# Patient Record
Sex: MALE | Race: BLACK OR AFRICAN AMERICAN | Employment: UNEMPLOYED | URBAN - METROPOLITAN AREA
[De-identification: names, ages, dates, MRNs, and addresses within clinical notes are randomized per-mention and may not be internally consistent; named-entity substitution may affect disease eponyms.]

---

## 2017-01-12 ENCOUNTER — HOSPITAL ENCOUNTER (EMERGENCY)
Age: 57
Discharge: HOME | End: 2017-01-12
Attending: EMERGENCY MEDICINE
Payer: SUBSIDIZED

## 2017-01-12 VITALS
HEIGHT: 73 IN | TEMPERATURE: 97.9 F | SYSTOLIC BLOOD PRESSURE: 130 MMHG | OXYGEN SATURATION: 98 % | DIASTOLIC BLOOD PRESSURE: 68 MMHG | BODY MASS INDEX: 25.02 KG/M2 | WEIGHT: 188.8 LBS | HEART RATE: 76 BPM | RESPIRATION RATE: 18 BRPM

## 2017-01-12 DIAGNOSIS — J02.9 VIRAL PHARYNGITIS: Primary | ICD-10-CM

## 2017-01-12 LAB — DEPRECATED S PYO AG THROAT QL EIA: NEGATIVE

## 2017-01-12 PROCEDURE — 87070 CULTURE OTHR SPECIMN AEROBIC: CPT | Performed by: EMERGENCY MEDICINE

## 2017-01-12 PROCEDURE — 87880 STREP A ASSAY W/OPTIC: CPT | Performed by: PHYSICIAN ASSISTANT

## 2017-01-12 PROCEDURE — 99283 EMERGENCY DEPT VISIT LOW MDM: CPT

## 2017-01-12 PROCEDURE — 74011000250 HC RX REV CODE- 250: Performed by: PHYSICIAN ASSISTANT

## 2017-01-12 RX ORDER — LIDOCAINE HYDROCHLORIDE 20 MG/ML
15 SOLUTION OROPHARYNGEAL
Status: COMPLETED | OUTPATIENT
Start: 2017-01-12 | End: 2017-01-12

## 2017-01-12 RX ORDER — ACETAMINOPHEN AND CODEINE PHOSPHATE 120; 12 MG/5ML; MG/5ML
1 SOLUTION ORAL
Qty: 120 ML | Refills: 0 | Status: SHIPPED | OUTPATIENT
Start: 2017-01-12 | End: 2017-03-21

## 2017-01-12 RX ADMIN — LIDOCAINE HYDROCHLORIDE 15 ML: 20 SOLUTION ORAL; TOPICAL at 12:20

## 2017-01-12 NOTE — ED NOTES
Patient seen for complaints of sore throat that started yesterday. Denies fever, no cough, no other cold symptoms. Denies sick members of household. Redness noted to throat. Complains of difficulty swallowing pills this morning. Emergency Department Nursing Plan of Care       The Nursing Plan of Care is developed from the Nursing assessment and Emergency Department Attending provider initial evaluation. The plan of care may be reviewed in the ED Provider note.     The Plan of Care was developed with the following considerations:   Patient / Family readiness to learn indicated by:verbalized understanding  Persons(s) to be included in education: patient  Barriers to Learning/Limitations:No    Signed     Manus Jevon    1/12/2017  [id number]

## 2017-01-12 NOTE — DISCHARGE INSTRUCTIONS
Sore Throat: Care Instructions  Your Care Instructions    Infection by bacteria or a virus causes most sore throats. Cigarette smoke, dry air, air pollution, allergies, and yelling can also cause a sore throat. Sore throats can be painful and annoying. Fortunately, most sore throats go away on their own. If you have a bacterial infection, your doctor may prescribe antibiotics. Follow-up care is a key part of your treatment and safety. Be sure to make and go to all appointments, and call your doctor if you are having problems. It's also a good idea to know your test results and keep a list of the medicines you take. How can you care for yourself at home? · If your doctor prescribed antibiotics, take them as directed. Do not stop taking them just because you feel better. You need to take the full course of antibiotics. · Gargle with warm salt water once an hour to help reduce swelling and relieve discomfort. Use 1 teaspoon of salt mixed in 1 cup of warm water. · Take an over-the-counter pain medicine, such as acetaminophen (Tylenol), ibuprofen (Advil, Motrin), or naproxen (Aleve). Read and follow all instructions on the label. · Be careful when taking over-the-counter cold or flu medicines and Tylenol at the same time. Many of these medicines have acetaminophen, which is Tylenol. Read the labels to make sure that you are not taking more than the recommended dose. Too much acetaminophen (Tylenol) can be harmful. · Drink plenty of fluids. Fluids may help soothe an irritated throat. Hot fluids, such as tea or soup, may help decrease throat pain. · Use over-the-counter throat lozenges to soothe pain. Regular cough drops or hard candy may also help. These should not be given to young children because of the risk of choking. · Do not smoke or allow others to smoke around you. If you need help quitting, talk to your doctor about stop-smoking programs and medicines.  These can increase your chances of quitting for good. · Use a vaporizer or humidifier to add moisture to your bedroom. Follow the directions for cleaning the machine. When should you call for help? Call your doctor now or seek immediate medical care if:  · You have new or worse trouble swallowing. · Your sore throat gets much worse on one side. Watch closely for changes in your health, and be sure to contact your doctor if you do not get better as expected. Where can you learn more? Go to http://ajit-david.info/. Enter [phone] [id number] in the search box to learn more about \"Sore Throat: Care Instructions. \"  Current as of: July 29, 2016  Content Version: 11.1  © 5305-2004 [employer]. Care instructions adapted under license by [employer] (which disclaims liability or warranty for this information). If you have questions about a medical condition or this instruction, always ask your healthcare professional. [address] any warranty or liability for your use of this information.

## 2017-01-12 NOTE — ED PROVIDER NOTES
Patient is a 60 y.o. male presenting with sore throat. The history is provided by the patient. Sore Throat    This is a new problem. The current episode started yesterday. The problem has not changed since onset. There has been no fever. Pertinent negatives include no congestion, no drooling, no ear discharge, no ear pain, no headaches, no shortness of breath, no stridor, no swollen glands and no cough. He has had no exposure to strep. He has tried nothing for the symptoms. Past Medical History:   Diagnosis Date    Diabetes ([facility] [address]     Diabetes mellitus ([facility] [address] 7/11/2012    Eczema 7/11/2012       Past Surgical History:   Procedure Laterality Date    Hx orthopaedic       L knee scope         Family History:   Problem Relation Age of Onset    Hypertension Mother    [facility] Arthritis-osteo Mother        Social History     Social History    Marital status: SINGLE     Spouse name: N/A    Number of children: N/A    Years of education: N/A     Occupational History    Not on file. Social History Main Topics    Smoking status: Current Every Day Smoker     Packs/day: 1.00    Smokeless tobacco: Current User    Alcohol use 5.0 oz/week     5 Cans of beer, 5 Standard drinks or equivalent per week      Comment: daily    Drug use: Yes     Special: Cocaine, Heroin      Comment: reports using last about one week ago, prior to that had been clean for several years    Sexual activity: Yes     Other Topics Concern    Not on file     Social History Narrative         ALLERGIES: Doxycycline; Tramadol; and Pcn [penicillins]    Review of Systems   Constitutional: Negative for fever. HENT: Positive for sore throat. Negative for congestion, drooling, ear discharge and ear pain. Respiratory: Negative for cough, shortness of breath and stridor. Neurological: Negative for headaches. All other systems reviewed and are negative.       Vitals:    01/12/17 1116   BP: 130/68   Pulse: 76   Resp: 18   Temp: 97.9 °F (36.6 °C) SpO2: 98%   Weight: 85.6 kg (188 lb 12.8 oz)   Height: 6' 1\" (1.854 m)            Physical Exam   Constitutional: He is oriented to person, place, and time. He appears well-developed and well-nourished. No distress. HENT:   Head: Normocephalic and atraumatic. Right Ear: Tympanic membrane normal.   Left Ear: Tympanic membrane normal.   Mouth/Throat: Uvula is midline, oropharynx is clear and moist and mucous membranes are normal. He has dentures. No oropharyngeal exudate, posterior oropharyngeal edema, posterior oropharyngeal erythema or tonsillar abscesses. Eyes: Conjunctivae are normal.   Cardiovascular: Normal rate, regular rhythm and normal heart sounds. Pulmonary/Chest: Effort normal and breath sounds normal. No respiratory distress. He has no wheezes. He has no rales. Musculoskeletal: Normal range of motion. Lymphadenopathy:        Head (right side): Submandibular (and TTP) adenopathy present. Neurological: He is alert and oriented to person, place, and time. Skin: Skin is warm and dry. Psychiatric: He has a normal mood and affect. His behavior is normal. Judgment and thought content normal.   Nursing note and vitals reviewed.        MDM  Number of Diagnoses or Management Options  Diagnosis management comments: DDX: strep v viral pharyngitis, lymphadenitis, lymphadenopathy       Amount and/or Complexity of Data Reviewed  Clinical lab tests: ordered and reviewed      ED Course       Procedures

## 2017-01-14 LAB
BACTERIA SPEC CULT: NORMAL
SERVICE CMNT-IMP: NORMAL

## 2017-03-20 ENCOUNTER — HOSPITAL ENCOUNTER (EMERGENCY)
Age: 57
Discharge: HOME | End: 2017-03-20
Attending: EMERGENCY MEDICINE
Payer: SELF-PAY

## 2017-03-20 VITALS
OXYGEN SATURATION: 98 % | BODY MASS INDEX: 24.12 KG/M2 | HEART RATE: 68 BPM | HEIGHT: 73 IN | WEIGHT: 182 LBS | RESPIRATION RATE: 18 BRPM | DIASTOLIC BLOOD PRESSURE: 74 MMHG | TEMPERATURE: 97.6 F | SYSTOLIC BLOOD PRESSURE: 137 MMHG

## 2017-03-20 DIAGNOSIS — R73.9 HYPERGLYCEMIA: Primary | ICD-10-CM

## 2017-03-20 LAB
GLUCOSE BLD STRIP.AUTO-MCNC: 139 MG/DL (ref 65–100)
SERVICE CMNT-IMP: ABNORMAL

## 2017-03-20 PROCEDURE — 99283 EMERGENCY DEPT VISIT LOW MDM: CPT

## 2017-03-20 PROCEDURE — 82962 GLUCOSE BLOOD TEST: CPT

## 2017-03-20 RX ORDER — SODIUM CHLORIDE 0.9 % (FLUSH) 0.9 %
5-10 SYRINGE (ML) INJECTION EVERY 8 HOURS
Status: DISCONTINUED | OUTPATIENT
Start: 2017-03-20 | End: 2017-03-20

## 2017-03-20 RX ORDER — SODIUM CHLORIDE 0.9 % (FLUSH) 0.9 %
5-10 SYRINGE (ML) INJECTION AS NEEDED
Status: DISCONTINUED | OUTPATIENT
Start: 2017-03-20 | End: 2017-03-20

## 2017-03-20 NOTE — DISCHARGE INSTRUCTIONS
Learning About Diabetes Food Guidelines  Your Care Instructions  Meal planning is important to manage diabetes. It helps keep your blood sugar at a target level (which you set with your doctor). You don't have to eat special foods. You can eat what your family eats, including sweets once in a while. But you do have to pay attention to how often you eat and how much you eat of certain foods. You may want to work with a dietitian or a certified diabetes educator (CDE) to help you plan meals and snacks. A dietitian or CDE can also help you lose weight if that is one of your goals. What should you know about eating carbs? Managing the amount of carbohydrate (carbs) you eat is an important part of healthy meals when you have diabetes. Carbohydrate is found in many foods. · Learn which foods have carbs. And learn the amounts of carbs in different foods. ¨ Bread, cereal, pasta, and rice have about 15 grams of carbs in a serving. A serving is 1 slice of bread (1 ounce), ½ cup of cooked cereal, or 1/3 cup of cooked pasta or rice. ¨ Fruits have 15 grams of carbs in a serving. A serving is 1 small fresh fruit, such as an apple or orange; ½ of a banana; ½ cup of cooked or canned fruit; ½ cup of fruit juice; 1 cup of melon or raspberries; or 2 tablespoons of dried fruit. ¨ Milk and no-sugar-added yogurt have 15 grams of carbs in a serving. A serving is 1 cup of milk or 2/3 cup of no-sugar-added yogurt. ¨ Starchy vegetables have 15 grams of carbs in a serving. A serving is ½ cup of mashed potatoes or sweet potato; 1 cup winter squash; ½ of a small baked potato; ½ cup of cooked beans; or ½ cup cooked corn or green peas. · Learn how much carbs to eat each day and at each meal. A dietitian or CDE can teach you how to keep track of the amount of carbs you eat. This is called carbohydrate counting. · If you are not sure how to count carbohydrate grams, use the Plate Method to plan meals.  It is a good, quick way to make sure that you have a balanced meal. It also helps you spread carbs throughout the day. ¨ Divide your plate by types of foods. Put non-starchy vegetables on half the plate, meat or other protein food on one-quarter of the plate, and a grain or starchy vegetable in the final quarter of the plate. To this you can add a small piece of fruit and 1 cup of milk or yogurt, depending on how many carbs you are supposed to eat at a meal.  · Try to eat about the same amount of carbs at each meal. Do not \"save up\" your daily allowance of carbs to eat at one meal.  · Proteins have very little or no carbs per serving. Examples of proteins are beef, chicken, turkey, fish, eggs, tofu, cheese, cottage cheese, and peanut butter. A serving size of meat is 3 ounces, which is about the size of a deck of cards. Examples of meat substitute serving sizes (equal to 1 ounce of meat) are 1/4 cup of cottage cheese, 1 egg, 1 tablespoon of peanut butter, and ½ cup of tofu. How can you eat out and still eat healthy? · Learn to estimate the serving sizes of foods that have carbohydrate. If you measure food at home, it will be easier to estimate the amount in a serving of restaurant food. · If the meal you order has too much carbohydrate (such as potatoes, corn, or baked beans), ask to have a low-carbohydrate food instead. Ask for a salad or green vegetables. · If you use insulin, check your blood sugar before and after eating out to help you plan how much to eat in the future. · If you eat more carbohydrate at a meal than you had planned, take a walk or do other exercise. This will help lower your blood sugar. What else should you know? · Limit saturated fat, such as the fat from meat and dairy products. This is a healthy choice because people who have diabetes are at higher risk of heart disease. So choose lean cuts of meat and nonfat or low-fat dairy products. Use olive or canola oil instead of butter or shortening when cooking.   · Don't skip meals. Your blood sugar may drop too low if you skip meals and take insulin or certain medicines for diabetes. · Check with your doctor before you drink alcohol. Alcohol can cause your blood sugar to drop too low. Alcohol can also cause a bad reaction if you take certain diabetes medicines. Follow-up care is a key part of your treatment and safety. Be sure to make and go to all appointments, and call your doctor if you are having problems. It's also a good idea to know your test results and keep a list of the medicines you take. Where can you learn more? Go to http://ajit-david.info/. Enter J945 in the search box to learn more about \"Learning About Diabetes Food Guidelines. \"  Current as of: May 23, 2016  Content Version: 11.1  © 5982-8870 [employer]. Care instructions adapted under license by [employer] (which disclaims liability or warranty for this information). If you have questions about a medical condition or this instruction, always ask your healthcare professional. [address] any warranty or liability for your use of this information. Learning About High Blood Sugar  What is high blood sugar? Your body turns the food you eat into glucose (sugar), which it uses for energy. But if your body isn't able to use the sugar right away, it can build up in your blood and lead to high blood sugar. When the amount of sugar in your blood stays too high for too much of the time, you may have diabetes. Diabetes is a disease that can cause serious health problems. The good news is that lifestyle changes may help you get your blood sugar back to normal and avoid or delay diabetes. What causes high blood sugar? Sugar (glucose) can build up in your blood if you:  · Are overweight. · Have a family history of diabetes. · Take certain medicines, such as steroids. What are the symptoms?   Having high blood sugar may not cause any symptoms at all. Or it may make you feel very thirsty or very hungry. You may also urinate more often than usual, have blurry vision, or lose weight without trying. How is high blood sugar treated? You can take steps to lower your blood sugar level if you understand what makes it get higher. Your doctor may want you to learn how to test your blood sugar level at home. Then you can see how illness, stress, or different kinds of food or medicine raise or lower your blood sugar level. Other tests may be needed to see if you have diabetes. How can you prevent high blood sugar? · Watch your weight. If you're overweight, losing just a small amount of weight may help. Reducing fat around your waist is most important. · Limit the amount of calories, sweets, and unhealthy fat you eat. Ask your doctor if a dietitian can help you. A registered dietitian can help you create meal plans that fit your lifestyle. · Get at least 30 minutes of exercise on most days of the week. Exercise helps control your blood sugar. It also helps you maintain a healthy weight. Walking is a good choice. You also may want to do other activities, such as running, swimming, cycling, or playing tennis or team sports. · If your doctor prescribed medicines, take them exactly as prescribed. Call your doctor if you think you are having a problem with your medicine. You will get more details on the specific medicines your doctor prescribes. Follow-up care is a key part of your treatment and safety. Be sure to make and go to all appointments, and call your doctor if you are having problems. It's also a good idea to know your test results and keep a list of the medicines you take. Where can you learn more? Go to http://ajit-david.info/. Enter O108 in the search box to learn more about \"Learning About High Blood Sugar. \"  Current as of: May 23, 2016  Content Version: 11.1  © 2681-0172 [employer].  Care instructions adapted under license by [employer] (which disclaims liability or warranty for this information). If you have questions about a medical condition or this instruction, always ask your healthcare professional. [address] any warranty or liability for your use of this information. Noninsulin Medicines for Type 2 Diabetes: Care Instructions  Your Care Instructions  There are different types of noninsulin medicines for diabetes. Each works in a different way to help you control your blood sugar. Some types help your body make insulin to lower your blood sugar. Others lower how much insulin your body needs. Some can slow how quickly your body digests sugars. And some can remove extra glucose through your urine. Some of these medicines are:  · Alpha-glucosidase inhibitors. These keep starches from breaking down. This means that they lower the amount of glucose absorbed when you eat. They do not help your body make more insulin. So they will not cause low blood sugar unless they are used with other medicines for diabetes. They include acarbose and miglitol. · DPP-4 inhibitors. These help your body increase the level of insulin after eating. They also help your body make less of a hormone that raises blood sugar. They include linagliptin, saxagliptin, and sitagliptin. · Incretin hormones (GLP-1 receptor agonists). Your body makes a protein that can raise your insulin level, lower your blood sugar, and make you less hungry. You can inject hormone medicines that work the same way as this protein. They include exenatide and liraglutide. · Meglitinides. These help your body release insulin. They also help slow how your body digests sugars. In this way, they prevent your blood sugar from rising too fast after you eat. They include nateglinide and repaglinide. · Metformin. This lowers how much glucose your liver makes. And it helps you respond better to insulin.  It also lowers the amount of stored sugar that your liver releases when you are not eating. · Sodium glucose co-transporter 2 inhibitors (SGLT2 inhibitors). These help to remove extra glucose through your urine. They may also help some people lose weight. They include canagliflozin, dapagliflozin, and empagliflozin. · Sulfonylureas. These help your body release more insulin. Some work for many hours and can cause low blood sugar if you don't eat as you expected. They include glipizide and glyburide. · Thiazolidinediones. These reduce the amount of blood glucose. They also help you respond better to insulin. They include pioglitazone and rosiglitazone. You may need to take more than one medicine for diabetes. Two or more medicines may work better to lower your blood sugar level than just one medicine. Follow-up care is a key part of your treatment and safety. Be sure to make and go to all appointments, and call your doctor if you are having problems. It's also a good idea to know your test results and keep a list of the medicines you take. How can you care for yourself at home? · Eat a healthy diet. Get some exercise each day. This may help you to reduce how much medicine you need. · Do not take other prescription or over-the-counter medicines, vitamins, herbal products, or supplements without talking to your doctor first. Some medicines for type 2 diabetes can cause problems with other medicines or supplements. · Tell your doctor if you plan to get pregnant. Some of these drugs are not safe for pregnant women. · Be safe with medicines. Take your medicines exactly as prescribed. Meglitinides or sulfonylureas can cause your blood sugar to drop very low. Call your doctor if you think you are having a problem with your medicine. · Check your blood sugar levels often. You can use a glucose monitor. Keeping track can help you know how certain foods, activities, and medicines affect your blood sugar.  And it can help you keep your blood sugar from getting so low that it's not safe. When should you call for help? Call 911 anytime you think you may need emergency care. For example, call if:  · You passed out (lost consciousness), or you suddenly become very sleepy or confused. (You may have very low blood sugar.)  · You have symptoms of high blood sugar, such as:  ¨ Blurred vision. ¨ Trouble staying awake or being woken up. ¨ Fast, deep breathing. ¨ Breath that smells fruity. ¨ Belly pain, not feeling hungry, and vomiting. ¨ Feeling confused. Call your doctor now or seek immediate medical care if:  · You are sick and cannot control your blood sugar. · You have been vomiting or have had diarrhea for more than 6 hours. · Your blood sugar stays higher than the level your doctor has set for you. · You have symptoms of low blood sugar, such as:  ¨ Sweating. ¨ Feeling nervous, shaky, and weak. ¨ Extreme hunger and slight nausea. ¨ Dizziness and headache. ¨ Blurred vision. ¨ Confusion. Watch closely for changes in your health, and be sure to contact your doctor if:  · You have a hard time knowing when your blood sugar is low. · You have trouble keeping your blood sugar in the target range. · You often have problems controlling your blood sugar. · You have symptoms of long-term diabetes problems, such as:  ¨ New vision changes. ¨ New pain, numbness, or tingling in your hands or feet. ¨ Skin problems. Where can you learn more? Go to http://ajit-david.info/. Enter H153 in the search box to learn more about \"Noninsulin Medicines for Type 2 Diabetes: Care Instructions. \"  Current as of: August 3, 2016  Content Version: 11.1  © 9346-6093 [employer]. Care instructions adapted under license by [employer] (which disclaims liability or warranty for this information).  If you have questions about a medical condition or this instruction, always ask your healthcare professional. [employer] disclaims any warranty or liability for your use of this information.

## 2017-03-20 NOTE — ED PROVIDER NOTES
HPI Comments: Lytle Soulier is a 60 y.o. male with PMhx significant for Eczema and DM who presents ambulatory to the ED with cc of elevated blood sugar 350 that occurred this morning. He states that he took his dose of metformin after checking her blood sugar this morning. Pt reports having fried foods, bread, EtOH, and orange juice yesterday. He reports having a bowl of frosted flakes this morning. Per nurse note, pt blood sugar in the ED was 137. He denies any nausea, vomiting, or weakness. Social history significant for: + Tobacco (1ppd), + EtOH, + Illicit drug use    PCP: Everett Martinez NP    There are no other complaints, changes or physical findings at this time. Written by EDDI Ward, as dictated by Mikaela Matias. Booker Goldsmith MD      The history is provided by the patient. No  was used. Past Medical History:   Diagnosis Date    Diabetes ([facility]     Diabetes mellitus ([facility] 7/11/2012    Eczema 7/11/2012       Past Surgical History:   Procedure Laterality Date    HX ORTHOPAEDIC      L knee scope         Family History:   Problem Relation Age of Onset    Hypertension Mother     Arthritis-osteo Mother        Social History     Social History    Marital status: SINGLE     Spouse name: N/A    Number of children: N/A    Years of education: N/A     Occupational History    Not on file.      Social History Main Topics    Smoking status: Current Every Day Smoker     Packs/day: 1.00    Smokeless tobacco: Current User    Alcohol use 5.0 oz/week     5 Cans of beer, 5 Standard drinks or equivalent per week      Comment: daily    Drug use: Yes     Special: Cocaine, Heroin      Comment: reports using last about one week ago, prior to that had been clean for several years    Sexual activity: Yes     Other Topics Concern    Not on file     Social History Narrative         ALLERGIES: Doxycycline; Tramadol; and Pcn [penicillins]    Review of Systems   Constitutional: Negative. Negative for activity change, appetite change, chills, fatigue, fever and unexpected weight change. HENT: Negative. Negative for congestion, hearing loss, rhinorrhea, sneezing and voice change. Eyes: Negative. Negative for pain and visual disturbance. Respiratory: Negative. Negative for apnea, cough, choking, chest tightness and shortness of breath. Cardiovascular: Negative. Negative for chest pain and palpitations. Gastrointestinal: Negative. Negative for abdominal distention, abdominal pain, blood in stool, diarrhea, nausea and vomiting. Endocrine:        + elevated blood sugar   Genitourinary: Negative. Negative for difficulty urinating, flank pain, frequency and urgency. No discharge   Musculoskeletal: Negative. Negative for arthralgias, back pain, myalgias and neck stiffness. Skin: Negative. Negative for color change and rash. Neurological: Negative. Negative for dizziness, seizures, syncope, speech difficulty, weakness, numbness and headaches. Hematological: Negative for adenopathy. Psychiatric/Behavioral: Negative. Negative for agitation, behavioral problems, dysphoric mood and suicidal ideas. The patient is not nervous/anxious. Patient Vitals for the past 12 hrs:   Temp Pulse Resp BP SpO2   03/20/17 1007 97.6 °F (36.4 °C) 68 18 137/74 98 %     Physical Exam   Nursing note and vitals reviewed.    Physical Examination: General appearance - WDWN, in no apparent distress  Head - NC/AT  Eyes - pupils equal, round  and reactive, extraocular eye movements intact, conj/sclera clear, anicteric  Mouth - mucous membranes moist, pharynx normal without lesions  Nose/Ears - nares clear, Tms & canals clear  Neck - supple, no significant adenopathy, trachea midline, no crepitus, c spine diffusely non-tender, no step offs  Chest - Normal respiratory effort, clear to auscultation bilaterally, no wheezes/rales/rhonchi  Heart - normal rate and regular rhythm, S1 and S2 normal, no murmurs, gallops, or rubs  Abdomen - soft, nontender, nondistended, nabs, no masses, guarding, rebound or rigidity  Neurological - alert, oriented, normal speech, cranial nerves intact, no focal motor findings, motor & sensory diffusely intact, normal gait  Extremities/MS - peripheral pulses normal, no pedal edema, all joints atraumatic, FROM, non-tender, no gross deformities, spine diffusely non-tender  Skin - normal coloration and turgor, no rashes, no lesions or lacerations    MDM  Number of Diagnoses or Management Options  Hyperglycemia:   Diagnosis management comments:   DDx: Hyperglycemia, diabetes        Amount and/or Complexity of Data Reviewed  Review and summarize past medical records: yes    Patient Progress  Patient progress: stable    Procedures    LABORATORY TESTS:  Recent Results (from the past 12 hour(s))   GLUCOSE, POC    Collection Time: 03/20/17 10:05 AM   Result Value Ref Range    Glucose (POC) 139 (H) 65 - 100 mg/dL    Performed by Rey Snyder      IMPRESSION:  1. Hyperglycemia        PLAN:  1. Current Discharge Medication List        2. Follow-up Information     Follow up With Details Comments 10 Julian Bryson, NP Schedule an appointment as soon as possible for a visit As needed [place] [id number]  [phone]      [facility] EMERGENCY DEPT  As needed, If symptoms worsen [address]  [phone]        Return to ED if worse     DISCHARGE NOTE  10:17 AM  The patient has been re-evaluated and is ready for discharge. Reviewed available results with patient. Counseled patient on diagnosis and care plan. Patient has expressed understanding, and all questions have been answered. Patient agrees with plan and agrees to follow up as recommended, or return to the ED if their symptoms worsen. Discharge instructions have been provided and explained to the patient, along with reasons to return to the ED.     This note is prepared by Anitra Cabot, acting as Scribe for [employer]. Nura Atkins, [address] [place] [address]. Nura Atkins MD: The scribe's documentation has been prepared under my direction and personally reviewed by me in its entirety. I confirm that the note above accurately reflects all work, treatment, procedures, and medical decision making performed by me.

## 2017-03-20 NOTE — ED NOTES
Patient (s)  given copy of dc instructions and  paper script(s) and  electronic scripts. Patient (s)  verbalized understanding of instructions and script (s). Patient given a current medication reconciliation form and verbalized understanding of their medications. Patient (s) verbalized understanding of the importance of discussing medications with his or her physician or clinic they will be following up with. Patient alert and oriented and in no acute distress. Patient offered wheelchair from treatment area to hospital entrance, patient decline wheelchair.

## 2017-03-20 NOTE — ED NOTES
Per pt reports before going into work today, BG was 350, took two Metformin prior to arrival. Pt is concerned due to his BG never being this high before, asymptomatic at this time. Pt reports eating a bowl of Frosted Flakes this morning, ate wings and drink beer last night.  Pt denies chest pain, sob, abdominal pain, urinary symptoms and thirst.

## 2017-03-21 ENCOUNTER — OFFICE VISIT (OUTPATIENT)
Dept: ENDOCRINOLOGY | Age: 57
End: 2017-03-21

## 2017-03-21 VITALS
SYSTOLIC BLOOD PRESSURE: 108 MMHG | RESPIRATION RATE: 16 BRPM | DIASTOLIC BLOOD PRESSURE: 57 MMHG | HEART RATE: 77 BPM | BODY MASS INDEX: 24.92 KG/M2 | HEIGHT: 73 IN | WEIGHT: 188 LBS

## 2017-03-21 DIAGNOSIS — E11.9 TYPE 2 DIABETES MELLITUS WITHOUT COMPLICATION, WITHOUT LONG-TERM CURRENT USE OF INSULIN (HCC): Primary | ICD-10-CM

## 2017-03-21 LAB — GLUCOSE POC: 143 MG/DL

## 2017-03-21 RX ORDER — GLIMEPIRIDE 2 MG/1
2 TABLET ORAL
Qty: 90 TAB | Refills: 1 | Status: SHIPPED | OUTPATIENT
Start: 2017-03-21 | End: 2017-07-06 | Stop reason: SDUPTHER

## 2017-03-21 RX ORDER — GLIPIZIDE 5 MG/1
5 TABLET ORAL
Qty: 30 TAB | Refills: 11 | Status: CANCELLED | OUTPATIENT
Start: 2017-03-21

## 2017-03-21 RX ORDER — METFORMIN HYDROCHLORIDE 500 MG/1
1000 TABLET ORAL 2 TIMES DAILY WITH MEALS
Qty: 120 TAB | Refills: 11 | Status: SHIPPED | OUTPATIENT
Start: 2017-03-21 | End: 2017-07-06 | Stop reason: ALTCHOICE

## 2017-03-21 RX ORDER — ACETAMINOPHEN AND CODEINE PHOSPHATE 120; 12 MG/5ML; MG/5ML
SOLUTION ORAL
Refills: 0 | COMMUNITY
Start: 2017-01-12 | End: 2017-03-21 | Stop reason: ALTCHOICE

## 2017-03-21 NOTE — PROGRESS NOTES
Endocrinology Visit    Chief Complaint: Type 2 diabetes    History of Present Illness: Brendan Copeland is a 60 y.o. male who returns for uncontrolled type 2 diabetes mellitus with last A1c 12.6% in November at his initial consultation with me. At that time he was not taking any medications for diabetes and reported symptoms including polyuria, polydipsia, headaches, fatigue, weight loss (15 lbs since 2015), and erectile dysfunction. I started him back on metformin and added glipizide (just 5mg at dinner) as well since his blood sugars were in the 200-300 range consistently. In the interim, he reports blood sugars have been mostly in the 100s but it was very high yesterday morning. He went to the ER at [facility] yesterday for a blood sugar of 350 at home. POC glucose in the ER was 139 so he was discharged home without further work-up. He says that the night before the high glucose he took a Viagra and had a few regular Corona beers with wings, bread, and mashed potatoes. The morning before the high reading he had a large bowl of Frosted Flakes with milk. He did not take his glipizide pill that night. He feels the glipizide causes him to have headaches. Current regimen is metformin 1000mg BID and glipizide 5 mg Qpm before dinner. He is checking his blood sugars with a ReliOn meter 2 times/day. He does not have a record of blood sugars with him today. From memory, values have ranged from  but most values are in the upper 100s. Average is around 150 per his report. He denies hypoglycemia. He reports that he has improved his dietary habits some: eating baked chicken and vegetables at home when possible; cut down on bread. Still occasionally eating fast food and drinking regular sodas and light beer (but not as frequently). Eats 1-2 meals/day and does snack.  Largest meal is his dinner (his girlfriend is a good cook), but other than that he does not have set meals, rarely eats breakfast. He wants suggestions for breakfast today. Sometimes has a salad for lunch. Review of Systems   General ROS: negative  Ophthalmic ROS: positive for - scotomata  ENT ROS: negative  Endocrine ROS: negative  Respiratory ROS: no cough, shortness of breath, or wheezing  Cardiovascular ROS: no chest pain or dyspnea on exertion  Gastrointestinal ROS: no abdominal pain, change in bowel habits, or black or bloody stools  Genito-Urinary ROS: no dysuria, trouble voiding, or hematuria  Musculoskeletal ROS: negative  Neurological ROS: positive for - numbness/tingling; headaches  Dermatological ROS: negative    Problem List:  Patient Active Problem List   Diagnosis Code    Eczema L30.9    Erectile dysfunction N52.9    Diabetes mellitus ([facility] E11.9       Past Medical History:    Past Medical History:   Diagnosis Date    Diabetes ([facility]     [facility] 7/11/2012    Eczema 7/11/2012       Past Surgical History:  Past Surgical History:   Procedure Laterality Date    HX ORTHOPAEDIC      L knee scope       Social History:  Social History     Social History    Marital status: SINGLE     Spouse name: N/A    Number of children: N/A    Years of education: N/A     Occupational History    Not on file.      Social History Main Topics    Smoking status: Current Every Day Smoker     Packs/day: 1.00    Smokeless tobacco: Current User    Alcohol use 5.0 oz/week     5 Cans of beer, 5 Standard drinks or equivalent per week      Comment: daily    Drug use: Yes     Special: Cocaine, Heroin      Comment: reports using last about one week ago, prior to that had been clean for several years    Sexual activity: Yes     Other Topics Concern    Not on file     Social History Narrative       Family History:  Family History   Problem Relation Age of Onset    Hypertension Mother     Arthritis-osteo Mother        Medications:     Current Outpatient Prescriptions:     acetaminophen-codeine (TYLENOL-CODEINE) 120-12 mg/5 mL solution, Take 5 mL by mouth every six (6) hours as needed for Pain. Max Daily Amount: 20 mL., Disp: 120 mL, Rfl: 0    glipiZIDE (GLUCOTROL) 5 mg tablet, Take 1 Tab by mouth Daily (before dinner). , Disp: 30 Tab, Rfl: 11    metFORMIN (GLUCOPHAGE) 500 mg tablet, Take 2 Tabs by mouth two (2) times daily (with meals). Follow titration instructions provided in clinic, Disp: 120 Tab, Rfl: 11    Blood-Glucose Meter (RELION ALL-IN-ONE METER) monitoring kit, Check BG 1-2 times/day, Disp: 1 Kit, Rfl: 0    glucose blood VI test strips (BLOOD GLUCOSE TEST) strip, Check BG 1-2 times/day, Disp: 100 Strip, Rfl: 11    Allergies: Allergies   Allergen Reactions    Doxycycline Itching    Tramadol Itching    Pcn [Penicillins] Hives       Physical Examination:  Visit Vitals    /57    Pulse 77    Resp 16    Ht 6' 1\" (1.854 m)    Wt 188 lb (85.3 kg)    BMI 24.8 kg/m2     Gen: no acute distress  HEENT: mucous membranes moist, poor dentition  CAD: normal rate, regular rhythm. No murmur rubs or gallops  PULM: clear to ausculation, no wheezes, rhonchis or rales.   EXT: no clubbing, cyanosis or edema  Neuro: grossly non focal  Psych: pleasant, cooperative, good insight into medical hx  Skin: warm, dry    Clinical Data Review:  Lab Results   Component Value Date/Time    Hemoglobin A1c 12.6 11/30/2016 12:09 PM    Hemoglobin A1c (POC) 6.3 11/14/2012 08:52 AM     Lab Results   Component Value Date/Time    Sodium 138 11/30/2016 12:09 PM    Potassium 4.2 11/30/2016 12:09 PM    Chloride 99 11/30/2016 12:09 PM    CO2 26 11/30/2016 12:09 PM    Glucose 310 11/30/2016 12:09 PM    BUN 12 11/30/2016 12:09 PM    Creatinine 0.73 11/30/2016 12:09 PM    BUN/Creatinine ratio 16 11/30/2016 12:09 PM    GFR est  11/30/2016 12:09 PM    GFR est non- 11/30/2016 12:09 PM    Calcium 9.0 06/01/2012 10:03 AM     Lab Results   Component Value Date/Time    Microalb/Creat ratio (ug/mg creat.) 15.0 11/30/2016 12:09 PM    Microalbumin, urine 13.5 11/30/2016 12:09 PM    Microalbumin urine (POC) 30 11/14/2012 09:01 AM     Lab Results   Component Value Date/Time    Cholesterol (POC) 132 11/14/2012 08:53 AM    HDL Cholesterol (POC) 51 11/14/2012 08:53 AM    LDL Cholesterol (POC) 65 11/14/2012 08:53 AM    Triglycerides (POC) 80 11/14/2012 08:53 AM       Assessment and Plan:  Patient is a 60y.o. year old male here for f/u of uncontrolled type 2 diabetes, control of which has improved after starting dual agent therapy and reducing dietary carbohydrate intake. Will make slight adjustments as below to give him more even coverage throughout the day. Glycemic Medication Changes:  - switch glipizide to glimepiride 2mg daily  - continue metformin 1000mg BID  - bring BG readings to next visit, will determine if uptitration of glimepiride dose is warranted    DM Health Maintenance: pertinent items updated in HM tab  Cv/Lipids: not on statin, update fasting lipids at next visit (not fasting today)  BP/Renal: BP at goal, not on ACEi, microalbumin UTD and nonelevated  Podiatry: advised he see podiatry due to calluses, encouraged well-fitting footwear and daily inspection  Neuro: no evidence of neuropathy on exam - foot exam updated Nov 2016  Ophtho: yearly eye exam recommended, reminded pt to get an appointment  Diet and exercise: discussed healthy eating and exercise recommendations, particularly reduction in dietary carbohydrates    We have discussed the importance of the need for good blood pressure and glycemic control to further reduce the risks of microvascular and macrovascular complications. We have discussed how to recognize and treat hypoglycemia. He will notify me in between appointments for hypoglycemia or persistent hyperglycemia and has my contact information. I spent 25 minutes with the patient today and > 50% of the time was spent counseling the patient about diabetes management including medication options and dietary modification.  Patient verbalized an understanding and will return to clinic in 3 months. Thank you for the opportunity to participate in this patient's care.     Jesus Lucero MD  [facility]  [address] [place] [address]

## 2017-03-21 NOTE — MR AVS SNAPSHOT
Visit Information Date & Time Provider Department Dept. Phone Encounter #  
 3/21/2017  2:30 PM Jazzmine Dorado, [facility] [phone] [id number] Follow-up Instructions Return in about 3 months (around 6/21/2017). Upcoming Health Maintenance Date Due Hepatitis C Screening 1960 EYE EXAM RETINAL OR DILATED Q1 8/11/1968 Pneumococcal 19-64 Medium Risk (1 of 1 - PPSV23) 8/11/1977 DTaP/Tdap/Td series (1 - Tdap) 8/11/1979 FOBT Q 1 YEAR AGE 50-75 8/11/2008 LIPID PANEL Q1 11/14/2013 INFLUENZA AGE 9 TO ADULT 8/1/2016 HEMOGLOBIN A1C Q6M 5/30/2017 FOOT EXAM Q1 11/30/2017 MICROALBUMIN Q1 11/30/2017 Allergies as of 3/21/2017  Review Complete On: 3/21/2017 By: Ra Stage Severity Noted Reaction Type Reactions Doxycycline  11/15/2016    Itching Tramadol  11/15/2016    Itching Pcn [Penicillins] Low 03/28/2014    Hives Current Immunizations  Never Reviewed No immunizations on file. Not reviewed this visit You Were Diagnosed With   
  
 Codes Comments Type 2 diabetes mellitus without complication, without long-term current use of insulin (HCC)    -  Primary ICD-10-CM: E11.9 ICD-9-CM: 250.00 Vitals BP Pulse Resp Height(growth percentile) Weight(growth percentile) BMI  
 108/57 77 16 6' 1\" (1.854 m) 188 lb (85.3 kg) 24.8 kg/m2 Smoking Status Current Every Day Smoker Vitals History BMI and BSA Data Body Mass Index Body Surface Area  
 24.8 kg/m 2 2.1 m 2 Preferred Pharmacy Pharmacy Name Phone [facility] [address], [address] [place] [address] Avenue [phone] Your Updated Medication List  
  
   
This list is accurate as of: 3/21/17  2:52 PM.  Always use your most recent med list.  
  
  
  
  
 Blood-Glucose Meter monitoring kit Commonly known as:  RELION ALL-IN-ONE METER Check BG 1-2 times/day glimepiride 2 mg tablet Commonly known as:  AMARYL Take 1 Tab by mouth every morning. glucose blood VI test strips strip Commonly known as:  blood glucose test  
Check BG 1-2 times/day  
  
 metFORMIN 500 mg tablet Commonly known as:  GLUCOPHAGE Take 2 Tabs by mouth two (2) times daily (with meals). Follow titration instructions provided in clinic VIAGRA PO Take  by mouth. Prescriptions Sent to Pharmacy Refills  
 glimepiride (AMARYL) 2 mg tablet 1 Sig: Take 1 Tab by mouth every morning. Class: Normal  
 Pharmacy: [id number] [place] [address], [address] [place] [address] Ph #: [phone] Route: Oral  
 metFORMIN (GLUCOPHAGE) 500 mg tablet 11 Sig: Take 2 Tabs by mouth two (2) times daily (with meals). Follow titration instructions provided in clinic Class: Normal  
 Pharmacy: [id number] [place] [address] Ph #: [phone] Route: Oral  
  
We Performed the Following AMB POC GLUCOSE, QUANTITATIVE, BLOOD [37597 CPT(R)] BASIC METABOLIC PANEL (7) [05650 CPT(R)] HEMOGLOBIN A1C WITH EAG [18978 CPT(R)] Follow-up Instructions Return in about 3 months (around 6/21/2017). Patient Instructions Diabetes Instructions: 
- start glimepiride (Amaryl) 2mg once a day (before breakfast) - stop taking glipizide 
- continue taking metformin 1000mg twice a day Check blood sugar at least TWICE a day: every morning when you wake up and at bedtime. Keep a record of your values and bring this or your glucometer with you to your next visit. Goal blood sugars:  Call me if you have a blood sugar less than 80 or greater than 250. Diet instructions: 
Reduce the amount of carbohydrates in your diet. This means not just avoiding sweets, sodas, or desserts but also reducing the amount of bread, pasta, rice, potatoes, corn, and crackers that you eat.  Do not have more than one serving of carbs per meal (for example: do not eat a sandwich and potato chips in the same meal). Focus on eating mostly protein (meat, poultry, fish, shellfish, eggs), healthy fats (avocados, nuts, cheese, olive or coconut oil) and non-starchy vegetables (greens, carrots, tomatoes, bell peppers, broccoli, brussels sprouts, green beans, etc). If you fill yourself up with these foods, you won't even want the carbs. Avoid Regular Sodas or Beer. Only Diet Sodas or Light Beer. Introducing [facility]! [place] [employer] introduces [employer] patient portal. Now you can access parts of your medical record, email your doctor's office, and request medication refills online. 1. In your internet browser, go to [email or web address] 2. Click on the First Time User? Click Here link in the Sign In box. You will see the New Member Sign Up page. 3. Enter your [employer] Access Code exactly as it appears below. You will not need to use this code after youve completed the sign-up process. If you do not sign up before the expiration date, you must request a new code. · [employer] Access Code: [email or web address] Expires: 3/21/2017  4:14 PM 
 
4. Enter the last four digits of your Social Security Number (xxxx) and Date of Birth (mm/dd/yyyy) as indicated and click Submit. You will be taken to the next sign-up page. 5. Create a bizsolt ID. This will be your [employer] login ID and cannot be changed, so think of one that is secure and easy to remember. 6. Create a [employer] password. You can change your password at any time. 7. Enter your Password Reset Question and Answer. This can be used at a later time if you forget your password. 8. Enter your e-mail address. You will receive e-mail notification when new information is available in [address] 9. Click Sign Up. You can now view and download portions of your medical record.  
10. Click the Download Summary menu link to download a portable copy of your medical information. If you have questions, please visit the Frequently Asked Questions section of the [employer] website. Remember, [employer] is NOT to be used for urgent needs. For medical emergencies, dial 911. Now available from your iPhone and Android! Please provide this summary of care documentation to your next provider. Your primary care clinician is listed as NOT ON FILE. If you have any questions after today's visit, please call [phone].

## 2017-03-21 NOTE — PATIENT INSTRUCTIONS
Diabetes Instructions:  - start glimepiride (Amaryl) 2mg once a day (before breakfast)  - stop taking glipizide  - continue taking metformin 1000mg twice a day    Check blood sugar at least TWICE a day: every morning when you wake up and at bedtime. Keep a record of your values and bring this or your glucometer with you to your next visit. Goal blood sugars:   Call me if you have a blood sugar less than 80 or greater than 250. Diet instructions:  Reduce the amount of carbohydrates in your diet. This means not just avoiding sweets, sodas, or desserts but also reducing the amount of bread, pasta, rice, potatoes, corn, and crackers that you eat. Do not have more than one serving of carbs per meal (for example: do not eat a sandwich and potato chips in the same meal). Focus on eating mostly protein (meat, poultry, fish, shellfish, eggs), healthy fats (avocados, nuts, cheese, olive or coconut oil) and non-starchy vegetables (greens, carrots, tomatoes, bell peppers, broccoli, brussels sprouts, green beans, etc). If you fill yourself up with these foods, you won't even want the carbs. Avoid Regular Sodas or Beer. Only Diet Sodas or Light Beer.

## 2017-03-22 LAB
BUN SERPL-MCNC: 18 MG/DL (ref 6–24)
BUN/CREAT SERPL: 23 (ref 9–20)
CHLORIDE SERPL-SCNC: 101 MMOL/L (ref 96–106)
CO2 SERPL-SCNC: 24 MMOL/L (ref 18–29)
CREAT SERPL-MCNC: 0.78 MG/DL (ref 0.76–1.27)
EST. AVERAGE GLUCOSE BLD GHB EST-MCNC: 171 MG/DL
GLUCOSE SERPL-MCNC: 121 MG/DL (ref 65–99)
HBA1C MFR BLD: 7.6 % (ref 4.8–5.6)
POTASSIUM SERPL-SCNC: 4.1 MMOL/L (ref 3.5–5.2)
SODIUM SERPL-SCNC: 140 MMOL/L (ref 134–144)

## 2017-05-05 ENCOUNTER — HOSPITAL ENCOUNTER (EMERGENCY)
Age: 57
Discharge: HOME | End: 2017-05-05
Attending: EMERGENCY MEDICINE
Payer: SELF-PAY

## 2017-05-05 VITALS
TEMPERATURE: 97.8 F | WEIGHT: 186 LBS | BODY MASS INDEX: 24.65 KG/M2 | OXYGEN SATURATION: 99 % | SYSTOLIC BLOOD PRESSURE: 134 MMHG | HEART RATE: 79 BPM | DIASTOLIC BLOOD PRESSURE: 61 MMHG | HEIGHT: 73 IN | RESPIRATION RATE: 20 BRPM

## 2017-05-05 DIAGNOSIS — J30.1 SEASONAL ALLERGIC RHINITIS DUE TO POLLEN: Primary | ICD-10-CM

## 2017-05-05 DIAGNOSIS — J02.9 PHARYNGITIS, UNSPECIFIED ETIOLOGY: ICD-10-CM

## 2017-05-05 LAB — DEPRECATED S PYO AG THROAT QL EIA: NEGATIVE

## 2017-05-05 PROCEDURE — 87880 STREP A ASSAY W/OPTIC: CPT | Performed by: PHYSICIAN ASSISTANT

## 2017-05-05 PROCEDURE — 99282 EMERGENCY DEPT VISIT SF MDM: CPT

## 2017-05-05 PROCEDURE — 87070 CULTURE OTHR SPECIMN AEROBIC: CPT | Performed by: EMERGENCY MEDICINE

## 2017-05-05 RX ORDER — FLUTICASONE PROPIONATE 50 MCG
2 SPRAY, SUSPENSION (ML) NASAL DAILY
Qty: 1 BOTTLE | Refills: 0 | Status: SHIPPED | OUTPATIENT
Start: 2017-05-05 | End: 2018-08-28

## 2017-05-05 RX ORDER — FLUTICASONE PROPIONATE 50 MCG
2 SPRAY, SUSPENSION (ML) NASAL DAILY
Qty: 1 BOTTLE | Refills: 0 | Status: SHIPPED | OUTPATIENT
Start: 2017-05-05 | End: 2017-05-05

## 2017-05-06 NOTE — DISCHARGE INSTRUCTIONS

## 2017-05-06 NOTE — ED NOTES
Emergency Department Nursing Plan of Care       The Nursing Plan of Care is developed from the Nursing assessment and Emergency Department Attending provider initial evaluation. The plan of care may be reviewed in the ED Provider note.     The Plan of Care was developed with the following considerations:   Patient / Family readiness to learn indicated by:verbalized understanding  Persons(s) to be included in education: patient  Barriers to Learning/Limitations:No    [address], RN    5/5/2017   9:19 PM

## 2017-05-06 NOTE — ED TRIAGE NOTES
Pt arrived to ED with c/o sore throat since last night. Pt denies any other symptoms. Denies n/v/d, fevers. Pt is alert and orientated X 4; skin is intact; lungs are clear; pt breaths well on room air; Pt is in no acute distress. Will continue to monitor.

## 2017-05-06 NOTE — ED NOTES
Patient  given copy of dc instructions and 2 e- script(s). Patient  verbalized understanding of instructions and script (s). Patient given a current medication reconciliation form and verbalized understanding of their medications. Patient  verbalized understanding of the importance of discussing medications with  his or her physician or clinic they will be following up with. Patient alert and oriented and in no acute distress. Patient discharged home ambulatory.

## 2017-05-06 NOTE — ED PROVIDER NOTES
Patient is a 60 y.o. male presenting with sore throat. The history is provided by the patient. Sore Throat    This is a new problem. The current episode started yesterday. The problem has not changed since onset. There has been no fever. Pertinent negatives include no diarrhea, no vomiting, no congestion, no drooling, no ear discharge, no ear pain, no headaches, no plugged ear sensation, no shortness of breath, no stridor, no swollen glands, no trouble swallowing, no stiff neck and no cough. Treatments tried: salt water and cough drop. The treatment provided no relief. Past Medical History:   Diagnosis Date    Diabetes ([facility] [address]     Diabetes mellitus ([facility] [address] 7/11/2012    Eczema 7/11/2012       Past Surgical History:   Procedure Laterality Date    HX ORTHOPAEDIC      L knee scope         Family History:   Problem Relation Age of Onset    Hypertension Mother     Arthritis-osteo Mother        Social History     Social History    Marital status: SINGLE     Spouse name: N/A    Number of children: N/A    Years of education: N/A     Occupational History    Not on file. Social History Main Topics    Smoking status: Current Every Day Smoker     Packs/day: 1.00    Smokeless tobacco: Current User    Alcohol use 5.0 oz/week     5 Cans of beer, 5 Standard drinks or equivalent per week      Comment: daily    Drug use: Yes     Special: Cocaine, Heroin      Comment: reports using last about one week ago, prior to that had been clean for several years    Sexual activity: Yes     Other Topics Concern    Not on file     Social History Narrative         ALLERGIES: Doxycycline; Tramadol; and Pcn [penicillins]    Review of Systems   Constitutional: Negative for activity change, chills, fatigue and fever. HENT: Positive for rhinorrhea, sneezing and sore throat.  Negative for congestion, dental problem, drooling, ear discharge, ear pain, facial swelling, hearing loss, nosebleeds, postnasal drip, sinus pressure, trouble swallowing and voice change. Eyes: Positive for itching. Negative for pain. Respiratory: Negative. Negative for apnea, cough, chest tightness, shortness of breath and stridor. Cardiovascular: Negative. Negative for chest pain. Gastrointestinal: Negative. Negative for abdominal pain, constipation, diarrhea, nausea and vomiting. Genitourinary: Negative. Negative for dysuria. Musculoskeletal: Negative. Skin: Negative. Negative for rash. Allergic/Immunologic: Positive for environmental allergies. Neurological: Negative for headaches. Psychiatric/Behavioral: Negative. Vitals:    05/05/17 1906   BP: 134/61   Pulse: 79   Resp: 20   Temp: 97.8 °F (36.6 °C)   SpO2: 99%   Weight: 84.4 kg (186 lb)   Height: 6' 1\" (1.854 m)            Physical Exam   Constitutional: He is oriented to person, place, and time. He appears well-developed and well-nourished. No distress. HENT:   Head: Normocephalic and atraumatic. Right Ear: Hearing, tympanic membrane, external ear and ear canal normal.   Left Ear: Hearing, tympanic membrane, external ear and ear canal normal.   Nose: Mucosal edema and rhinorrhea present. Mouth/Throat: Uvula is midline, oropharynx is clear and moist and mucous membranes are normal. No oropharyngeal exudate, posterior oropharyngeal edema, posterior oropharyngeal erythema or tonsillar abscesses. Eyes: Conjunctivae and EOM are normal. Pupils are equal, round, and reactive to light. Neck: Normal range of motion. Neck supple. Cardiovascular: Normal rate, regular rhythm, normal heart sounds and intact distal pulses. Pulmonary/Chest: Effort normal and breath sounds normal. No accessory muscle usage. No respiratory distress. Abdominal: Soft. There is no tenderness. Musculoskeletal: Normal range of motion. Neurological: He is alert and oriented to person, place, and time. No cranial nerve deficit. Skin: Skin is warm, dry and intact. No rash noted.  He is not diaphoretic. No pallor. Psychiatric: He has a normal mood and affect. His speech is normal and behavior is normal. Judgment and thought content normal.   Nursing note and vitals reviewed. MDM  Number of Diagnoses or Management Options  Pharyngitis, unspecified etiology:   Seasonal allergic rhinitis due to pollen:   Diagnosis management comments: DDx: strep pharyngitis/ tonsilitis, viral pharyngitis, allergic rhinitis    LABORATORY TESTS:  Recent Results (from the past 12 hour(s))  -STREP AG SCREEN, GROUP A  Collection Time: 05/05/17  8:59 PM       Result                                            Value                         Ref Range                       Group A Strep Ag ID                               NEGATIVE                      NEG                          IMAGING RESULTS:  No orders to display    MEDICATIONS GIVEN:  Medications - No data to display    IMPRESSION:  Seasonal allergic rhinitis due to pollen  (primary encounter diagnosis)  Pharyngitis, unspecified etiology    PLAN:  1. Current Discharge Medication List    START taking these medications    loratadine-pseudoephedrine (CLARITIN-D 12 HOUR) 5-120 mg per tablet  Take 1 Tab by mouth two (2) times a day. Qty: 30 Tab Refills: 0    fluticasone (FLONASE) 50 mcg/actuation nasal spray  2 Sprays by Both Nostrils route daily. Qty: 1 Bottle Refills: 0      CONTINUE these medications which have NOT CHANGED    glimepiride (AMARYL) 2 mg tablet  Take 1 Tab by mouth every morning. Qty: 90 Tab Refills: 1  Comments: To replace glipizide    metFORMIN (GLUCOPHAGE) 500 mg tablet  Take 2 Tabs by mouth two (2) times daily (with meals).  Follow titration instructions provided in clinic  Qty: 120 Tab Refills: 11    Blood-Glucose Meter (RELION ALL-IN-ONE METER) monitoring kit  Check BG 1-2 times/day  Qty: 1 Kit Refills: 0    glucose blood VI test strips (BLOOD GLUCOSE TEST) strip  Check BG 1-2 times/day  Qty: 100 Strip Refills: 11    SILDENAFIL CITRATE (VIAGRA PO)  Take  by mouth. 2. Follow-up Information     Follow up With Details Comments [address] [facility] Cohn Schedule an appointment as soon   as possible for a visit in 1 week As needed, If symptoms worsen [address] [facility] [address]    [place] Schedule an appointment as soon as possible   for a visit in 1 week As needed, If symptoms worsen [address]/ [place]  [id number] [phone]    Provider Unknown Schedule an appointment as soon as possible for a visit   in 1 week As needed, If symptoms worsen Patient not available to ask        Return to ED if worse                  Amount and/or Complexity of Data Reviewed  Clinical lab tests: ordered and reviewed    Patient Progress  Patient progress: stable    ED Course       Procedures    9:42 PM  I have discussed with patient their diagnosis, treatment, and follow up plan. The patient agrees to follow up as outlined in discharge paperwork and also to return to the ED with any worsening.  Alejo Monae PA-C

## 2017-05-08 LAB
BACTERIA SPEC CULT: NORMAL
SERVICE CMNT-IMP: NORMAL

## 2017-05-12 ENCOUNTER — HOSPITAL ENCOUNTER (EMERGENCY)
Age: 57
Discharge: HOME | End: 2017-05-12
Attending: EMERGENCY MEDICINE | Admitting: EMERGENCY MEDICINE
Payer: SELF-PAY

## 2017-05-12 VITALS
RESPIRATION RATE: 16 BRPM | BODY MASS INDEX: 24.65 KG/M2 | HEIGHT: 73 IN | DIASTOLIC BLOOD PRESSURE: 79 MMHG | SYSTOLIC BLOOD PRESSURE: 152 MMHG | OXYGEN SATURATION: 99 % | WEIGHT: 186 LBS | HEART RATE: 85 BPM | TEMPERATURE: 98.5 F

## 2017-05-12 DIAGNOSIS — S00.422A: Primary | ICD-10-CM

## 2017-05-12 PROCEDURE — 99282 EMERGENCY DEPT VISIT SF MDM: CPT

## 2017-05-12 NOTE — DISCHARGE INSTRUCTIONS
Learning About Your Ears  What do your ears do? Your ears make it possible for you to hear. They are also important in helping you keep your balance. The ear is made up of the external ear canal, middle ear, and inner ear. The middle ear is  from the ear canal by the eardrum. The middle ear contains the malleus, incus, and stapes bones, which are also known as the hammer, anvil, and stirrup. The inner ear contains the cochlea, which is the main sensory organ of hearing. The eustachian tube runs from the middle ear to the back part of the nose. How the ear works  · Sound waves enter the ear through the ear canal and strike the eardrum. · The eardrum vibrates, and the vibrations move to the bones of the middle ear. · In response, the bones of the middle ear vibrate, magnifying the sound and sending it to the inner ear. · Sound vibrations cause the fluid in the inner ear to move, which bends tiny hair cells (cilia) in the cochlea. · The movement of the hair cells creates nerve impulses, which travel along the cochlear nerve to the brain and are interpreted as sound. What problems can happen to your ears? Problems with your ears may include:  · Infection of the middle ear (otitis media). · Swelling or infection of the ear canal (swimmer's ear). · Backup of fluid behind the eardrum (otitis media with effusion). · Hearing loss. · Labyrinthitis and BPPV (benign paroxysmal positional vertigo), which are problems in the inner ear. They may cause vertigo. Vertigo makes you feel like you're spinning or whirling. · Tinnitus, which occurs when you have ringing sounds (or roaring, hissing, buzzing, or tinkling) in your ears all the time. · A ruptured eardrum, which is a tear or hole in the membrane of the middle ear. How can you prevent ear problems? · Blow your nose gently.   · Keep soap and shampoo out of the ear canal. These products can cause itching, which can be mistaken for an ear infection because of the need to scratch or pull at the ears. · Do not put cotton swabs, abhishek pins, or other objects (especially if they are sharp) in the ear canal.  · Limit or avoid exposure to loud music, power tools, gunshots, and heavy machinery. Wear protective earplugs or earmuffs if you cannot avoid loud noises. ¨ Avoid wearing earplugs too often or for too long. They can irritate your ears. ¨ Do not use wadded-up tissue or cotton balls. They don't work well. · Do not smoke or allow others to smoke around you. Smoking may make ear problems more likely. If you need help quitting, talk to your doctor about stop-smoking programs and medicines. These can increase your chances of quitting for good. · If you wear hearing aids, be sure to follow the recommendations carefully for cleaning and storing them. Where can you learn more? Go to http://ajit-david.info/. Enter T191 in the search box to learn more about \"Learning About Your Ears. \"  Current as of: July 29, 2016  Content Version: 11.2  © 6507-1128 [employer]. Care instructions adapted under license by [employer] (which disclaims liability or warranty for this information). If you have questions about a medical condition or this instruction, always ask your healthcare professional. [place] [address] any warranty or liability for your use of this information.

## 2017-05-12 NOTE — ED PROVIDER NOTES
Patient is a 60 y.o. male presenting with ear problem. The history is provided by the patient. Ear Injury    This is a new problem. Episode onset: last night pt states he stuck a Q-tip in his ear to clean it and had a sharp pain but it went away. Today about 2hrs PTA he felt like ear was draining and tissue had blood in it so he stuck tissue a couple more times in it to try and get it out. The problem occurs constantly. The problem has not changed since onset. Patient complains that the left ear is affected. There has been no fever. The pain is at a severity of 0/10. The patient is experiencing no pain. Associated symptoms include ear discharge. Pertinent negatives include no sore throat and no cough. Past Medical History:   Diagnosis Date    Diabetes ([facility] [address]     Diabetes mellitus ([facility] [address] 7/11/2012    Eczema 7/11/2012       Past Surgical History:   Procedure Laterality Date    HX ORTHOPAEDIC      L knee scope         Family History:   Problem Relation Age of Onset    Hypertension Mother     Arthritis-osteo Mother        Social History     Social History    Marital status: SINGLE     Spouse name: N/A    Number of children: N/A    Years of education: N/A     Occupational History    Not on file. Social History Main Topics    Smoking status: Current Every Day Smoker     Packs/day: 1.00    Smokeless tobacco: Current User    Alcohol use 5.0 oz/week     5 Cans of beer, 5 Standard drinks or equivalent per week      Comment: daily    Drug use: Yes     Special: Cocaine, Heroin      Comment: reports using last about one week ago, prior to that had been clean for several years    Sexual activity: Yes     Other Topics Concern    Not on file     Social History Narrative         ALLERGIES: Doxycycline; Tramadol; and Pcn [penicillins]    Review of Systems   Constitutional: Negative for fever. HENT: Positive for ear discharge. Negative for sneezing and sore throat.     Respiratory: Negative for cough and shortness of breath. Musculoskeletal: Negative. Neurological: Negative. All other systems reviewed and are negative. Vitals:    05/12/17 1544   BP: 152/79   Pulse: 85   Resp: 16   Temp: 98.5 °F (36.9 °C)   SpO2: 99%   Weight: 84.4 kg (186 lb)   Height: 6' 1\" (1.854 m)            Physical Exam   Constitutional: He is oriented to person, place, and time. He appears well-developed and well-nourished. No distress. HENT:   Head: Normocephalic and atraumatic. Right Ear: Tympanic membrane normal.   Left Ear: There is drainage (blood noted in canal and surrounding small vesicular like lesion). Tympanic membrane is not injected, not scarred, not perforated, not erythematous and not bulging. No middle ear effusion. No decreased hearing is noted. Eyes: Conjunctivae are normal.   Cardiovascular: Normal rate, regular rhythm and normal heart sounds. Pulmonary/Chest: Effort normal and breath sounds normal. No respiratory distress. He has no wheezes. He has no rales. Musculoskeletal: Normal range of motion. Neurological: He is alert and oriented to person, place, and time. Skin: Skin is warm and dry. Psychiatric: He has a normal mood and affect. His behavior is normal. Judgment and thought content normal.   Nursing note and vitals reviewed.        MDM  Number of Diagnoses or Management Options  Diagnosis management comments: DDX: otalgia, perforated TM, ear canal abrasion, ear canal laceration, FB    ED Course       Procedures

## 2017-05-13 ENCOUNTER — HOSPITAL ENCOUNTER (EMERGENCY)
Age: 57
Discharge: HOME | End: 2017-05-13
Attending: INTERNAL MEDICINE
Payer: SELF-PAY

## 2017-05-13 VITALS
SYSTOLIC BLOOD PRESSURE: 129 MMHG | BODY MASS INDEX: 24.65 KG/M2 | DIASTOLIC BLOOD PRESSURE: 72 MMHG | WEIGHT: 186 LBS | HEIGHT: 73 IN | HEART RATE: 74 BPM | TEMPERATURE: 97.9 F | RESPIRATION RATE: 18 BRPM | OXYGEN SATURATION: 98 %

## 2017-05-13 DIAGNOSIS — H60.322 ACUTE HEMORRHAGIC OTITIS EXTERNA OF LEFT EAR: Primary | ICD-10-CM

## 2017-05-13 PROCEDURE — 99282 EMERGENCY DEPT VISIT SF MDM: CPT

## 2017-05-13 RX ORDER — NEOMYCIN SULFATE, POLYMYXIN B SULFATE AND HYDROCORTISONE 10; 3.5; 1 MG/ML; MG/ML; [USP'U]/ML
3 SUSPENSION/ DROPS AURICULAR (OTIC) 3 TIMES DAILY
Qty: 3 ML | Refills: 0 | Status: SHIPPED | OUTPATIENT
Start: 2017-05-13 | End: 2017-05-18

## 2017-05-13 RX ORDER — CIPROFLOXACIN 500 MG/1
500 TABLET ORAL 2 TIMES DAILY
Qty: 14 TAB | Refills: 0 | Status: SHIPPED | OUTPATIENT
Start: 2017-05-13 | End: 2017-05-20

## 2017-05-13 NOTE — ED NOTES
Pt presents to ED ambulatory with complaint(s) of fullness feeling in left ear. Pt reports he was seen here yesterday and the provider told him to let his ear drain. Pt states \"it just stopped draining and it feels stopped up and now I can barely hear out of it. \" Pt is alert and oriented x4 and in no apparent distress. Emergency Department Nursing Plan of Care       The Nursing Plan of Care is developed from the Nursing assessment and Emergency Department Attending provider initial evaluation. The plan of care may be reviewed in the ED Provider note.     The Plan of Care was developed with the following considerations:   Patient / Family readiness to learn indicated by:verbalized understanding  Persons(s) to be included in education: patient  Barriers to Learning/Limitations:No    Signed     Rachel Coffman RN    5/13/2017   5:20 AM

## 2017-05-13 NOTE — ED NOTES
Patient given copy of dc instructions and 2 paper script(s) and 0 electronic scripts. Patient verbalized understanding of instructions and script(s). Patient given a current medication reconciliation form and verbalized understanding of their medications. Patient verbalized understanding of the importance of discussing medications with his or her physician or clinic they will be following up with. Patient alert and oriented and in no acute distress. Patient verbalizes pain of 0 out of 10. Patient offered wheelchair from treatment area to hospital entrance, patient declined wheelchair. Patient discharged home with self.

## 2017-05-13 NOTE — ED PROVIDER NOTES
HPI Comments: Sophy Anguiano is a 60 y.o. male with PMhx significant for DM and eczema who presents ambulatory to the ED with cc of gradually worsening \"echo-like\" tinnitus in his left ear since yesterday (5/12/17). He also c/o associated activity change. The pt states that he \"can feel the fluid\" in his left ear. He reports that he has been unable to sleep due to his symptoms. He states that he was seen in the ED on 5/12/17, noting that he was told to wait for his ear to drain. The pt reports that he was not prescribed any medications upon discharge that same day. He specifically denies any ear pain or ear discharge at this time. SHx: +tobacco (1 PPD), +EtOH, +illicit drug use (cocaine, heroin)      There are no other complaints, changes or physical findings at this time. The history is provided by the patient. No  was used. Past Medical History:   Diagnosis Date    Diabetes ([facility] [address]     Diabetes mellitus ([facility] [address] 7/11/2012    Eczema 7/11/2012       Past Surgical History:   Procedure Laterality Date    HX ORTHOPAEDIC      L knee scope         Family History:   Problem Relation Age of Onset    Hypertension Mother     Arthritis-osteo Mother        Social History     Social History    Marital status: SINGLE     Spouse name: N/A    Number of children: N/A    Years of education: N/A     Occupational History    Not on file.      Social History Main Topics    Smoking status: Current Every Day Smoker     Packs/day: 1.00    Smokeless tobacco: Current User    Alcohol use 5.0 oz/week     5 Cans of beer, 5 Standard drinks or equivalent per week      Comment: daily    Drug use: Yes     Special: Cocaine, Heroin      Comment: reports using last about one week ago, prior to that had been clean for several years    Sexual activity: Yes     Other Topics Concern    Not on file     Social History Narrative         ALLERGIES: Doxycycline; Tramadol; and Pcn [penicillins]    Review of Systems   Constitutional: Positive for activity change. HENT: Positive for tinnitus (left). Negative for ear discharge and ear pain. Respiratory: Negative for shortness of breath. Cardiovascular: Negative for chest pain. Gastrointestinal: Negative for abdominal pain. Genitourinary: Negative for frequency. Musculoskeletal: Negative for back pain. Skin: Negative for rash. Patient Vitals for the past 12 hrs:   Temp Pulse Resp BP SpO2   05/13/17 0514 97.9 °F (36.6 °C) 74 18 129/72 98 %            Physical Exam   Constitutional: He is oriented to person, place, and time. He appears well-developed and well-nourished. HENT:   Swelling to left ear canal.   Cardiovascular: Normal rate, regular rhythm, normal heart sounds and intact distal pulses. Pulmonary/Chest: Effort normal and breath sounds normal.   Abdominal: Soft. There is no tenderness. Neurological: He is alert and oriented to person, place, and time. Skin: Skin is warm and dry. Nursing note and vitals reviewed. MDM  Number of Diagnoses or Management Options  Acute hemorrhagic otitis externa of left ear:   Diagnosis management comments:   DDx: otitis externa, otitis media, inner ear dysfunction. Amount and/or Complexity of Data Reviewed  Review and summarize past medical records: yes    Patient Progress  Patient progress: stable    ED Course       Procedures      IMPRESSION:  1. Acute hemorrhagic otitis externa of left ear        PLAN:  1. Current Discharge Medication List      START taking these medications    Details   neomycin-polymyxin-hydrocortisone, buffered, (PEDIOTIC) 3.5-10,000-1 mg/mL-unit/mL-% otic suspension Administer 3 Drops in left ear three (3) times daily for 5 days. Qty: 3 mL, Refills: 0      ciprofloxacin HCl (CIPRO) 500 mg tablet Take 1 Tab by mouth two (2) times a day for 7 days. Qty: 14 Tab, Refills: 0           2.    Follow-up Information     Follow up With Details Comments Contact Info Provider Unknown   Patient not available to ask      Sanjeev Rosario MD Schedule an appointment as soon as possible for a visit in 1 day follow up with ENT(Dr. Redd Mcarthur) [address]  [phone]          Return to ED if worse       DISCHARGE NOTE:  5:29 AM  The patient has been re-evaluated and is ready for discharge. Reviewed available results with patient. Counseled patient on diagnosis and care plan. Patient has expressed understanding, and all questions have been answered. Patient agrees with plan and agrees to follow up as recommended, or return to the ED if their symptoms worsen. Discharge instructions have been provided and explained to the patient, along with reasons to return to the ED. This note is prepared by Jd Berry, acting as Scribe for Filiberto Kovacs MD.    Filiberto Kovacs MD: The scribe's documentation has been prepared under my direction and personally reviewed by me in its entirety. I confirm that the note above accurately reflects all work, treatment, procedures, and medical decision making performed by me.

## 2017-07-06 ENCOUNTER — OFFICE VISIT (OUTPATIENT)
Dept: ENDOCRINOLOGY | Age: 57
End: 2017-07-06

## 2017-07-06 VITALS
HEIGHT: 73 IN | HEART RATE: 70 BPM | WEIGHT: 191.2 LBS | RESPIRATION RATE: 16 BRPM | BODY MASS INDEX: 25.34 KG/M2 | OXYGEN SATURATION: 98 % | DIASTOLIC BLOOD PRESSURE: 69 MMHG | SYSTOLIC BLOOD PRESSURE: 137 MMHG

## 2017-07-06 DIAGNOSIS — E11.9 TYPE 2 DIABETES MELLITUS WITHOUT COMPLICATION, WITHOUT LONG-TERM CURRENT USE OF INSULIN (HCC): Primary | ICD-10-CM

## 2017-07-06 RX ORDER — GLIMEPIRIDE 2 MG/1
2 TABLET ORAL
Qty: 90 TAB | Refills: 3 | Status: SHIPPED | OUTPATIENT
Start: 2017-07-06 | End: 2019-03-11 | Stop reason: SDUPTHER

## 2017-07-06 RX ORDER — METFORMIN HYDROCHLORIDE 500 MG/1
1000 TABLET ORAL 2 TIMES DAILY WITH MEALS
Qty: 120 TAB | Refills: 11 | Status: CANCELLED | OUTPATIENT
Start: 2017-07-06

## 2017-07-06 RX ORDER — METFORMIN HYDROCHLORIDE 1000 MG/1
1000 TABLET ORAL 2 TIMES DAILY WITH MEALS
Qty: 180 TAB | Refills: 3 | Status: SHIPPED | OUTPATIENT
Start: 2017-07-06 | End: 2019-03-11 | Stop reason: SDUPTHER

## 2017-07-06 NOTE — MR AVS SNAPSHOT
Visit Information Date & Time Provider Department Dept. Phone Encounter #  
 7/6/2017 10:50 AM Narda Pop, [address] [facility] [phone] Follow-up Instructions Return in about 6 months (around 1/6/2018). Upcoming Health Maintenance Date Due Hepatitis C Screening 1960 EYE EXAM RETINAL OR DILATED Q1 8/11/1968 Pneumococcal 19-64 Medium Risk (1 of 1 - PPSV23) 8/11/1977 DTaP/Tdap/Td series (1 - Tdap) 8/11/1979 FOBT Q 1 YEAR AGE 50-75 8/11/2008 LIPID PANEL Q1 11/14/2013 INFLUENZA AGE 9 TO ADULT 8/1/2017 HEMOGLOBIN A1C Q6M 9/21/2017 FOOT EXAM Q1 11/30/2017 MICROALBUMIN Q1 11/30/2017 Allergies as of 7/6/2017  Review Complete On: 7/6/2017 By: Valeria Perla Severity Noted Reaction Type Reactions Doxycycline  11/15/2016    Itching Tramadol  11/15/2016    Itching Pcn [Penicillins] Low 03/28/2014    Hives Current Immunizations  Never Reviewed No immunizations on file. Not reviewed this visit You Were Diagnosed With   
  
 Codes Comments Type 2 diabetes mellitus without complication, without long-term current use of insulin (HCC)    -  Primary ICD-10-CM: E11.9 ICD-9-CM: 250.00 Vitals BP Pulse Resp Height(growth percentile) Weight(growth percentile) SpO2  
 137/69 70 16 6' 1\" (1.854 m) 191 lb 3.2 oz (86.7 kg) 98% BMI Smoking Status 25.23 kg/m2 Current Every Day Smoker Vitals History BMI and BSA Data Body Mass Index Body Surface Area  
 25.23 kg/m 2 2.11 m 2 Preferred Pharmacy Pharmacy Name Phone [facility] [address], [address] [place] [address] Avenue [phone] Your Updated Medication List  
  
   
This list is accurate as of: 7/6/17 11:12 AM.  Always use your most recent med list.  
  
  
  
  
 Blood-Glucose Meter monitoring kit Commonly known as:  RELION ALL-IN-ONE METER Check BG 1-2 times/day fluticasone 50 mcg/actuation nasal spray Commonly known as:  Marine Knack 2 Sprays by Both Nostrils route daily. glimepiride 2 mg tablet Commonly known as:  AMARYL Take 1 Tab by mouth every morning. glucose blood VI test strips strip Commonly known as:  blood glucose test  
Check BG 1-2 times/day  
  
 loratadine-pseudoephedrine 5-120 mg per tablet Commonly known as:  CLARITIN-D 12 HOUR Take 1 Tab by mouth two (2) times a day. metFORMIN 1,000 mg tablet Commonly known as:  GLUCOPHAGE Take 1 Tab by mouth two (2) times daily (with meals). VIAGRA PO Take  by mouth. Prescriptions Sent to Pharmacy Refills  
 glimepiride (AMARYL) 2 mg tablet 3 Sig: Take 1 Tab by mouth every morning. Class: Normal  
 Pharmacy: [address] [place] [address] Ph #: [phone] Route: Oral  
 metFORMIN (GLUCOPHAGE) 1,000 mg tablet 3 Sig: Take 1 Tab by mouth two (2) times daily (with meals). Class: Normal  
 Pharmacy: [address] [place] [address] Ph #: [phone] Route: Oral  
  
We Performed the Following HEMOGLOBIN A1C WITH EAG [42797 CPT(R)] LIPID PANEL [50041 CPT(R)] Follow-up Instructions Return in about 6 months (around 1/6/2018). Patient Instructions Diabetes Instructions: 
- continue taking metformin 1000mg (1 pill) twice a day 
- continue taking glimepiride 2mg daily Check blood sugar at least TWICE a day: every morning when you wake up and at bedtime. Keep a record of your values and bring this or your glucometer with you to your next visit. Diet instructions: 
Reduce the amount of carbohydrates in your diet. This means not just avoiding sweets, sodas, or desserts but also reducing the amount of bread, pasta, rice, potatoes, corn, and crackers that you eat.  Do not have more than one serving of carbs per meal (for example: do not eat a sandwich and potato chips in the same meal). Focus on eating mostly protein (meat, poultry, fish, shellfish, eggs), healthy fats (avocados, nuts, cheese, olive or coconut oil) and non-starchy vegetables (greens, carrots, tomatoes, bell peppers, broccoli, brussels sprouts, green beans, etc). If you fill yourself up with these foods, you won't even want the carbs. Introducing [facility]! [employer] introduces [employer] patient portal. Now you can access parts of your medical record, email your doctor's office, and request medication refills online. 1. In your internet browser, go to [email or web address] 2. Click on the First Time User? Click Here link in the Sign In box. You will see the New Member Sign Up page. 3. Enter your [employer] Access Code exactly as it appears below. You will not need to use this code after youve completed the sign-up process. If you do not sign up before the expiration date, you must request a new code. · [employer] Access Code: [email or web address] Expires: 7/8/2017  6:52 PM 
 
4. Enter the last four digits of your Social Security Number (xxxx) and Date of Birth (mm/dd/yyyy) as indicated and click Submit. You will be taken to the next sign-up page. 5. Create a [employer] ID. This will be your [employer] login ID and cannot be changed, so think of one that is secure and easy to remember. 6. Create a [employer] password. You can change your password at any time. 7. Enter your Password Reset Question and Answer. This can be used at a later time if you forget your password. 8. Enter your e-mail address. You will receive e-mail notification when new information is available in [address] 9. Click Sign Up. You can now view and download portions of your medical record. 10. Click the Download Summary menu link to download a portable copy of your medical information.  
 
If you have questions, please visit the Frequently Asked Questions section of the [employer]. Remember, [facility] is NOT to be used for urgent needs. For medical emergencies, dial 911. Now available from your iPhone and Android! Please provide this summary of care documentation to your next provider. Your primary care clinician is listed as PROVIDER UNKNOWN. If you have any questions after today's visit, please call [phone].

## 2017-07-06 NOTE — PROGRESS NOTES
Endocrinology Visit    Chief Complaint: Type 2 diabetes    History of Present Illness: Vanessa Quintero is a 60 y.o. male who returns for type 2 diabetes mellitus. He was previously uncontrolled with A1c 12.6% in November at his initial consultation with me. At that time he was not taking any medications for diabetes and reported symptoms including polyuria, polydipsia, headaches, fatigue, weight loss (15 lbs since 2015), and erectile dysfunction. I started him back on metformin and added a sulfonylurea as well. A1c in March returned much improved at 7.6%. In the interim, he reports blood sugars have been mostly in the 100s but he has had a few values in the 200s. He can typically identify a dietary trigger when this happens. He is running out of his current supply of medication and for the past week has been taking a little less to make it last.   Current regimen is metformin 1000mg BID and glimepiride 2mg Qam. He is checking his blood sugars with a ReliOn meter 2 times/day. He does not have a record of blood sugars with him today. From memory, values have ranged from  but most values are in the 100s. Average is around 150 per his report. He denies hypoglycemia. He has no known complications from diabetes. He does not currently have a primary care doctor but wants to find one so he can get a prostate exam. He has heard that every man should have this done. He reports that he has improved his dietary habits some: eating baked chicken and vegetables at home when possible; cut down on bread. Still occasionally eating fast food and drinking regular sodas and light beer (but not as frequently). Eats 1-2 meals/day and does snack. Largest meal is his dinner (he says his girlfriend is a good cook), but other than that he does not have set meals. He had an egg sandwich for breakfast today.     Review of Systems as above, otherwise a 7 pt review is negative    Problem List:  Patient Active Problem List Diagnosis Code    Eczema L30.9    Erectile dysfunction N52.9    Diabetes mellitus ([facility] E11.9       Past Medical History:    Past Medical History:   Diagnosis Date    Diabetes ([facility]     [facility] 7/11/2012    Eczema 7/11/2012       Past Surgical History:  Past Surgical History:   Procedure Laterality Date    HX ORTHOPAEDIC      L knee scope       Social History:  Social History     Social History    Marital status: SINGLE     Spouse name: N/A    Number of children: N/A    Years of education: N/A     Occupational History    Not on file. Social History Main Topics    Smoking status: Current Every Day Smoker     Packs/day: 1.00    Smokeless tobacco: Current User    Alcohol use 5.0 oz/week     5 Cans of beer, 5 Standard drinks or equivalent per week      Comment: daily    Drug use: Yes     Special: Cocaine, Heroin      Comment: reports using last about one week ago, prior to that had been clean for several years    Sexual activity: Yes     Other Topics Concern    Not on file     Social History Narrative       Family History:  Family History   Problem Relation Age of Onset    Hypertension Mother     Arthritis-osteo Mother        Medications:     Current Outpatient Prescriptions:     loratadine-pseudoephedrine (CLARITIN-D 12 HOUR) 5-120 mg per tablet, Take 1 Tab by mouth two (2) times a day., Disp: 30 Tab, Rfl: 0    SILDENAFIL CITRATE (VIAGRA PO), Take  by mouth., Disp: , Rfl:     glimepiride (AMARYL) 2 mg tablet, Take 1 Tab by mouth every morning., Disp: 90 Tab, Rfl: 1    metFORMIN (GLUCOPHAGE) 500 mg tablet, Take 2 Tabs by mouth two (2) times daily (with meals).  Follow titration instructions provided in clinic, Disp: 120 Tab, Rfl: 11    Blood-Glucose Meter (RELION ALL-IN-ONE METER) monitoring kit, Check BG 1-2 times/day, Disp: 1 Kit, Rfl: 0    glucose blood VI test strips (BLOOD GLUCOSE TEST) strip, Check BG 1-2 times/day, Disp: 100 Strip, Rfl: 11    fluticasone (FLONASE) 50 mcg/actuation nasal spray, 2 Sprays by Both Nostrils route daily. , Disp: 1 Bottle, Rfl: 0    Allergies: Allergies   Allergen Reactions    Doxycycline Itching    Tramadol Itching    Pcn [Penicillins] Hives       Physical Examination:  Visit Vitals    /69    Pulse 70    Resp 16    Ht 6' 1\" (1.854 m)    Wt 191 lb 3.2 oz (86.7 kg)    SpO2 98%    BMI 25.23 kg/m2     Gen: no acute distress  HEENT: mucous membranes moist, poor dentition  Thyroid: no enlargement or nodules  CAD: normal rate, regular rhythm. No murmur rubs or gallops  PULM: clear to ausculation, no wheezes, rhonchis or rales.   EXT: no clubbing, cyanosis or edema  Neuro: grossly non focal  Psych: pleasant, cooperative, good insight into medical hx  Skin: warm, dry    Clinical Data Review:  Lab Results   Component Value Date/Time    Hemoglobin A1c 7.6 03/21/2017 03:00 PM    Hemoglobin A1c (POC) 6.3 11/14/2012 08:52 AM     Lab Results   Component Value Date/Time    Sodium 140 03/21/2017 03:00 PM    Potassium 4.1 03/21/2017 03:00 PM    Chloride 101 03/21/2017 03:00 PM    CO2 24 03/21/2017 03:00 PM    Glucose 121 03/21/2017 03:00 PM    BUN 18 03/21/2017 03:00 PM    Creatinine 0.78 03/21/2017 03:00 PM    BUN/Creatinine ratio 23 03/21/2017 03:00 PM    GFR est  03/21/2017 03:00 PM    GFR est non-AA 99 03/21/2017 03:00 PM    Calcium 9.0 06/01/2012 10:03 AM     Lab Results   Component Value Date/Time    Microalb/Creat ratio (ug/mg creat.) 15.0 11/30/2016 12:09 PM    Microalbumin urine (POC) 30 11/14/2012 09:01 AM     Lab Results   Component Value Date/Time    Cholesterol (POC) 132 11/14/2012 08:53 AM    HDL Cholesterol (POC) 51 11/14/2012 08:53 AM    LDL Cholesterol (POC) 65 11/14/2012 08:53 AM    Triglycerides (POC) 80 11/14/2012 08:53 AM       Assessment and Plan:  Patient is a 60y.o. year old male here for f/u of uncontrolled type 2 diabetes, control of which has improved after starting dual agent therapy and reducing dietary carbohydrate intake. A1c has improved from 12.6 down to 7.6%. Encouraged continued medication adherence and dietary modification today. I also encouraged him to establish primary care - eventually he can f/u with his PCP for DM since he is relatively controlled on oral medications alone. Glycemic Medication Changes:  - continue glimepiride 2mg daily  - continue metformin 1000mg BID    DM Health Maintenance: pertinent items updated in HM tab  A1c: update today  Cv/Lipids: not on statin, update nonfasting lipids today  BP/Renal: BP at goal, not on ACEi, microalbumin UTD and nonelevated  Podiatry: advised he see podiatry due to calluses, encouraged well-fitting footwear and daily inspection  Neuro: no evidence of neuropathy on exam - foot exam updated Nov 2016  Ophtho: yearly eye exam recommended, reminded pt to get an appointment  Diet and exercise: discussed healthy eating and exercise recommendations, particularly reduction in dietary carbohydrates    I spent 15 minutes with the patient today and > 50% of the time was spent counseling the patient about diabetes management including medication options and dietary modification. Patient verbalized an understanding and will return to clinic in 6 months. Thank you for the opportunity to participate in this patient's care.     Geovani Acosta MD  [facility]  [facility]

## 2017-07-06 NOTE — PROGRESS NOTES
Lab Results   Component Value Date/Time    Hemoglobin A1c 7.6 03/21/2017 03:00 PM    Hemoglobin A1c 12.6 11/30/2016 12:09 PM

## 2017-07-06 NOTE — PATIENT INSTRUCTIONS
Diabetes Instructions:  - continue taking metformin 1000mg (1 pill) twice a day  - continue taking glimepiride 2mg daily    Check blood sugar at least TWICE a day: every morning when you wake up and at bedtime. Keep a record of your values and bring this or your glucometer with you to your next visit. Diet instructions:  Reduce the amount of carbohydrates in your diet. This means not just avoiding sweets, sodas, or desserts but also reducing the amount of bread, pasta, rice, potatoes, corn, and crackers that you eat. Do not have more than one serving of carbs per meal (for example: do not eat a sandwich and potato chips in the same meal). Focus on eating mostly protein (meat, poultry, fish, shellfish, eggs), healthy fats (avocados, nuts, cheese, olive or coconut oil) and non-starchy vegetables (greens, carrots, tomatoes, bell peppers, broccoli, brussels sprouts, green beans, etc). If you fill yourself up with these foods, you won't even want the carbs.

## 2017-07-07 LAB
CHOLEST SERPL-MCNC: 177 MG/DL (ref 100–199)
EST. AVERAGE GLUCOSE BLD GHB EST-MCNC: 166 MG/DL
HBA1C MFR BLD: 7.4 % (ref 4.8–5.6)
HDLC SERPL-MCNC: 53 MG/DL
LDLC SERPL CALC-MCNC: 90 MG/DL (ref 0–99)
TRIGL SERPL-MCNC: 170 MG/DL (ref 0–149)
VLDLC SERPL CALC-MCNC: 34 MG/DL (ref 5–40)

## 2018-01-04 ENCOUNTER — HOSPITAL ENCOUNTER (EMERGENCY)
Age: 58
Discharge: HOME | End: 2018-01-04
Attending: EMERGENCY MEDICINE
Payer: SELF-PAY

## 2018-01-04 VITALS
TEMPERATURE: 97.7 F | RESPIRATION RATE: 19 BRPM | HEART RATE: 93 BPM | OXYGEN SATURATION: 99 % | DIASTOLIC BLOOD PRESSURE: 73 MMHG | SYSTOLIC BLOOD PRESSURE: 136 MMHG | BODY MASS INDEX: 26.82 KG/M2 | HEIGHT: 72 IN | WEIGHT: 198 LBS

## 2018-01-04 DIAGNOSIS — H00.024 HORDEOLUM INTERNUM LEFT UPPER EYELID: Primary | ICD-10-CM

## 2018-01-04 LAB
GLUCOSE BLD STRIP.AUTO-MCNC: 179 MG/DL (ref 65–100)
SERVICE CMNT-IMP: ABNORMAL

## 2018-01-04 PROCEDURE — 82962 GLUCOSE BLOOD TEST: CPT

## 2018-01-04 PROCEDURE — 99282 EMERGENCY DEPT VISIT SF MDM: CPT

## 2018-01-04 RX ORDER — ERYTHROMYCIN 5 MG/G
OINTMENT OPHTHALMIC
Qty: 1 G | Refills: 0 | Status: SHIPPED | OUTPATIENT
Start: 2018-01-04 | End: 2018-08-28

## 2018-01-04 NOTE — ED TRIAGE NOTES
Pt presents with c/o left eye irritation and swelling x4 days. Also, states his blood sugar has been >200 for four days.

## 2018-01-04 NOTE — DISCHARGE INSTRUCTIONS
Styes and Chalazia: Care Instructions  Your Care Instructions    Styes and chalazia (say \"mkn-MXM-rar-uh\") are both conditions that can cause swelling of the eyelid. A stye is an infection in the root of an eyelash. The infection causes a tender red lump on the edge of the eyelid. The infection can spread until the whole eyelid becomes red and inflamed. Styes usually break open, and a tiny amount of pus drains. They usually clear up on their own in about a week, but they sometimes need treatment with antibiotics. A chalazion is a lump or cyst in the eyelid (chalazion is singular; chalazia is plural). It is caused by swelling and inflammation of deep oil glands inside the eyelid. Chalazia are usually not infected. They can take a few months to heal.  If a chalazion becomes more swollen and painful or does not go away, you may need to have it drained by your doctor. Follow-up care is a key part of your treatment and safety. Be sure to make and go to all appointments, and call your doctor if you are having problems. It's also a good idea to know your test results and keep a list of the medicines you take. How can you care for yourself at home? · Do not rub your eyes. Do not squeeze or try to open a stye or chalazion. · To help a stye or chalazion heal faster:  ¨ Put a warm, moist compress on your eye for 5 to 10 minutes, 3 to 6 times a day. Heat often brings a stye to a point where it drains on its own. Keep in mind that warm compresses will often increase swelling a little at first.  ¨ Do not use hot water or heat a wet cloth in a microwave oven. The compress may get too hot and can burn the eyelid. · Always wash your hands before and after you use a compress or touch your eyes. · If the doctor gave you antibiotic drops or ointment, use the medicine exactly as directed. Use the medicine for as long as instructed, even if your eye starts to feel better.   · To put in eyedrops or ointment:  ¨ Tilt your head back, and pull your lower eyelid down with one finger. ¨ Drop or squirt the medicine inside the lower lid. ¨ Close your eye for 30 to 60 seconds to let the drops or ointment move around. ¨ Do not touch the ointment or dropper tip to your eyelashes or any other surface. · Do not wear eye makeup or contact lenses until the stye or chalazion heals. · Do not share towels, pillows, or washcloths while you have a stye. When should you call for help? Call your doctor now or seek immediate medical care if:  ? · You have pain in your eye.   ? · You have a change in vision or loss of vision. ? · Redness and swelling get much worse. ? Watch closely for changes in your health, and be sure to contact your doctor if:  ? · Your stye does not get better in 1 week. ? · Your chalazion does not start to get better after several weeks. Where can you learn more? Go to http://ajit-david.info/. Enter C795 in the search box to learn more about \"Styes and Chalazia: Care Instructions. \"  Current as of: March 3, 2017  Content Version: 11.4  © 3441-4909 [employer]. Care instructions adapted under license by [employer] (which disclaims liability or warranty for this information). If you have questions about a medical condition or this instruction, always ask your healthcare professional. [place] [address] any warranty or liability for your use of this information.

## 2018-01-04 NOTE — ED PROVIDER NOTES
EMERGENCY DEPARTMENT HISTORY AND PHYSICAL EXAM    Date: 1/4/2018  Patient Name: Dakota Rae    History of Presenting Illness     Chief Complaint   Patient presents with    Eye Pain         History Provided By: Patient    Chief Complaint: Lt eye pain/ swelling  Duration: 1 Weeks  Timing:  Gradual  Location: Lt upper eye  Quality: Aching  Severity: 7 out of 10  Modifying Factors: none; warm compress  Associated Symptoms: denies any other associated signs or symptoms      HPI: Dakota Rae is a 59 y.o. male with a PMH of diabetes who presents with LT upper eyelid pain and swelling x 1 week. NKI or FB. No visual impairment or blurred vision. Denies drainage or redness. No contacts or glasses. PCP: PROVIDER UNKNOWN    Current Outpatient Prescriptions   Medication Sig Dispense Refill    erythromycin (ILOTYCIN) ophthalmic ointment 1/2 inch to conjunctival sac (lower lid) four times daily for 7 days. 1 g 0    glimepiride (AMARYL) 2 mg tablet Take 1 Tab by mouth every morning. 90 Tab 3    metFORMIN (GLUCOPHAGE) 1,000 mg tablet Take 1 Tab by mouth two (2) times daily (with meals). 180 Tab 3    glucose blood VI test strips (BLOOD GLUCOSE TEST) strip Check BG 1-2 times/day 100 Strip 11    loratadine-pseudoephedrine (CLARITIN-D 12 HOUR) 5-120 mg per tablet Take 1 Tab by mouth two (2) times a day. 30 Tab 0    fluticasone (FLONASE) 50 mcg/actuation nasal spray 2 Sprays by Both Nostrils route daily. 1 Bottle 0    SILDENAFIL CITRATE (VIAGRA PO) Take  by mouth.       Blood-Glucose Meter (RELION ALL-IN-ONE METER) monitoring kit Check BG 1-2 times/day 1 Kit 0       Past History     Past Medical History:  Past Medical History:   Diagnosis Date    Diabetes ([address]     Diabetes mellitus ([address] 7/11/2012    Eczema 7/11/2012       Past Surgical History:  Past Surgical History:   Procedure Laterality Date    HX ORTHOPAEDIC      L knee scope       Family History:  Family History   Problem Relation Age of Onset    Hypertension Mother     Arthritis-osteo Mother        Social History:  Social History   Substance Use Topics    Smoking status: Current Every Day Smoker     Packs/day: 1.00    Smokeless tobacco: Current User    Alcohol use 5.0 oz/week     5 Cans of beer, 5 Standard drinks or equivalent per week      Comment: daily       Allergies: Allergies   Allergen Reactions    Doxycycline Itching    Tramadol Itching    Pcn [Penicillins] Hives         Review of Systems   Review of Systems   Constitutional: Negative for activity change, chills, fatigue and fever. HENT: Negative for congestion, drooling, ear discharge, ear pain, facial swelling, postnasal drip, rhinorrhea, sinus pressure, sneezing, sore throat and trouble swallowing. Eyes: Positive for pain. Negative for photophobia, discharge, redness, itching and visual disturbance. Respiratory: Negative. Negative for cough and shortness of breath. Cardiovascular: Negative. Gastrointestinal: Negative. Negative for abdominal pain, diarrhea, nausea and vomiting. Genitourinary: Negative. Musculoskeletal: Negative. Skin: Negative for wound. Neurological: Negative for dizziness, weakness and numbness. Psychiatric/Behavioral: Negative. Physical Exam     Vitals:    01/04/18 1750   BP: 136/73   Pulse: 93   Resp: 19   Temp: 97.7 °F (36.5 °C)   SpO2: 99%   Weight: 89.8 kg (198 lb)   Height: 6' (1.829 m)     Physical Exam   Constitutional: He is oriented to person, place, and time. He appears well-developed and well-nourished. No distress. HENT:   Head: Normocephalic and atraumatic. Right Ear: Hearing, tympanic membrane, external ear and ear canal normal.   Left Ear: Hearing, tympanic membrane, external ear and ear canal normal.   Nose: Nose normal. No mucosal edema or rhinorrhea. Right sinus exhibits no maxillary sinus tenderness and no frontal sinus tenderness. Left sinus exhibits no maxillary sinus tenderness and no frontal sinus tenderness. Mouth/Throat: Uvula is midline, oropharynx is clear and moist and mucous membranes are normal. No oropharyngeal exudate, posterior oropharyngeal edema, posterior oropharyngeal erythema or tonsillar abscesses. Eyes: Conjunctivae and EOM are normal. Pupils are equal, round, and reactive to light. Lids are everted and swept, no foreign bodies found. Right eye exhibits no chemosis, no discharge, no exudate and no hordeolum. No foreign body present in the right eye. Left eye exhibits hordeolum. Left eye exhibits no chemosis, no discharge and no exudate. No foreign body present in the left eye. Right conjunctiva is not injected. Right conjunctiva has no hemorrhage. Left conjunctiva is not injected. Left conjunctiva has no hemorrhage. No scleral icterus. Right eye exhibits normal extraocular motion and no nystagmus. Left eye exhibits normal extraocular motion and no nystagmus. Right pupil is round and reactive. Left pupil is round and reactive. Pupils are equal.       Neck: Normal range of motion. Neck supple. Pulmonary/Chest: Effort normal. No accessory muscle usage. No respiratory distress. Neurological: He is alert and oriented to person, place, and time. No cranial nerve deficit. Skin: Skin is warm, dry and intact. He is not diaphoretic. No pallor. Psychiatric: He has a normal mood and affect. His speech is normal and behavior is normal. Judgment and thought content normal.   Nursing note and vitals reviewed. Diagnostic Study Results     Labs -     Recent Results (from the past 12 hour(s))   GLUCOSE, POC    Collection Time: 01/04/18  5:52 PM   Result Value Ref Range    Glucose (POC) 179 (H) 65 - 100 mg/dL    Performed by Shayna FERGUSON        Radiologic Studies -   No orders to display     CT Results  (Last 48 hours)    None        CXR Results  (Last 48 hours)    None            Medical Decision Making   I am the first provider for this patient.     I reviewed the vital signs, available nursing notes, past medical history, past surgical history, family history and social history. Vital Signs-Reviewed the patient's vital signs. Records Reviewed: Nursing Notes    ED Course:     Disposition:  6:00 PM  Pt's . Educated on importance of following up with Diabetes Specialist. Continue medications as prescribed. DISCHARGE NOTE:   6:02 PM      Care plan outlined and precautions discussed. Patient has no new complaints, changes, or physical findings. All medications were reviewed with the patient; will d/c home with erythromycin and warm compress education. All of pt's questions and concerns were addressed. Patient was instructed and agrees to follow up with PCP and/or ophthalmology, as well as to return to the ED upon further deterioration. Patient is ready to go home. Follow-up Information     Follow up With Details Comments Jerome Kamara MD Schedule an appointment as soon as possible for a visit in 1 week As needed, If symptoms worsen [address] [place] [address]  [phone]            Current Discharge Medication List      START taking these medications    Details   erythromycin (ILOTYCIN) ophthalmic ointment 1/2 inch to conjunctival sac (lower lid) four times daily for 7 days. Qty: 1 g, Refills: 0         CONTINUE these medications which have NOT CHANGED    Details   glimepiride (AMARYL) 2 mg tablet Take 1 Tab by mouth every morning. Qty: 90 Tab, Refills: 3    Comments: To replace glipizide      metFORMIN (GLUCOPHAGE) 1,000 mg tablet Take 1 Tab by mouth two (2) times daily (with meals). Qty: 180 Tab, Refills: 3      glucose blood VI test strips (BLOOD GLUCOSE TEST) strip Check BG 1-2 times/day  Qty: 100 Strip, Refills: 11      loratadine-pseudoephedrine (CLARITIN-D 12 HOUR) 5-120 mg per tablet Take 1 Tab by mouth two (2) times a day.   Qty: 30 Tab, Refills: 0      fluticasone (FLONASE) 50 mcg/actuation nasal spray 2 Sprays by Both Nostrils route daily.  Qty: 1 Bottle, Refills: 0      SILDENAFIL CITRATE (VIAGRA PO) Take  by mouth. Blood-Glucose Meter (RELION ALL-IN-ONE METER) monitoring kit Check BG 1-2 times/day  Qty: 1 Kit, Refills: 0             Provider Notes (Medical Decision Making):   DDx: stye, chalazia, blepharitis, conjunctivitis, corneal abrasion unlikely, FB    Procedures:  Procedures        Diagnosis     Clinical Impression:   1.  Hordeolum internum left upper eyelid

## 2018-04-19 ENCOUNTER — HOSPITAL ENCOUNTER (EMERGENCY)
Age: 58
Discharge: HOME | End: 2018-04-19
Attending: EMERGENCY MEDICINE
Payer: SELF-PAY

## 2018-04-19 VITALS
HEIGHT: 72 IN | SYSTOLIC BLOOD PRESSURE: 135 MMHG | RESPIRATION RATE: 18 BRPM | BODY MASS INDEX: 25.06 KG/M2 | HEART RATE: 78 BPM | WEIGHT: 185 LBS | TEMPERATURE: 98 F | OXYGEN SATURATION: 98 % | DIASTOLIC BLOOD PRESSURE: 68 MMHG

## 2018-04-19 DIAGNOSIS — L24.7 IRRITANT CONTACT DERMATITIS DUE TO PLANTS, EXCEPT FOOD: Primary | ICD-10-CM

## 2018-04-19 PROCEDURE — 99282 EMERGENCY DEPT VISIT SF MDM: CPT

## 2018-04-19 RX ORDER — PREDNISONE 20 MG/1
60 TABLET ORAL DAILY
Qty: 15 TAB | Refills: 0 | Status: SHIPPED | OUTPATIENT
Start: 2018-04-19 | End: 2018-04-24

## 2018-04-20 NOTE — ED NOTES
Discharge instructions were given to the patient by provider and Nelly Johnson RN. The patient left the Emergency Department ambulatory, alert and oriented and in no acute distress with 1 prescription. The patient was encouraged to call or return to the ED for worsening issues or problems and was encouraged to schedule a follow up appointment for continuing care. The patient verbalized understanding of discharge instructions and prescriptions, all questions were answered. The patient has no further concerns at this time.

## 2018-04-20 NOTE — ED PROVIDER NOTES
EMERGENCY DEPARTMENT HISTORY AND PHYSICAL EXAM      Date: 4/19/2018  Patient Name: Carlen Saint    History of Presenting Illness     Chief Complaint   Patient presents with    Skin Problem       History Provided By: Patient    HPI: Carlen Saint, 59 y.o. male with PMHx significant for DM, presents ambulatory to the ED with cc of gradually worsening rash to his L arm, and L sided abdomen x 6 days. Pt reports noticing the rash on 04/13/2018 at night, and notes thinking it could be poison ivy. He states that the rash used to be itchy, however states that the itchiness has since subsided. Of note, he is on Metformin daily for his hx of DM, and further reports checking his blood glucose every morning. There are no other denying factors at this time. Social Hx:  ETOH: +  Tobacco: +, 1ppd  Illicit drug use: +; cocaine, heroin. PCP: PROVIDER UNKNOWN    There are no other complaints, changes, or physical findings at this time. Current Outpatient Prescriptions   Medication Sig Dispense Refill    predniSONE (DELTASONE) 20 mg tablet Take 3 Tabs by mouth daily for 5 days. With Breakfast 15 Tab 0    DIPHENHYDRAMINE HCL/CALAMINE (IVAREST EX) by Apply Externally route.  metFORMIN (GLUCOPHAGE) 1,000 mg tablet Take 1 Tab by mouth two (2) times daily (with meals). 180 Tab 3    glucose blood VI test strips (BLOOD GLUCOSE TEST) strip Check BG 1-2 times/day 100 Strip 11    Blood-Glucose Meter (RELION ALL-IN-ONE METER) monitoring kit Check BG 1-2 times/day 1 Kit 0    erythromycin (ILOTYCIN) ophthalmic ointment 1/2 inch to conjunctival sac (lower lid) four times daily for 7 days. 1 g 0    glimepiride (AMARYL) 2 mg tablet Take 1 Tab by mouth every morning. 90 Tab 3    loratadine-pseudoephedrine (CLARITIN-D 12 HOUR) 5-120 mg per tablet Take 1 Tab by mouth two (2) times a day. 30 Tab 0    fluticasone (FLONASE) 50 mcg/actuation nasal spray 2 Sprays by Both Nostrils route daily.  1 Bottle 0    SILDENAFIL CITRATE (VIAGRA PO) Take  by mouth. Past History     Past Medical History:  Past Medical History:   Diagnosis Date    [facility] [address]     Diabetes mellitus ([facility] 7/11/2012    Eczema 7/11/2012       Past Surgical History:  Past Surgical History:   Procedure Laterality Date    HX ORTHOPAEDIC      L knee scope       Family History:  Family History   Problem Relation Age of Onset    Hypertension Mother     Arthritis-osteo Mother        Social History:  Social History   Substance Use Topics    Smoking status: Current Every Day Smoker     Packs/day: 1.00    Smokeless tobacco: Current User    Alcohol use 5.0 oz/week     5 Cans of beer, 5 Standard drinks or equivalent per week      Comment: daily       Allergies: Allergies   Allergen Reactions    Doxycycline Itching    Tramadol Itching    Pcn [Penicillins] Hives         Review of Systems   Review of Systems   Constitutional: Negative. Negative for chills and fever. HENT: Negative. Negative for congestion, rhinorrhea, sneezing and sore throat. Eyes: Negative. Negative for redness and visual disturbance. Respiratory: Negative. Negative for shortness of breath. Cardiovascular: Negative. Negative for chest pain and leg swelling. Gastrointestinal: Negative. Negative for abdominal pain, nausea and vomiting. Endocrine: Negative. Genitourinary: Negative. Negative for difficulty urinating and frequency. Musculoskeletal: Negative. Negative for back pain, myalgias and neck stiffness. Skin: Positive for rash. Allergic/Immunologic: Negative. Neurological: Negative. Negative for dizziness, syncope, weakness and headaches. Hematological: Negative. Negative for adenopathy. Psychiatric/Behavioral: Negative. All other systems reviewed and are negative. Physical Exam   Physical Exam   Constitutional: He is oriented to person, place, and time. He appears well-developed and well-nourished. HENT:   Head: Normocephalic and atraumatic. Right Ear: External ear normal.   Left Ear: External ear normal.   Mouth/Throat: Oropharynx is clear and moist and mucous membranes are normal. No oropharyngeal exudate. Eyes: Conjunctivae and EOM are normal. Pupils are equal, round, and reactive to light. Right eye exhibits no discharge. Left eye exhibits no discharge. No scleral icterus. Neck: Normal range of motion and full passive range of motion without pain. Neck supple. No tracheal deviation present. No thyromegaly present. Cardiovascular: Normal rate, regular rhythm, normal heart sounds, intact distal pulses and normal pulses. No murmur heard. Pulmonary/Chest: Effort normal and breath sounds normal. No respiratory distress. He has no wheezes. He has no rales. He exhibits no tenderness. Abdominal: Soft. Normal appearance and bowel sounds are normal. He exhibits no distension. There is no tenderness. There is no rebound and no guarding. Musculoskeletal: Normal range of motion. He exhibits no edema or tenderness. Lymphadenopathy:     He has no cervical adenopathy. Neurological: He is alert and oriented to person, place, and time. He has normal strength. No cranial nerve deficit. Coordination normal.   Skin: Skin is warm, dry and intact. Rash noted. Rash is vesicular. No erythema. 6cm area of vesicular rash with an erythematous base on the L antecubital area. Vesicular rash to the L lateral abdomen with only a few lesions. Psychiatric: He has a normal mood and affect. His speech is normal and behavior is normal. Judgment and thought content normal.   Nursing note and vitals reviewed. Medical Decision Making   I am the first provider for this patient. I reviewed the vital signs, available nursing notes, past medical history, past surgical history, family history and social history. Vital Signs-Reviewed the patient's vital signs.   Patient Vitals for the past 12 hrs:   Temp Pulse Resp BP SpO2   04/19/18 2207 98 °F (36.7 °C) 78 18 135/68 98 %     Provider Notes (Medical Decision Making):   Contact dermatitis vs shingles. Based on exposure likely contact dermatitis. Will treat with prednisone. Patient is diabetic, educated on possible increase in home blood sugars. ED Course:   Initial assessment performed. The patients presenting problems have been discussed, and they are in agreement with the care plan formulated and outlined with them. I have encouraged them to ask questions as they arise throughout their visit. Disposition:  DISCHARGE NOTE  10:45 PM  The patient has been re-evaluated and is ready for discharge. Reviewed available results with patient. Counseled pt on diagnosis and care plan. Pt has expressed understanding, and all questions have been answered. Pt agrees with plan and agrees to F/U as recommended, or return to the ED if their sxs worsen. Discharge instructions have been provided and explained to the pt, along with reasons to return to the ED. PLAN:  1. Discharge Medication List as of 4/19/2018 10:26 PM      START taking these medications    Details   predniSONE (DELTASONE) 20 mg tablet Take 3 Tabs by mouth daily for 5 days. With Breakfast, Print, Disp-15 Tab, R-0         CONTINUE these medications which have NOT CHANGED    Details   metFORMIN (GLUCOPHAGE) 1,000 mg tablet Take 1 Tab by mouth two (2) times daily (with meals). , Normal, Disp-180 Tab, R-3      glucose blood VI test strips (BLOOD GLUCOSE TEST) strip Check BG 1-2 times/day, Normal, Disp-100 Strip, R-11      Blood-Glucose Meter (RELION ALL-IN-ONE METER) monitoring kit Check BG 1-2 times/day, Normal, Disp-1 Kit, R-0      erythromycin (ILOTYCIN) ophthalmic ointment 1/2 inch to conjunctival sac (lower lid) four times daily for 7 days. , Normal, Disp-1 g, R-0      glimepiride (AMARYL) 2 mg tablet Take 1 Tab by mouth every morning., NormalTo replace glipizideDisp-90 Tab, R-3      loratadine-pseudoephedrine (CLARITIN-D 12 HOUR) 5-120 mg per tablet Take 1 Tab by mouth two (2) times a day., Print, Disp-30 Tab, R-0      fluticasone (FLONASE) 50 mcg/actuation nasal spray 2 Sprays by Both Nostrils route daily. , Print, Disp-1 Bottle, R-0      SILDENAFIL CITRATE (VIAGRA PO) Take  by mouth., Historical Med           2. Follow-up Information     Follow up With Details Comments Contact Info    Provider Unknown Schedule an appointment as soon as possible for a visit  Patient not available to ask      [facility] EMERGENCY DEPT  If symptoms worsen [place]  [phone]        Return to ED if worse     Diagnosis     Clinical Impression:   1. Irritant contact dermatitis due to plants, except food        Attestations: This note is prepared by Mayela William, acting as Scribe for MOHAMUD. The scribe's documentation has been prepared under my direction and personally reviewed by me in its entirety. I confirm that the note above accurately reflects all work, treatment, procedures, and medical decision making performed by me.   Luana Lynch PA-C

## 2018-04-20 NOTE — DISCHARGE INSTRUCTIONS
Dermatitis: Care Instructions  Your Care Instructions  Dermatitis is the general name used for any rash or inflammation of the skin. Different kinds of dermatitis cause different kinds of rashes. Common causes of a rash include new medicines, plants (such as poison oak or poison ivy), heat, and stress. Certain illnesses can also cause a rash. An allergic reaction to something that touches your skin, such as latex, nickel, or poison ivy, is called contact dermatitis. Contact dermatitis may also be caused by something that irritates the skin, such as bleach, a chemical, or soap. These types of rashes cannot be spread from person to person. How long your rash will last depends on what caused it. Rashes may last a few days or months. Follow-up care is a key part of your treatment and safety. Be sure to make and go to all appointments, and call your doctor if you are having problems. It's also a good idea to know your test results and keep a list of the medicines you take. How can you care for yourself at home? · Do not scratch the rash. Cut your nails short, and file them smooth. Or wear gloves if this helps keep you from scratching. · Wash the area with water only. Pat dry. · Put cold, wet cloths on the rash to reduce itching. · Keep cool, and stay out of the sun. · Leave the rash open to the air as much as possible. · If the rash itches, use hydrocortisone cream. Follow the directions on the label. Calamine lotion may help for plant rashes. · Take an over-the-counter antihistamine, such as diphenhydramine (Benadryl) or loratadine (Claritin), to help calm the itching. Read and follow all instructions on the label. · If your doctor prescribed a cream, use it as directed. If your doctor prescribed medicine, take it exactly as directed. When should you call for help?   Call your doctor now or seek immediate medical care if:  ? · You have symptoms of infection, such as:  ¨ Increased pain, swelling, warmth, or redness. ¨ Red streaks leading from the area. ¨ Pus draining from the area. ¨ A fever. ? · You have joint pain along with the rash. ? Watch closely for changes in your health, and be sure to contact your doctor if:  ? · Your rash is changing or getting worse. ? · You are not getting better as expected. Where can you learn more? Go to http://ajit-david.info/. Enter ([phone] in the search box to learn more about \"Dermatitis: Care Instructions. \"  Current as of: October 13, 2016  Content Version: 11.4  © 0191-8753 [employer]. Care instructions adapted under license by [employer] (which disclaims liability or warranty for this information). If you have questions about a medical condition or this instruction, always ask your healthcare professional. [address] any warranty or liability for your use of this information.

## 2018-04-20 NOTE — ED NOTES
Pt presents to ED ambulatory complaining of skin problem x 6 days. Pt reports working in the yard last Friday and that night noticed a rash on his left forearm and left side. Pt noted to have lesions full of clear fluid surrounded by red, irritated skin in reported areas. Pt reports areas itch. Pt reports using IVAREST cream with no relief of symptoms. Pt is alert and oriented x 4, RR even and unlabored. Assessment completed and pt updated on plan of care. Emergency Department Nursing Plan of Care       The Nursing Plan of Care is developed from the Nursing assessment and Emergency Department Attending provider initial evaluation. The plan of care may be reviewed in the ED Provider note.     The Plan of Care was developed with the following considerations:   Patient / Family readiness to learn indicated by:verbalized understanding  Persons(s) to be included in education: patient  Barriers to Learning/Limitations:No    Signed     Yuly Chappell    4/19/2018   10:32 PM

## 2018-04-20 NOTE — ED TRIAGE NOTES
Patient believes he has poison ivy to right arm and right side of abd x 1 week. Using calamine lotion without any relief.

## 2018-05-19 ENCOUNTER — HOSPITAL ENCOUNTER (EMERGENCY)
Age: 58
Discharge: HOME | End: 2018-05-19
Attending: EMERGENCY MEDICINE
Payer: SELF-PAY

## 2018-05-19 ENCOUNTER — APPOINTMENT (OUTPATIENT)
Dept: GENERAL RADIOLOGY | Age: 58
End: 2018-05-19
Attending: INTERNAL MEDICINE
Payer: SELF-PAY

## 2018-05-19 VITALS
OXYGEN SATURATION: 100 % | WEIGHT: 170 LBS | BODY MASS INDEX: 23.03 KG/M2 | HEIGHT: 72 IN | SYSTOLIC BLOOD PRESSURE: 138 MMHG | TEMPERATURE: 97.9 F | RESPIRATION RATE: 16 BRPM | HEART RATE: 70 BPM | DIASTOLIC BLOOD PRESSURE: 71 MMHG

## 2018-05-19 DIAGNOSIS — R73.9 HYPERGLYCEMIA: ICD-10-CM

## 2018-05-19 DIAGNOSIS — M25.561 ACUTE PAIN OF RIGHT KNEE: Primary | ICD-10-CM

## 2018-05-19 LAB
GLUCOSE BLD STRIP.AUTO-MCNC: 187 MG/DL (ref 65–100)
SERVICE CMNT-IMP: ABNORMAL

## 2018-05-19 PROCEDURE — 99283 EMERGENCY DEPT VISIT LOW MDM: CPT

## 2018-05-19 PROCEDURE — 82962 GLUCOSE BLOOD TEST: CPT

## 2018-05-19 PROCEDURE — 74011250637 HC RX REV CODE- 250/637: Performed by: EMERGENCY MEDICINE

## 2018-05-19 PROCEDURE — 73562 X-RAY EXAM OF KNEE 3: CPT

## 2018-05-19 RX ORDER — NAPROXEN 250 MG/1
500 TABLET ORAL
Status: COMPLETED | OUTPATIENT
Start: 2018-05-19 | End: 2018-05-19

## 2018-05-19 RX ORDER — HYDROCODONE BITARTRATE AND ACETAMINOPHEN 5; 325 MG/1; MG/1
1 TABLET ORAL
Qty: 5 TAB | Refills: 0 | Status: SHIPPED | OUTPATIENT
Start: 2018-05-19 | End: 2018-08-28

## 2018-05-19 RX ORDER — NAPROXEN 500 MG/1
500 TABLET ORAL
Qty: 20 TAB | Refills: 0 | Status: SHIPPED | OUTPATIENT
Start: 2018-05-19 | End: 2018-08-28

## 2018-05-19 RX ORDER — HYDROCODONE BITARTRATE AND ACETAMINOPHEN 5; 325 MG/1; MG/1
1 TABLET ORAL
Status: COMPLETED | OUTPATIENT
Start: 2018-05-19 | End: 2018-05-19

## 2018-05-19 RX ADMIN — HYDROCODONE BITARTRATE AND ACETAMINOPHEN 1 TABLET: 5; 325 TABLET ORAL at 19:12

## 2018-05-19 RX ADMIN — NAPROXEN 500 MG: 250 TABLET ORAL at 19:13

## 2018-05-19 NOTE — ED NOTES
Patient (s)  given copy of dc instructions and 2 script(s). Patient(s)  verbalized understanding of instructions and script (s). Patient given a current medication reconciliation form and verbalized understanding of their medications. Patient (s) verbalized understanding of the importance of discussing medications with  his or her physician or clinic when they follow up. Patient alert and oriented and in no acute distress. Pt verbalizes pain scale of 8 out of 10. Patient discharged home ambulatory with friend.

## 2018-05-19 NOTE — ED PROVIDER NOTES
EMERGENCY DEPARTMENT HISTORY AND PHYSICAL EXAM      Date: 5/19/2018  Patient Name: Sandra Kirkpatrick    History of Presenting Illness     Chief Complaint   Patient presents with    Knee Pain     right knee, he states he hit it on something a couple weeks ago and thinks there may be a \"bruise on the inside\"       History Provided By: Patient    HPI: Sandra Kirkpatrick, 59 y.o. male with PMHx significant for eczema, DM who presents ambulatory to the ED with cc of gradually worsening mild right lateral knee pain x 2-3 weeks. Pt denies any associated symptoms. He reports taking ibuprofen 400 mg as well as applying Bengay with minimal relief of his symptoms. Pt notes that his symptoms have exacerbated beyond toleration over the last 4-5 days. He states that his symptoms are exacerbated with standing and palpation. Pt reports that he either twisted his knee coming down from a ladder or hit his knee with a ladder prior to the onset of his symptoms noting that he works as a . He denies any numbness, weakness, joint swelling, nausea, vomiting, HA, fever,s or chills. There are no other complaints, changes, or physical findings at this time. PCP: PROVIDER UNKNOWN    Current Outpatient Prescriptions   Medication Sig Dispense Refill    HYDROcodone-acetaminophen (NORCO) 5-325 mg per tablet Take 1 Tab by mouth every four (4) hours as needed for Pain. Max Daily Amount: 6 Tabs. 5 Tab 0    naproxen (NAPROSYN) 500 mg tablet Take 1 Tab by mouth every twelve (12) hours as needed for Pain. 20 Tab 0    DIPHENHYDRAMINE HCL/CALAMINE (IVAREST EX) by Apply Externally route.  erythromycin (ILOTYCIN) ophthalmic ointment 1/2 inch to conjunctival sac (lower lid) four times daily for 7 days. 1 g 0    glimepiride (AMARYL) 2 mg tablet Take 1 Tab by mouth every morning. 90 Tab 3    metFORMIN (GLUCOPHAGE) 1,000 mg tablet Take 1 Tab by mouth two (2) times daily (with meals).  180 Tab 3    glucose blood VI test strips (BLOOD GLUCOSE TEST) strip Check BG 1-2 times/day 100 Strip 11    loratadine-pseudoephedrine (CLARITIN-D 12 HOUR) 5-120 mg per tablet Take 1 Tab by mouth two (2) times a day. 30 Tab 0    fluticasone (FLONASE) 50 mcg/actuation nasal spray 2 Sprays by Both Nostrils route daily. 1 Bottle 0    SILDENAFIL CITRATE (VIAGRA PO) Take  by mouth.  Blood-Glucose Meter (RELION ALL-IN-ONE METER) monitoring kit Check BG 1-2 times/day 1 Kit 0       Past History     Past Medical History:  Past Medical History:   Diagnosis Date    Diabetes ([facility] [address]     Diabetes mellitus ([facility] [address] 7/11/2012    Eczema 7/11/2012       Past Surgical History:  Past Surgical History:   Procedure Laterality Date    HX ORTHOPAEDIC      L knee scope       Family History:  Family History   Problem Relation Age of Onset    Hypertension Mother     Arthritis-osteo Mother        Social History:  Social History   Substance Use Topics    Smoking status: Current Every Day Smoker     Packs/day: 1.00    Smokeless tobacco: Current User    Alcohol use 5.0 oz/week     5 Cans of beer, 5 Standard drinks or equivalent per week      Comment: daily       Allergies: Allergies   Allergen Reactions    Doxycycline Itching    Tramadol Itching    Pcn [Penicillins] Hives         Review of Systems   Review of Systems   Constitutional: Negative for chills and fever. HENT: Negative for congestion, rhinorrhea, sneezing and sore throat. Eyes: Negative for redness and visual disturbance. Respiratory: Negative for shortness of breath. Cardiovascular: Negative for chest pain and leg swelling. Gastrointestinal: Negative for abdominal pain, nausea and vomiting. Genitourinary: Negative for difficulty urinating and frequency. Musculoskeletal: Positive for arthralgias (right lateral knee). Negative for back pain, joint swelling, myalgias and neck stiffness. Skin: Negative for rash. Neurological: Negative for dizziness, syncope, weakness, numbness and headaches.    Hematological: Negative for adenopathy. All other systems reviewed and are negative. Physical Exam   Physical Exam   Constitutional: He is oriented to person, place, and time. He appears well-developed and well-nourished. HENT:   Head: Normocephalic and atraumatic. Mouth/Throat: Oropharynx is clear and moist and mucous membranes are normal.   Eyes: EOM are normal.   Neck: Normal range of motion and full passive range of motion without pain. Neck supple. Cardiovascular: Normal rate, regular rhythm, normal heart sounds, intact distal pulses and normal pulses. No murmur heard. Pulmonary/Chest: Effort normal and breath sounds normal. No respiratory distress. He exhibits no tenderness. Abdominal: Soft. Normal appearance and bowel sounds are normal. There is no tenderness. There is no rebound and no guarding. Musculoskeletal:   Tenderness right lateral knee. Full ROM. No deformity. Neurological: He is alert and oriented to person, place, and time. He has normal strength. Skin: Skin is warm, dry and intact. No rash noted. No erythema. Psychiatric: He has a normal mood and affect. His speech is normal and behavior is normal. Judgment and thought content normal.   Nursing note and vitals reviewed. Diagnostic Study Results     Labs -     Recent Results (from the past 12 hour(s))   GLUCOSE, POC    Collection Time: 05/19/18  6:03 PM   Result Value Ref Range    Glucose (POC) 187 (H) 65 - 100 mg/dL    Performed by Maurice Castro \"Lisa\" Zacarias Pearce        Radiologic Studies -   XR KNEE RT 3 V   Final Result   EXAM:  XR KNEE RT 3 V     INDICATION:   traumatic injury two weeks ago.     COMPARISON: None.     FINDINGS: Three views of the right knee demonstrate no fracture or other acute  osseous or articular abnormality. There is a small joint effusion. Early DJD  of the patella and medial joint space with spurring and narrowing.     IMPRESSION  IMPRESSION:  No acute abnormality.      Medical Decision Making   I am the first provider for this patient. I reviewed the vital signs, available nursing notes, past medical history, past surgical history, family history and social history. Vital Signs-Reviewed the patient's vital signs. Patient Vitals for the past 12 hrs:   Temp Pulse Resp BP SpO2   05/19/18 1759 97.9 °F (36.6 °C) 70 16 138/71 100 %     Records Reviewed: Nursing Notes and Old Medical Records    Provider Notes (Medical Decision Making):     Sprain, strain, contusion, ligamentous injury, arthritis    ED Course:   Initial assessment performed. The patients presenting problems have been discussed, and they are in agreement with the care plan formulated and outlined with them. I have encouraged them to ask questions as they arise throughout their visit. Disposition:    DISCHARGE NOTE  7:38 PM  The patient has been re-evaluated and is ready for discharge. Reviewed available results with patient. Counseled patient on diagnosis and care plan. Patient has expressed understanding, and all questions have been answered. Patient agrees with plan and agrees to follow up as recommended, or return to the ED if their symptoms worsen. Discharge instructions have been provided and explained to the patient, along with reasons to return to the ED. PLAN:  1. Discharge Medication List as of 5/19/2018  7:38 PM      START taking these medications    Details   HYDROcodone-acetaminophen (NORCO) 5-325 mg per tablet Take 1 Tab by mouth every four (4) hours as needed for Pain. Max Daily Amount: 6 Tabs., Print, Disp-5 Tab, R-0      naproxen (NAPROSYN) 500 mg tablet Take 1 Tab by mouth every twelve (12) hours as needed for Pain., Normal, Disp-20 Tab, R-0         CONTINUE these medications which have NOT CHANGED    Details   DIPHENHYDRAMINE HCL/CALAMINE (IVAREST EX) by Apply Externally route., Historical Med      erythromycin (ILOTYCIN) ophthalmic ointment 1/2 inch to conjunctival sac (lower lid) four times daily for 7 days. , Normal, Disp-1 g, R-0      glimepiride (AMARYL) 2 mg tablet Take 1 Tab by mouth every morning., NormalTo replace glipizideDisp-90 Tab, R-3      metFORMIN (GLUCOPHAGE) 1,000 mg tablet Take 1 Tab by mouth two (2) times daily (with meals). , Normal, Disp-180 Tab, R-3      glucose blood VI test strips (BLOOD GLUCOSE TEST) strip Check BG 1-2 times/day, Normal, Disp-100 Strip, R-11      loratadine-pseudoephedrine (CLARITIN-D 12 HOUR) 5-120 mg per tablet Take 1 Tab by mouth two (2) times a day., Print, Disp-30 Tab, R-0      fluticasone (FLONASE) 50 mcg/actuation nasal spray 2 Sprays by Both Nostrils route daily. , Print, Disp-1 Bottle, R-0      SILDENAFIL CITRATE (VIAGRA PO) Take  by mouth., Historical Med      Blood-Glucose Meter (RELION ALL-IN-ONE METER) monitoring kit Check BG 1-2 times/day, Normal, Disp-1 Kit, R-0           2. Follow-up Information     Follow up With Details Comments [address] Schedule an appointment as soon as possible for a visit  [address]  [address] [facility],[address]  [address]   [address] [facility] EMERGENCY DEPT  As needed, If symptoms worsen [address] [facility]  [phone]        Return to ED if worse     Diagnosis     Clinical Impression:   1. Acute pain of right knee    2. Hyperglycemia        Attestations: This note is prepared by Dm Burks, acting as Scribe for Felix Rucker MD.    Felix Rucker MD: The scribe's documentation has been prepared under my direction and personally reviewed by me in its entirety. I confirm that the note above accurately reflects all work, treatment, procedures, and medical decision making performed by me.

## 2018-05-19 NOTE — DISCHARGE INSTRUCTIONS
Joint Pain: Care Instructions  Your Care Instructions    Many people have small aches and pains from overuse or injury to muscles and joints. Joint injuries often happen during sports or recreation, work tasks, or projects around the home. An overuse injury can happen when you put too much stress on a joint or when you do an activity that stresses the joint over and over, such as using the computer or rowing a boat. You can take action at home to help your muscles and joints get better. You should feel better in 1 to 2 weeks, but it can take 3 months or more to heal completely. Follow-up care is a key part of your treatment and safety. Be sure to make and go to all appointments, and call your doctor if you are having problems. It's also a good idea to know your test results and keep a list of the medicines you take. How can you care for yourself at home? · Do not put weight on the injured joint for at least a day or two. · For the first day or two after an injury, do not take hot showers or baths, and do not use hot packs. The heat could make swelling worse. · Put ice or a cold pack on the sore joint for 10 to 20 minutes at a time. Try to do this every 1 to 2 hours for the next 3 days (when you are awake) or until the swelling goes down. Put a thin cloth between the ice and your skin. · Wrap the injury in an elastic bandage. Do not wrap it too tightly because this can cause more swelling. · Prop up the sore joint on a pillow when you ice it or anytime you sit or lie down during the next 3 days. Try to keep it above the level of your heart. This will help reduce swelling. · Take an over-the-counter pain medicine, such as acetaminophen (Tylenol), ibuprofen (Advil, Motrin), or naproxen (Aleve). Read and follow all instructions on the label. · After 1 or 2 days of rest, begin moving the joint gently.  While the joint is still healing, you can begin to exercise using activities that do not strain or hurt the painful joint. When should you call for help? Call your doctor now or seek immediate medical care if:  ? · You have signs of infection, such as:  ¨ Increased pain, swelling, warmth, and redness. ¨ Red streaks leading from the joint. ¨ A fever. ? Watch closely for changes in your health, and be sure to contact your doctor if:  ? · Your movement or symptoms are not getting better after 1 to 2 weeks of home treatment. Where can you learn more? Go to http://ajit-david.info/. Enter P205 in the search box to learn more about \"Joint Pain: Care Instructions. \"  Current as of: March 21, 2017  Content Version: 11.4  © 6757-7371 [employer]. Care instructions adapted under license by [employer] (which disclaims liability or warranty for this information). If you have questions about a medical condition or this instruction, always ask your healthcare professional. [place] [address] any warranty or liability for your use of this information. Knee Pain or Injury: Care Instructions  Your Care Instructions    Injuries are a common cause of knee problems. Sudden (acute) injuries may be caused by a direct blow to the knee. They can also be caused by abnormal twisting, bending, or falling on the knee. Pain, bruising, or swelling may be severe, and may start within minutes of the injury. Overuse is another cause of knee pain. Other causes are climbing stairs, kneeling, and other activities that use the knee. Everyday wear and tear, especially as you get older, also can cause knee pain. Rest, along with home treatment, often relieves pain and allows your knee to heal. If you have a serious knee injury, you may need tests and treatment. Follow-up care is a key part of your treatment and safety. Be sure to make and go to all appointments, and call your doctor if you are having problems.  It's also a good idea to know your test results and keep a list of the medicines you take. How can you care for yourself at home? · Be safe with medicines. Read and follow all instructions on the label. ¨ If the doctor gave you a prescription medicine for pain, take it as prescribed. ¨ If you are not taking a prescription pain medicine, ask your doctor if you can take an over-the-counter medicine. · Rest and protect your knee. Take a break from any activity that may cause pain. · Put ice or a cold pack on your knee for 10 to 20 minutes at a time. Put a thin cloth between the ice and your skin. · Prop up a sore knee on a pillow when you ice it or anytime you sit or lie down for the next 3 days. Try to keep it above the level of your heart. This will help reduce swelling. · If your knee is not swollen, you can put moist heat, a heating pad, or a warm cloth on your knee. · If your doctor recommends an elastic bandage, sleeve, or other type of support for your knee, wear it as directed. · Follow your doctor's instructions about how much weight you can put on your leg. Use a cane, crutches, or a walker as instructed. · Follow your doctor's instructions about activity during your healing process. If you can do mild exercise, slowly increase your activity. · Reach and stay at a healthy weight. Extra weight can strain the joints, especially the knees and hips, and make the pain worse. Losing even a few pounds may help. When should you call for help? Call 911 anytime you think you may need emergency care. For example, call if:  ? · You have symptoms of a blood clot in your lung (called a pulmonary embolism). These may include:  ¨ Sudden chest pain. ¨ Trouble breathing. ¨ Coughing up blood. ?Call your doctor now or seek immediate medical care if:  ? · You have severe or increasing pain. ? · Your leg or foot turns cold or changes color. ? · You cannot stand or put weight on your knee. ? · Your knee looks twisted or bent out of shape. ? · You cannot move your knee. ? · You have signs of infection, such as:  ¨ Increased pain, swelling, warmth, or redness. ¨ Red streaks leading from the knee. ¨ Pus draining from a place on your knee. ¨ A fever. ? · You have signs of a blood clot in your leg (called a deep vein thrombosis), such as:  ¨ Pain in your calf, back of the knee, thigh, or groin. ¨ Redness and swelling in your leg or groin. ? Watch closely for changes in your health, and be sure to contact your doctor if:  ? · You have tingling, weakness, or numbness in your knee. ? · You have any new symptoms, such as swelling. ? · You have bruises from a knee injury that last longer than 2 weeks. ? · You do not get better as expected. Where can you learn more? Go to http://ajit-david.info/. Enter K195 in the search box to learn more about \"Knee Pain or Injury: Care Instructions. \"  Current as of: March 20, 2017  Content Version: 11.4  © 7076-3477 [employer]. Care instructions adapted under license by [employer] (which disclaims liability or warranty for this information). If you have questions about a medical condition or this instruction, always ask your healthcare professional. [address] any warranty or liability for your use of this information.

## 2018-05-19 NOTE — ED NOTES
States he did something to right knee a couple of weeks ago but doesn't remember what. Has right knee lateral tenderness. No swelling appreciated. No bruising noted.

## 2018-08-28 ENCOUNTER — HOSPITAL ENCOUNTER (EMERGENCY)
Age: 58
Discharge: HOME | End: 2018-08-28
Attending: EMERGENCY MEDICINE | Admitting: EMERGENCY MEDICINE
Payer: SELF-PAY

## 2018-08-28 VITALS
HEART RATE: 74 BPM | SYSTOLIC BLOOD PRESSURE: 136 MMHG | WEIGHT: 185 LBS | TEMPERATURE: 97.8 F | BODY MASS INDEX: 24.52 KG/M2 | OXYGEN SATURATION: 99 % | HEIGHT: 73 IN | RESPIRATION RATE: 18 BRPM | DIASTOLIC BLOOD PRESSURE: 74 MMHG

## 2018-08-28 DIAGNOSIS — E86.0 DEHYDRATION: Primary | ICD-10-CM

## 2018-08-28 LAB
ANION GAP BLD CALC-SCNC: 18 MMOL/L (ref 10–20)
ATRIAL RATE: 67 BPM
BUN BLD-MCNC: 21 MG/DL (ref 9–20)
CA-I BLD-MCNC: 1.16 MMOL/L (ref 1.12–1.32)
CALCULATED P AXIS, ECG09: 37 DEGREES
CALCULATED R AXIS, ECG10: 3 DEGREES
CALCULATED T AXIS, ECG11: 36 DEGREES
CHLORIDE BLD-SCNC: 104 MMOL/L (ref 98–107)
CO2 BLD-SCNC: 26 MMOL/L (ref 21–32)
CREAT BLD-MCNC: 0.9 MG/DL (ref 0.6–1.3)
DIAGNOSIS, 93000: NORMAL
GLUCOSE BLD STRIP.AUTO-MCNC: 133 MG/DL (ref 65–100)
GLUCOSE BLD-MCNC: 134 MG/DL (ref 65–100)
HCT VFR BLD CALC: 41 % (ref 36.6–50.3)
P-R INTERVAL, ECG05: 150 MS
POTASSIUM BLD-SCNC: 3.8 MMOL/L (ref 3.5–5.1)
Q-T INTERVAL, ECG07: 410 MS
QRS DURATION, ECG06: 86 MS
QTC CALCULATION (BEZET), ECG08: 433 MS
SERVICE CMNT-IMP: ABNORMAL
SERVICE CMNT-IMP: ABNORMAL
SODIUM BLD-SCNC: 143 MMOL/L (ref 136–145)
TROPONIN I BLD-MCNC: <0.04 NG/ML (ref 0–0.08)
VENTRICULAR RATE, ECG03: 67 BPM

## 2018-08-28 PROCEDURE — 84484 ASSAY OF TROPONIN QUANT: CPT

## 2018-08-28 PROCEDURE — 80047 BASIC METABLC PNL IONIZED CA: CPT

## 2018-08-28 PROCEDURE — 99284 EMERGENCY DEPT VISIT MOD MDM: CPT

## 2018-08-28 PROCEDURE — 82962 GLUCOSE BLOOD TEST: CPT

## 2018-08-28 PROCEDURE — 93005 ELECTROCARDIOGRAM TRACING: CPT

## 2018-08-28 NOTE — ED PROVIDER NOTES
EMERGENCY DEPARTMENT HISTORY AND PHYSICAL EXAM 
 
 
Date: 8/28/2018 Patient Name: Francis Cortes History of Presenting Illness Chief Complaint Patient presents with  Low Blood Sugar  
  pt sts he is feeling jittery Pt is a diabetic and unsure what his BG is  
 
 
History Provided By: Patient HPI: Francis Cortes, 59 y.o. male with PMHx significant for Diabetes (conctrolled on Metformin) and eczema, presents ambulatory to the ED with cc of acute near-syncope episode while driving x 15 just pta. Pt endorses he was driving down the road to work when he all the sudden began to feel lightheaded and \"whoozy\". He kept his foot on the brake and pulled over. Another  came to assess him and then drove him to ED. Pt endorses hx of similar sxs in the past with hypoglycemia. He also endorses he had an argument this morning that caused increased stress. +bilateral eye pain and confused state during episode but no LOC/ fall/ head injury. No EMS on scene. Pt also endorses mild jitteriness. Pt had one chicken sandwich this AM and cup of coffee. Mild improvement of symptoms in ED presently but continues to endorses fatigue/ lightheadedness. Denies CP, sob, dyspnea,cough, recent illness, fever, chills, n/v, palpitations, leg swelling, abd pain, focal weakness, numbness/tingling. There are no other complaints, changes, or physical findings at this time. PCP: PROVIDER UNKNOWN Current Outpatient Prescriptions Medication Sig Dispense Refill  metFORMIN (GLUCOPHAGE) 1,000 mg tablet Take 1 Tab by mouth two (2) times daily (with meals). 180 Tab 3  
 glucose blood VI test strips (BLOOD GLUCOSE TEST) strip Check BG 1-2 times/day 100 Strip 11  Blood-Glucose Meter (RELION ALL-IN-ONE METER) monitoring kit Check BG 1-2 times/day 1 Kit 0  
 DIPHENHYDRAMINE HCL/CALAMINE (IVAREST EX) by Apply Externally route.  glimepiride (AMARYL) 2 mg tablet Take 1 Tab by mouth every morning.  90 Tab 3  
  SILDENAFIL CITRATE (VIAGRA PO) Take  by mouth. Past History Past Medical History: 
Past Medical History:  
Diagnosis Date  Diabetes ([facility] [address]  Diabetes mellitus ([facility] [address] 7/11/2012  Eczema 7/11/2012 Past Surgical History: 
Past Surgical History:  
Procedure Laterality Date  HX ORTHOPAEDIC    
 L knee scope Family History: 
Family History Problem Relation Age of Onset  Hypertension Mother [facility] Arthritis-osteo Mother Social History: 
Social History Substance Use Topics  Smoking status: Current Every Day Smoker Packs/day: 1.00  Smokeless tobacco: Current User  Alcohol use 5.0 oz/week 5 Cans of beer, 5 Standard drinks or equivalent per week Allergies: Allergies Allergen Reactions  Doxycycline Itching  Tramadol Itching  Pcn [Penicillins] Hives Review of Systems Review of Systems Constitutional: Positive for fatigue. Negative for activity change, appetite change, chills, diaphoresis and fever. HENT: Negative for congestion, dental problem, ear discharge, ear pain, facial swelling, postnasal drip, sinus pressure and sneezing. Eyes: Negative for photophobia, pain, redness and visual disturbance. Respiratory: Negative for cough, choking, chest tightness, shortness of breath and wheezing. Cardiovascular: Negative for chest pain, palpitations and leg swelling. Gastrointestinal: Negative for abdominal pain, constipation, diarrhea, nausea and vomiting. Genitourinary: Negative. Musculoskeletal: Negative. Negative for back pain. Skin: Negative. Negative for pallor and rash. Neurological: Positive for dizziness and light-headedness. Negative for seizures, syncope, facial asymmetry, speech difficulty, weakness, numbness and headaches. Psychiatric/Behavioral: Negative. Physical Exam  
Physical Exam  
Constitutional: He is oriented to person, place, and time.  He appears well-developed and well-nourished. No distress. HENT:  
Head: Normocephalic and atraumatic. Right Ear: Hearing and external ear normal.  
Left Ear: Hearing and external ear normal.  
Nose: Nose normal.  
Mouth/Throat: Oropharynx is clear and moist. No oropharyngeal exudate. Eyes: Conjunctivae and EOM are normal. Pupils are equal, round, and reactive to light. Neck: Normal range of motion. Cardiovascular: Normal rate, regular rhythm, normal heart sounds and intact distal pulses. Pulmonary/Chest: Effort normal and breath sounds normal. No accessory muscle usage. No respiratory distress. He has no wheezes. He has no rales. He exhibits no tenderness. Abdominal: Soft. There is no tenderness. There is no rebound and no guarding. Musculoskeletal: Normal range of motion. Neurological: He is alert and oriented to person, place, and time. He has normal strength. He is not disoriented. No cranial nerve deficit or sensory deficit. He exhibits normal muscle tone. GCS eye subscore is 4. GCS verbal subscore is 5. GCS motor subscore is 6. Skin: Skin is warm, dry and intact. He is not diaphoretic. No pallor. Psychiatric: He has a normal mood and affect. His speech is normal and behavior is normal. Judgment and thought content normal. Cognition and memory are normal.  
Pt slightly tearful at end of exam when discussing his fiance. Nursing note and vitals reviewed. Diagnostic Study Results Labs - Recent Results (from the past 12 hour(s)) GLUCOSE, POC Collection Time: 08/28/18 12:28 PM  
Result Value Ref Range Glucose (POC) 133 (H) 65 - 100 mg/dL Performed by Shavon Daniel EKG, 12 LEAD, INITIAL Collection Time: 08/28/18 12:48 PM  
Result Value Ref Range Ventricular Rate 67 BPM  
 Atrial Rate 67 BPM  
 P-R Interval 150 ms QRS Duration 86 ms  
 Q-T Interval 410 ms QTC Calculation (Bezet) 433 ms Calculated P Axis 37 degrees Calculated R Axis 3 degrees Calculated T Axis 36 degrees Diagnosis Normal sinus rhythm Normal ECG No previous ECGs available POC TROPONIN-I Collection Time: 08/28/18 12:58 PM  
Result Value Ref Range Troponin-I (POC) <0.04 0.00 - 0.08 ng/mL POC CHEM8 Collection Time: 08/28/18  1:01 PM  
Result Value Ref Range Calcium, ionized (POC) 1.16 1.12 - 1.32 mmol/L Sodium (POC) 143 136 - 145 mmol/L Potassium (POC) 3.8 3.5 - 5.1 mmol/L Chloride (POC) 104 98 - 107 mmol/L  
 CO2 (POC) 26 21 - 32 mmol/L Anion gap (POC) 18 10 - 20 mmol/L Glucose (POC) 134 (H) 65 - 100 mg/dL BUN (POC) 21 (H) 9 - 20 mg/dL Creatinine (POC) 0.9 0.6 - 1.3 mg/dL GFRAA, POC >60 >60 ml/min/1.73m2 GFRNA, POC >60 >60 ml/min/1.73m2 Hematocrit (POC) 41 36.6 - 50.3 % Comment Comment Not Indicated. Radiologic Studies - No orders to display CT Results  (Last 48 hours) None CXR Results  (Last 48 hours) None Medical Decision Making I am the first provider for this patient. I reviewed the vital signs, available nursing notes, past medical history, past surgical history, family history and social history. Vital Signs-Reviewed the patient's vital signs. Patient Vitals for the past 12 hrs: 
 Temp Pulse Resp BP SpO2  
08/28/18 1248 - 74 - 136/74 -  
08/28/18 1247 - 71 - 141/70 -  
08/28/18 1246 - 66 - 144/73 -  
08/28/18 1224 97.8 °F (36.6 °C) 72 18 144/81 99 % Pulse Oximetry Analysis - 99% on RA 
 
EKG interpretation: (Preliminary) Rhythm: normal sinus rhythm; and regular . Rate (approx.): 67; Axis: normal; IN interval: normal; QRS interval: normal ; ST/T wave: normal; Other findings: normal. 
 
Records Reviewed: Nursing Notes, Old Medical Records, Previous Radiology Studies and Previous Laboratory Studies Provider Notes (Medical Decision Making): DDx: hypo/hyperglycemia, dehydration, electrolyte abnormality, orthostatic/ vasovagal, acs, arrhythmia ED Course: Initial assessment performed. The patients presenting problems have been discussed, and they are in agreement with the care plan formulated and outlined with them. I have encouraged them to ask questions as they arise throughout their visit. 1:37 PM 
Pt endorses he is feeling better after oral hydration and snacks. Updated on EKG and lab results. Plan to discharge. Critical Care Time:  
0 Disposition: 
1:37 PM 
I have discussed with patient their diagnosis, treatment, and follow up plan. The patient agrees to follow up as outlined in discharge paperwork and also to return to the ED with any worsening. Sanjeev Cool PA-C 
 
PLAN: 
1. Current Discharge Medication List  
  
CONTINUE these medications which have NOT CHANGED Details  
metFORMIN (GLUCOPHAGE) 1,000 mg tablet Take 1 Tab by mouth two (2) times daily (with meals). Qty: 180 Tab, Refills: 3  
  
glucose blood VI test strips (BLOOD GLUCOSE TEST) strip Check BG 1-2 times/day Qty: 100 Strip, Refills: 11 Blood-Glucose Meter (RELION ALL-IN-ONE METER) monitoring kit Check BG 1-2 times/day Qty: 1 Kit, Refills: 0 DIPHENHYDRAMINE HCL/CALAMINE (IVAREST EX) by Apply Externally route. glimepiride (AMARYL) 2 mg tablet Take 1 Tab by mouth every morning. Qty: 90 Tab, Refills: 3 Comments: To replace glipizide SILDENAFIL CITRATE (VIAGRA PO) Take  by mouth. 2.  
Follow-up Information Follow up With Details Comments Contact Info [facility] [place] Schedule an appointment as soon as possible for a visit in 3 days As needed, If symptoms worsen [id number] [place] [address] [phone] Return to ED if worse Diagnosis Clinical Impression: 1. Dehydration Attestations:

## 2018-08-28 NOTE — DISCHARGE INSTRUCTIONS
Dehydration: Care Instructions  Your Care Instructions  Dehydration happens when your body loses too much fluid. This might happen when you do not drink enough water or you lose large amounts of fluids from your body because of diarrhea, vomiting, or sweating. Severe dehydration can be life-threatening. Water and minerals called electrolytes help put your body fluids back in balance. Learn the early signs of fluid loss, and drink more fluids to prevent dehydration. Follow-up care is a key part of your treatment and safety. Be sure to make and go to all appointments, and call your doctor if you are having problems. It's also a good idea to know your test results and keep a list of the medicines you take. How can you care for yourself at home? · To prevent dehydration, drink plenty of fluids, enough so that your urine is light yellow or clear like water. Choose water and other caffeine-free clear liquids until you feel better. If you have kidney, heart, or liver disease and have to limit fluids, talk with your doctor before you increase the amount of fluids you drink. · If you do not feel like eating or drinking, try taking small sips of water, sports drinks, or other rehydration drinks. · Get plenty of rest.  To prevent dehydration  · Add more fluids to your diet and daily routine, unless your doctor has told you not to. · During hot weather, drink more fluids. Drink even more fluids if you exercise a lot. Stay away from drinks with alcohol or caffeine. · Watch for the symptoms of dehydration. These include:  ¨ A dry, sticky mouth. ¨ Dark yellow urine, and not much of it. ¨ Dry and sunken eyes. ¨ Feeling very tired. · Learn what problems can lead to dehydration. These include:  ¨ Diarrhea, fever, and vomiting. ¨ Any illness with a fever, such as pneumonia or the flu. ¨ Activities that cause heavy sweating, such as endurance races and heavy outdoor work in hot or humid weather.   ¨ Alcohol or drug abuse or withdrawal.  ¨ Certain medicines, such as cold and allergy pills (antihistamines), diet pills (diuretics), and laxatives. ¨ Certain diseases, such as diabetes, cancer, and heart or kidney disease. When should you call for help? Call 911 anytime you think you may need emergency care. For example, call if:    · You passed out (lost consciousness).    Call your doctor now or seek immediate medical care if:    · You are confused and cannot think clearly.     · You are dizzy or lightheaded, or you feel like you may faint.     · You have signs of needing more fluids. You have sunken eyes and a dry mouth, and you pass only a little dark urine.     · You cannot keep fluids down.    Watch closely for changes in your health, and be sure to contact your doctor if:    · You are not making tears.     · Your skin is very dry and sags slowly back into place after you pinch it.     · Your mouth and eyes are very dry. Where can you learn more? Go to http://ajit-david.info/. Enter C385 in the search box to learn more about \"Dehydration: Care Instructions. \"  Current as of: November 20, 2017  Content Version: 11.7  © 5801-6850 [employer] Care instructions adapted under license by [employer] (which disclaims liability or warranty for this information). If you have questions about a medical condition or this instruction, always ask your healthcare professional. [place] [address] any warranty or liability for your use of this information.

## 2018-08-28 NOTE — ED NOTES
Pt presents to the ED with c/o an episode of syncope earlier today. Pt thinks he is stressed and his blood sugar is giving him issues. Pt denies n/v/d. Pt reports chills. Pt reports a headache. Pt reports only drinking a cup of coffee today. Pt is alert and oriented. Pt skin is warm and dry. Pt is ambulatory independently. Emergency Department Nursing Plan of Care The Nursing Plan of Care is developed from the Nursing assessment and Emergency Department Attending provider initial evaluation. The plan of care may be reviewed in the ED Provider note. The Plan of Care was developed with the following considerations:  
Patient / Family readiness to learn indicated by:verbalized understanding Persons(s) to be included in education: patient Barriers to Learning/Limitations:No 
 
Signed Nelly Momin 8/28/2018   12:37 PM 
 
 unstable

## 2018-09-10 ENCOUNTER — HOSPITAL ENCOUNTER (EMERGENCY)
Age: 58
Discharge: HOME | End: 2018-09-10
Attending: EMERGENCY MEDICINE
Payer: SELF-PAY

## 2018-09-10 VITALS
BODY MASS INDEX: 24.92 KG/M2 | HEART RATE: 98 BPM | TEMPERATURE: 98.2 F | OXYGEN SATURATION: 97 % | RESPIRATION RATE: 18 BRPM | DIASTOLIC BLOOD PRESSURE: 81 MMHG | SYSTOLIC BLOOD PRESSURE: 149 MMHG | HEIGHT: 73 IN | WEIGHT: 188 LBS

## 2018-09-10 DIAGNOSIS — L25.9 CONTACT DERMATITIS, UNSPECIFIED CONTACT DERMATITIS TYPE, UNSPECIFIED TRIGGER: Primary | ICD-10-CM

## 2018-09-10 DIAGNOSIS — R73.9 HYPERGLYCEMIA: ICD-10-CM

## 2018-09-10 LAB
GLUCOSE BLD STRIP.AUTO-MCNC: 200 MG/DL (ref 65–100)
GLUCOSE BLD STRIP.AUTO-MCNC: 213 MG/DL (ref 65–100)
SERVICE CMNT-IMP: ABNORMAL
SERVICE CMNT-IMP: ABNORMAL

## 2018-09-10 PROCEDURE — 74011636637 HC RX REV CODE- 636/637: Performed by: PHYSICIAN ASSISTANT

## 2018-09-10 PROCEDURE — 99284 EMERGENCY DEPT VISIT MOD MDM: CPT

## 2018-09-10 PROCEDURE — 74011250637 HC RX REV CODE- 250/637: Performed by: PHYSICIAN ASSISTANT

## 2018-09-10 PROCEDURE — 82962 GLUCOSE BLOOD TEST: CPT

## 2018-09-10 RX ORDER — PREDNISONE 20 MG/1
20 TABLET ORAL DAILY
Qty: 5 TAB | Refills: 0 | Status: SHIPPED | OUTPATIENT
Start: 2018-09-10 | End: 2018-09-15

## 2018-09-10 RX ORDER — PREDNISONE 20 MG/1
20 TABLET ORAL
Status: COMPLETED | OUTPATIENT
Start: 2018-09-10 | End: 2018-09-10

## 2018-09-10 RX ORDER — TRIAMCINOLONE ACETONIDE 5 MG/G
CREAM TOPICAL 2 TIMES DAILY
Qty: 15 G | Refills: 0 | Status: SHIPPED | OUTPATIENT
Start: 2018-09-10 | End: 2018-09-15

## 2018-09-10 RX ORDER — DIPHENHYDRAMINE HCL 25 MG
25 CAPSULE ORAL
Status: COMPLETED | OUTPATIENT
Start: 2018-09-10 | End: 2018-09-10

## 2018-09-10 RX ADMIN — DIPHENHYDRAMINE HYDROCHLORIDE 25 MG: 25 CAPSULE ORAL at 19:04

## 2018-09-10 RX ADMIN — PREDNISONE 20 MG: 20 TABLET ORAL at 19:04

## 2018-09-10 NOTE — ED PROVIDER NOTES
EMERGENCY DEPARTMENT HISTORY AND PHYSICAL EXAM 
 
Date: 9/10/2018 Patient Name: Steffi Wilkins History of Presenting Illness Chief Complaint Patient presents with  
 High Blood Sugar Pt co high blood sugar. Pt states at 11am his BG was 250 but took his metformin at 7am. Pt requesting BG check. Pt c.o feeling dizzy.  Skin Problem Pt presenst with red bumps on R and L arm after powerwashing a house on Thursday. HPI: Steffi Wilkins is a 59 y.o. male with a PMHx of DM2, eczema, presents to the ED for rash on both of his forearms since yesterday. Pt reports he was power washing a fence that had weeds on it and that the water was splashing back on him. He is unsure if this caused him to get poison ivy or if it exacerbated his eczema. He says the itching has progressively worsened and now it is severe. He denies any new detergents/clothes/soaps/etc. He denies fever/chills, wound drainage, among other assoc sx's. PCP: PROVIDER UNKNOWN Current Outpatient Prescriptions Medication Sig Dispense Refill  predniSONE (DELTASONE) 20 mg tablet Take 1 Tab by mouth daily for 5 days. With Breakfast 5 Tab 0  
 triamcinolone (ARISTOCORT) 0.5 % topical cream Apply  to affected area two (2) times a day for 5 days. use thin layer 15 g 0  
 glimepiride (AMARYL) 2 mg tablet Take 1 Tab by mouth every morning. 90 Tab 3  
 metFORMIN (GLUCOPHAGE) 1,000 mg tablet Take 1 Tab by mouth two (2) times daily (with meals). 180 Tab 3  
 glucose blood VI test strips (BLOOD GLUCOSE TEST) strip Check BG 1-2 times/day 100 Strip 11  Blood-Glucose Meter (RELION ALL-IN-ONE METER) monitoring kit Check BG 1-2 times/day 1 Kit 0  
 DIPHENHYDRAMINE HCL/CALAMINE (IVAREST EX) by Apply Externally route.  SILDENAFIL CITRATE (VIAGRA PO) Take  by mouth. Past History Past Medical History: 
Past Medical History:  
Diagnosis Date  Diabetes ([address]  Diabetes mellitus ([address] 7/11/2012  Eczema 7/11/2012 Past Surgical History: 
Past Surgical History:  
Procedure Laterality Date  HX ORTHOPAEDIC    
 L knee scope Family History: 
Family History Problem Relation Age of Onset  Hypertension Mother [facility] Arthritis-osteo Mother Social History: 
Social History Substance Use Topics  Smoking status: Current Every Day Smoker Packs/day: 1.00  Smokeless tobacco: Current User  Alcohol use 5.0 oz/week 5 Cans of beer, 5 Standard drinks or equivalent per week Allergies: Allergies Allergen Reactions  Doxycycline Itching  Tramadol Itching  Pcn [Penicillins] Hives Review of Systems Review of Systems Constitutional: Negative for chills, fever and unexpected weight change. Respiratory: Negative for shortness of breath. Cardiovascular: Negative for chest pain. Gastrointestinal: Negative for nausea and vomiting. Endocrine: Negative for polydipsia, polyphagia and polyuria. Musculoskeletal: Negative for arthralgias and myalgias. Skin: Positive for rash. Neurological: Negative for light-headedness and headaches. All other systems reviewed and are negative. Physical Exam  
 
Vitals:  
 09/10/18 1758 BP: 149/81 Pulse: 98 Resp: 18 Temp: 98.2 °F (36.8 °C) SpO2: 97% Weight: 85.3 kg (188 lb) Height: 6' 1\" (1.854 m) Physical Exam  
Constitutional: He is oriented to person, place, and time. He appears well-developed and well-nourished. No distress. HENT:  
Head: Normocephalic and atraumatic. Cardiovascular: Normal rate, regular rhythm and normal heart sounds. Pulmonary/Chest: Effort normal and breath sounds normal.  
Abdominal: Soft. Bowel sounds are normal. He exhibits no distension and no mass. There is no tenderness. There is no rebound and no guarding. Neurological: He is alert and oriented to person, place, and time. Skin: Skin is warm and dry. He is not diaphoretic. Moderate/severe Contact dermatitis on forearms bilaterally Psychiatric: He has a normal mood and affect. His behavior is normal. Judgment and thought content normal.  
 
 
 
Diagnostic Study Results Labs - Recent Results (from the past 12 hour(s)) GLUCOSE, POC Collection Time: 09/10/18  6:15 PM  
Result Value Ref Range Glucose (POC) 200 (H) 65 - 100 mg/dL Performed by Dia Barger, POC Collection Time: 09/10/18  6:27 PM  
Result Value Ref Range Glucose (POC) 213 (H) 65 - 100 mg/dL Performed by Shobha Corea Radiologic Studies - No orders to display CT Results  (Last 48 hours) None CXR Results  (Last 48 hours) None Medical Decision Making I am the first provider for this patient. I reviewed the vital signs, available nursing notes, past medical history, past surgical history, family history and social history. Vital Signs-Reviewed the patient's vital signs. Records Reviewed: Nursing Notes and Old Medical Records ED Course:  
Initial assessment performed. The patients presenting problems have been discussed, and they are in agreement with the care plan formulated and outlined with them. I have encouraged them to ask questions as they arise throughout their visit. Available labs, imaging, and vital signs reviewed and read in full detail Vitals:  
 09/10/18 1758 BP: 149/81 BP 1 Location: Left arm BP Patient Position: At rest  
Pulse: 98 Resp: 18 Temp: 98.2 °F (36.8 °C) SpO2: 97% Weight: 85.3 kg (188 lb) Height: 6' 1\" (1.854 m) On re evaluation pt is resting comfortably and is requesting discharge. Disposition: D/c home DISCHARGE NOTE: The patient has been re-evaluated and is ready for discharge. Patient has no new complaints, changes, or physical findings. I Counseled the patient on diagnosis and care plan.  All available lab and imaging results have been reviewed by me and were discussed with the patient, including all incidental findings. The likelihood of other entities in the differential is insufficient to justify any further testing for them. This was explained to the patient. Patient agrees with plan and agrees to follow up with  as PCP recommended, or return to the ED if their symptoms worsen. All medications were reviewed with the patient; will d/c home with prednisone and triamcinolone cream. All of pt's questions and concerns were addressed. The patient was advised that new or worsening symptoms would require further evaluation and should prompt immediate return to the Emergency Department. Discharge instructions have been provided and explained to the patient, along with reasons to return to the ED. Patient voices understanding and is agreeable with the plan for discharge. Patient is ready to go home. Follow-up Information Follow up With Details Comments Contact Info [facility] [place] Schedule an appointment as soon as possible for a visit  [address] [place] [phone] Current Discharge Medication List  
  
START taking these medications Details  
predniSONE (DELTASONE) 20 mg tablet Take 1 Tab by mouth daily for 5 days. With Breakfast 
Qty: 5 Tab, Refills: 0  
  
triamcinolone (ARISTOCORT) 0.5 % topical cream Apply  to affected area two (2) times a day for 5 days. use thin layer Qty: 15 g, Refills: 0 Provider Notes (Medical Decision Making): DDx: poison ivy/oak dermatitis, contact dermatitis from topical antimicrobials/antihistimines/anesthetics/hair dyes/preservatives/latex, irritant contact dermatitis from soaps/detergents/organic solvents, impetigo, scabies, dermatophytid reaction, atopic dermatitis Likely poison ivy/oak or eczema given pts hx, physical exam, and workup today. Procedures: 
Procedures Diagnosis Clinical Impression: 1. Contact dermatitis, unspecified contact dermatitis type, unspecified trigger

## 2018-09-10 NOTE — ED NOTES
Discharge and medication instructions reviewed with patient. Patient able to verbalize events which would require immediate follow up

## 2018-09-10 NOTE — DISCHARGE INSTRUCTIONS
Learning About High Blood Sugar  What is high blood sugar? Your body turns the food you eat into glucose (sugar), which it uses for energy. But if your body isn't able to use the sugar right away, it can build up in your blood and lead to high blood sugar. When the amount of sugar in your blood stays too high for too much of the time, you may have diabetes. Diabetes is a disease that can cause serious health problems. The good news is that lifestyle changes may help you get your blood sugar back to normal and avoid or delay diabetes. What causes high blood sugar? Sugar (glucose) can build up in your blood if you:  · Are overweight. · Have a family history of diabetes. · Take certain medicines, such as steroids. What are the symptoms? Having high blood sugar may not cause any symptoms at all. Or it may make you feel very thirsty or very hungry. You may also urinate more often than usual, have blurry vision, or lose weight without trying. How is high blood sugar treated? You can take steps to lower your blood sugar level if you understand what makes it get higher. Your doctor may want you to learn how to test your blood sugar level at home. Then you can see how illness, stress, or different kinds of food or medicine raise or lower your blood sugar level. Other tests may be needed to see if you have diabetes. How can you prevent high blood sugar? · Watch your weight. If you're overweight, losing just a small amount of weight may help. Reducing fat around your waist is most important. · Limit the amount of calories, sweets, and unhealthy fat you eat. Ask your doctor if a dietitian can help you. A registered dietitian can help you create meal plans that fit your lifestyle. · Get at least 30 minutes of exercise on most days of the week. Exercise helps control your blood sugar. It also helps you maintain a healthy weight. Walking is a good choice.  You also may want to do other activities, such as running, swimming, cycling, or playing tennis or team sports. · If your doctor prescribed medicines, take them exactly as prescribed. Call your doctor if you think you are having a problem with your medicine. You will get more details on the specific medicines your doctor prescribes. Follow-up care is a key part of your treatment and safety. Be sure to make and go to all appointments, and call your doctor if you are having problems. It's also a good idea to know your test results and keep a list of the medicines you take. Where can you learn more? Go to http://ajitThinkgluedavid.info/. Enter O108 in the search box to learn more about \"Learning About High Blood Sugar. \"  Current as of: December 7, 2017  Content Version: 11.7  © [id number] [employer]. Care instructions adapted under license by [employer] (which disclaims liability or warranty for this information). If you have questions about a medical condition or this instruction, always ask your healthcare professional. [address] any warranty or liability for your use of this information. Dermatitis: Care Instructions  Your Care Instructions  Dermatitis is the general name used for any rash or inflammation of the skin. Different kinds of dermatitis cause different kinds of rashes. Common causes of a rash include new medicines, plants (such as poison oak or poison ivy), heat, and stress. Certain illnesses can also cause a rash. An allergic reaction to something that touches your skin, such as latex, nickel, or poison ivy, is called contact dermatitis. Contact dermatitis may also be caused by something that irritates the skin, such as bleach, a chemical, or soap. These types of rashes cannot be spread from person to person. How long your rash will last depends on what caused it. Rashes may last a few days or months. Follow-up care is a key part of your treatment and safety.  Be sure to make and go to all appointments, and call your doctor if you are having problems. It's also a good idea to know your test results and keep a list of the medicines you take. How can you care for yourself at home? · Do not scratch the rash. Cut your nails short, and file them smooth. Or wear gloves if this helps keep you from scratching. · Wash the area with water only. Pat dry. · Put cold, wet cloths on the rash to reduce itching. · Keep cool, and stay out of the sun. · Leave the rash open to the air as much as possible. · If the rash itches, use hydrocortisone cream. Follow the directions on the label. Calamine lotion may help for plant rashes. · Take an over-the-counter antihistamine, such as diphenhydramine (Benadryl) or loratadine (Claritin), to help calm the itching. Read and follow all instructions on the label. · If your doctor prescribed a cream, use it as directed. If your doctor prescribed medicine, take it exactly as directed. When should you call for help? Call your doctor now or seek immediate medical care if:    · You have symptoms of infection, such as:  ¨ Increased pain, swelling, warmth, or redness. ¨ Red streaks leading from the area. ¨ Pus draining from the area. ¨ A fever.     · You have joint pain along with the rash.    Watch closely for changes in your health, and be sure to contact your doctor if:    · Your rash is changing or getting worse.     · You are not getting better as expected. Where can you learn more? Go to http://ajit-david.info/. Enter [id number] in the search box to learn more about \"Dermatitis: Care Instructions. \"  Current as of: October 5, 2017  Content Version: 11.7  © 5623-2457 [employer]. Care instructions adapted under license by [employer] (which disclaims liability or warranty for this information).  If you have questions about a medical condition or this instruction, always ask your healthcare professional. [employer] disclaims any warranty or liability for your use of this information. Poison [place], [place], and [place]: Care Instructions  Your Care Instructions    Poison ivy, poison oak, and poison sumac are plants that can cause a skin rash upon contact. The red, itchy rash often shows up in lines or streaks and may cause fluid-filled blisters or large, raised hives. The rash is caused by an allergic reaction to an oil in poison ivy, oak, and sumac. The rash may occur when you touch the plant or when you touch clothing, pet fur, sporting gear, gardening tools, or other objects that have come in contact with one of these plants. You cannot catch or spread the rash, even if you touch it or the blister fluid, because the plant oil will already have been absorbed or washed off the skin. The rash may seem to be spreading, but either it is still developing from earlier contact or you have touched something that still has the plant oil on it. Follow-up care is a key part of your treatment and safety. Be sure to make and go to all appointments, and call your doctor if you are having problems. It's also a good idea to know your test results and keep a list of the medicines you take. How can you care for yourself at home? · If your doctor prescribed a cream, use it as directed. If your doctor prescribed medicine, take it exactly as prescribed. Call your doctor if you think you are having a problem with your medicine. · Use cold, wet cloths to reduce itching. · Keep cool, and stay out of the sun. · Leave the rash open to the air. · Wash all clothing or other things that may have come in contact with the plant oil. · Avoid most lotions and ointments until the rash heals. Calamine lotion may help relieve symptoms of a plant rash. Use it 3 or 4 times a day.   To prevent poison ivy exposure  If you know that you will be near poison ivy, oak, or sumac, you can try these options:  · Use a product designed to help prevent plant oil from getting on the skin. These products, such as Ivy X Pre-Contact Skin Solution, come in lotions, sprays, or towelettes. You put the product on your skin right before you go outdoors. · If you did not use a preventive product and you have had contact with plant oil, clean it off your skin as soon as possible. Use a product such as [employer] Original Outdoor Skin Cleanser. These products can also be used to clean plant oil from clothing or tools. When should you call for help? Call your doctor now or seek immediate medical care if:    · Your rash gets worse, and you start to feel bad and have a fever, a stiff neck, nausea, and vomiting.     · You have signs of infection, such as:  ¨ Increased pain, swelling, warmth, or redness. ¨ Red streaks leading from the rash. ¨ Pus draining from the rash. ¨ A fever.    Watch closely for changes in your health, and be sure to contact your doctor if:    · You have new blisters or bruises, or the rash spreads and looks like a sunburn.     · The rash gets worse, or it comes back after nearly disappearing.     · You think a medicine you are using is making your rash worse.     · Your rash does not clear up after 1 to 2 weeks of home treatment.     · You have joint aches or body aches with your rash. Where can you learn more? Go to http://ajit-david.info/. Enter Z050 in the search box to learn more about \"Poison [place], [place], and [place]: Care Instructions. \"  Current as of: October 5, 2017  Content Version: 11.7  © 4243-5948 [employer]. Care instructions adapted under license by [employer] (which disclaims liability or warranty for this information). If you have questions about a medical condition or this instruction, always ask your healthcare professional. [address] any warranty or liability for your use of this information.

## 2018-10-16 ENCOUNTER — HOSPITAL ENCOUNTER (EMERGENCY)
Age: 58
Discharge: HOME | End: 2018-10-16
Attending: EMERGENCY MEDICINE
Payer: SELF-PAY

## 2018-10-16 VITALS
SYSTOLIC BLOOD PRESSURE: 134 MMHG | HEART RATE: 75 BPM | OXYGEN SATURATION: 98 % | TEMPERATURE: 98.4 F | BODY MASS INDEX: 24.65 KG/M2 | HEIGHT: 72 IN | DIASTOLIC BLOOD PRESSURE: 69 MMHG | WEIGHT: 182 LBS | RESPIRATION RATE: 16 BRPM

## 2018-10-16 DIAGNOSIS — R73.9 HYPERGLYCEMIA: Primary | ICD-10-CM

## 2018-10-16 DIAGNOSIS — J02.9 VIRAL PHARYNGITIS: ICD-10-CM

## 2018-10-16 LAB
ANION GAP SERPL CALC-SCNC: 7 MMOL/L (ref 5–15)
BASOPHILS # BLD: 0 K/UL (ref 0–0.1)
BASOPHILS NFR BLD: 0 % (ref 0–1)
BUN SERPL-MCNC: 16 MG/DL (ref 6–20)
BUN/CREAT SERPL: 15 (ref 12–20)
CALCIUM SERPL-MCNC: 8.9 MG/DL (ref 8.5–10.1)
CHLORIDE SERPL-SCNC: 106 MMOL/L (ref 97–108)
CO2 SERPL-SCNC: 28 MMOL/L (ref 21–32)
CREAT SERPL-MCNC: 1.04 MG/DL (ref 0.7–1.3)
DEPRECATED S PYO AG THROAT QL EIA: NEGATIVE
DIFFERENTIAL METHOD BLD: NORMAL
EOSINOPHIL # BLD: 0 K/UL (ref 0–0.4)
EOSINOPHIL NFR BLD: 1 % (ref 0–7)
ERYTHROCYTE [DISTWIDTH] IN BLOOD BY AUTOMATED COUNT: 13.2 % (ref 11.5–14.5)
GLUCOSE BLD STRIP.AUTO-MCNC: 225 MG/DL (ref 65–100)
GLUCOSE SERPL-MCNC: 202 MG/DL (ref 65–100)
HCT VFR BLD AUTO: 39 % (ref 36.6–50.3)
HGB BLD-MCNC: 12.5 G/DL (ref 12.1–17)
IMM GRANULOCYTES # BLD: 0 K/UL (ref 0–0.04)
IMM GRANULOCYTES NFR BLD AUTO: 0 % (ref 0–0.5)
LYMPHOCYTES # BLD: 1.4 K/UL (ref 0.8–3.5)
LYMPHOCYTES NFR BLD: 23 % (ref 12–49)
MCH RBC QN AUTO: 27.1 PG (ref 26–34)
MCHC RBC AUTO-ENTMCNC: 32.1 G/DL (ref 30–36.5)
MCV RBC AUTO: 84.4 FL (ref 80–99)
MONOCYTES # BLD: 0.6 K/UL (ref 0–1)
MONOCYTES NFR BLD: 10 % (ref 5–13)
NEUTS SEG # BLD: 4 K/UL (ref 1.8–8)
NEUTS SEG NFR BLD: 66 % (ref 32–75)
NRBC # BLD: 0 K/UL (ref 0–0.01)
NRBC BLD-RTO: 0 PER 100 WBC
PLATELET # BLD AUTO: 174 K/UL (ref 150–400)
PMV BLD AUTO: 9.8 FL (ref 8.9–12.9)
POTASSIUM SERPL-SCNC: 3.8 MMOL/L (ref 3.5–5.1)
RBC # BLD AUTO: 4.62 M/UL (ref 4.1–5.7)
SERVICE CMNT-IMP: ABNORMAL
SODIUM SERPL-SCNC: 141 MMOL/L (ref 136–145)
WBC # BLD AUTO: 6 K/UL (ref 4.1–11.1)

## 2018-10-16 PROCEDURE — 82962 GLUCOSE BLOOD TEST: CPT

## 2018-10-16 PROCEDURE — 99283 EMERGENCY DEPT VISIT LOW MDM: CPT

## 2018-10-16 PROCEDURE — 87070 CULTURE OTHR SPECIMN AEROBIC: CPT | Performed by: EMERGENCY MEDICINE

## 2018-10-16 PROCEDURE — 74011250637 HC RX REV CODE- 250/637: Performed by: EMERGENCY MEDICINE

## 2018-10-16 PROCEDURE — 85025 COMPLETE CBC W/AUTO DIFF WBC: CPT | Performed by: EMERGENCY MEDICINE

## 2018-10-16 PROCEDURE — 36415 COLL VENOUS BLD VENIPUNCTURE: CPT | Performed by: EMERGENCY MEDICINE

## 2018-10-16 PROCEDURE — 87880 STREP A ASSAY W/OPTIC: CPT | Performed by: EMERGENCY MEDICINE

## 2018-10-16 PROCEDURE — 80048 BASIC METABOLIC PNL TOTAL CA: CPT | Performed by: EMERGENCY MEDICINE

## 2018-10-16 PROCEDURE — 74011000250 HC RX REV CODE- 250: Performed by: EMERGENCY MEDICINE

## 2018-10-16 RX ORDER — IBUPROFEN 800 MG/1
800 TABLET ORAL
Qty: 15 TAB | Refills: 0 | Status: SHIPPED | OUTPATIENT
Start: 2018-10-16 | End: 2019-05-09 | Stop reason: ALTCHOICE

## 2018-10-16 RX ORDER — LIDOCAINE HYDROCHLORIDE 20 MG/ML
10 SOLUTION OROPHARYNGEAL
Status: COMPLETED | OUTPATIENT
Start: 2018-10-16 | End: 2018-10-16

## 2018-10-16 RX ORDER — IBUPROFEN 400 MG/1
800 TABLET ORAL
Status: COMPLETED | OUTPATIENT
Start: 2018-10-16 | End: 2018-10-16

## 2018-10-16 RX ORDER — LIDOCAINE HYDROCHLORIDE 20 MG/ML
10 SOLUTION OROPHARYNGEAL AS NEEDED
Qty: 1 BOTTLE | Refills: 0 | Status: SHIPPED | OUTPATIENT
Start: 2018-10-16 | End: 2019-05-09 | Stop reason: ALTCHOICE

## 2018-10-16 RX ADMIN — IBUPROFEN 800 MG: 400 TABLET ORAL at 19:05

## 2018-10-16 RX ADMIN — LIDOCAINE HYDROCHLORIDE 10 ML: 20 SOLUTION ORAL; TOPICAL at 19:04

## 2018-10-16 NOTE — DISCHARGE INSTRUCTIONS
Learning About High Blood Sugar  What is high blood sugar? Your body turns the food you eat into glucose (sugar), which it uses for energy. But if your body isn't able to use the sugar right away, it can build up in your blood and lead to high blood sugar. When the amount of sugar in your blood stays too high for too much of the time, you may have diabetes. Diabetes is a disease that can cause serious health problems. The good news is that lifestyle changes may help you get your blood sugar back to normal and avoid or delay diabetes. What causes high blood sugar? Sugar (glucose) can build up in your blood if you:  · Are overweight. · Have a family history of diabetes. · Take certain medicines, such as steroids. What are the symptoms? Having high blood sugar may not cause any symptoms at all. Or it may make you feel very thirsty or very hungry. You may also urinate more often than usual, have blurry vision, or lose weight without trying. How is high blood sugar treated? You can take steps to lower your blood sugar level if you understand what makes it get higher. Your doctor may want you to learn how to test your blood sugar level at home. Then you can see how illness, stress, or different kinds of food or medicine raise or lower your blood sugar level. Other tests may be needed to see if you have diabetes. How can you prevent high blood sugar? · Watch your weight. If you're overweight, losing just a small amount of weight may help. Reducing fat around your waist is most important. · Limit the amount of calories, sweets, and unhealthy fat you eat. Ask your doctor if a dietitian can help you. A registered dietitian can help you create meal plans that fit your lifestyle. · Get at least 30 minutes of exercise on most days of the week. Exercise helps control your blood sugar. It also helps you maintain a healthy weight. Walking is a good choice.  You also may want to do other activities, such as running, swimming, cycling, or playing tennis or team sports. · If your doctor prescribed medicines, take them exactly as prescribed. Call your doctor if you think you are having a problem with your medicine. You will get more details on the specific medicines your doctor prescribes. Follow-up care is a key part of your treatment and safety. Be sure to make and go to all appointments, and call your doctor if you are having problems. It's also a good idea to know your test results and keep a list of the medicines you take. Where can you learn more? Go to http://ajit-david.info/. Enter O108 in the search box to learn more about \"Learning About High Blood Sugar. \"  Current as of: December 7, 2017  Content Version: 11.8  © 7341-4842 [employer]. Care instructions adapted under license by [employer] (which disclaims liability or warranty for this information). If you have questions about a medical condition or this instruction, always ask your healthcare professional. [address] any warranty or liability for your use of this information.

## 2018-10-16 NOTE — ED PROVIDER NOTES
EMERGENCY DEPARTMENT HISTORY AND PHYSICAL EXAM 
 
 
Date: 10/16/2018 Patient Name: Charlene Manning History of Presenting Illness Chief Complaint Patient presents with  Sore Throat  High Blood Sugar History Provided By: Patient and Patient's Mother HPI: Charlene Manning, 59 y.o. male with PMHx significant for DM, presents ambulatory to the ED with cc of new onset, worsening, constant, R-sided sore throat since 10/13/18 that worsened starting today. The pt states that he has experienced the throat pain before in the past. He endorses using cough drops w/o relief. He denies experiencing trouble breathing, fever, cough, chills, nausea, or vomiting. He also c/o an elevated BGL at ~230 starting today. The pt states that his BGL is nl in the 120 range. He reports taking Metformin this morning. The pt specifically denies experiencing urine increase or abdominal pain. PMHx: DM 
PSHx: L knee scope SHx: + EtOH, + tobacco, - illicit drugs There are no other complaints, changes, or physical findings at this time. PCP: PROVIDER UNKNOWN Current Outpatient Prescriptions Medication Sig Dispense Refill  metFORMIN (GLUCOPHAGE) 1,000 mg tablet Take 1 Tab by mouth two (2) times daily (with meals). 180 Tab 3  
 glucose blood VI test strips (BLOOD GLUCOSE TEST) strip Check BG 1-2 times/day 100 Strip 11  Blood-Glucose Meter (RELION ALL-IN-ONE METER) monitoring kit Check BG 1-2 times/day 1 Kit 0  
 DIPHENHYDRAMINE HCL/CALAMINE (IVAREST EX) by Apply Externally route.  glimepiride (AMARYL) 2 mg tablet Take 1 Tab by mouth every morning. 90 Tab 3  
 SILDENAFIL CITRATE (VIAGRA PO) Take  by mouth. Past History Past Medical History: 
Past Medical History:  
Diagnosis Date  Diabetes ([address]  Diabetes mellitus ([address] 7/11/2012  Eczema 7/11/2012 Past Surgical History: 
Past Surgical History:  
Procedure Laterality Date  HX ORTHOPAEDIC    
 L knee scope Family History: 
Family History Problem Relation Age of Onset  Hypertension Mother Kaden Arthritis-osteo Mother Social History: 
Social History Substance Use Topics  Smoking status: Current Every Day Smoker Packs/day: 1.00  Smokeless tobacco: Current User  Alcohol use 5.0 oz/week 5 Cans of beer, 5 Standard drinks or equivalent per week Allergies: Allergies Allergen Reactions  Doxycycline Itching  Tramadol Itching  Pcn [Penicillins] Hives Review of Systems Review of Systems Constitutional: Negative for chills and fever. HENT: Positive for sore throat. Negative for congestion, rhinorrhea, sneezing and trouble swallowing. Eyes: Negative for redness and visual disturbance. Respiratory: Negative for cough and shortness of breath. Cardiovascular: Negative for chest pain and leg swelling. Gastrointestinal: Negative for abdominal pain, nausea and vomiting. Genitourinary: Negative for difficulty urinating and frequency. -urine increase Musculoskeletal: Negative for back pain, myalgias and neck stiffness. Skin: Negative for rash. Neurological: Negative for dizziness, syncope, weakness and headaches. Hematological: Negative for adenopathy. All other systems reviewed and are negative. Physical Exam  
Physical Exam  
Constitutional: He is oriented to person, place, and time. He appears well-developed and well-nourished. HENT:  
Head: Normocephalic and atraumatic. Nose: Nose normal.  
Mouth/Throat: Mucous membranes are normal. No oropharyngeal exudate. Mild pharyngeal erythema bilat, no tonsillar hypertrophy or exudate. No abscess. Eyes: EOM are normal.  
Neck: Normal range of motion and full passive range of motion without pain. Neck supple. Cardiovascular: Normal rate, regular rhythm, normal heart sounds, intact distal pulses and normal pulses. No murmur heard. Pulmonary/Chest: Effort normal and breath sounds normal. No respiratory distress. He exhibits no tenderness. Abdominal: Soft. Normal appearance. There is no tenderness. There is no rebound and no guarding. Musculoskeletal: Normal range of motion. Lymphadenopathy:  
  He has cervical adenopathy (R anterior, ttp). Neurological: He is alert and oriented to person, place, and time. He has normal strength. Skin: Skin is warm, dry and intact. No rash noted. No erythema. Psychiatric: He has a normal mood and affect. His speech is normal and behavior is normal. Judgment and thought content normal.  
Nursing note and vitals reviewed. Diagnostic Study Results Labs - Recent Results (from the past 12 hour(s)) GLUCOSE, POC Collection Time: 10/16/18  6:13 PM  
Result Value Ref Range Glucose (POC) 225 (H) 65 - 100 mg/dL Performed by Julianna Samuels (Tech) CBC WITH AUTOMATED DIFF Collection Time: 10/16/18  7:01 PM  
Result Value Ref Range WBC 6.0 4.1 - 11.1 K/uL  
 RBC 4.62 4.10 - 5.70 M/uL  
 HGB 12.5 12.1 - 17.0 g/dL HCT 39.0 36.6 - 50.3 % MCV 84.4 80.0 - 99.0 FL  
 MCH 27.1 26.0 - 34.0 PG  
 MCHC 32.1 30.0 - 36.5 g/dL  
 RDW 13.2 11.5 - 14.5 % PLATELET 646 017 - 217 K/uL MPV 9.8 8.9 - 12.9 FL  
 NRBC 0.0 0  WBC ABSOLUTE NRBC 0.00 0.00 - 0.01 K/uL NEUTROPHILS 66 32 - 75 % LYMPHOCYTES 23 12 - 49 % MONOCYTES 10 5 - 13 % EOSINOPHILS 1 0 - 7 % BASOPHILS 0 0 - 1 % IMMATURE GRANULOCYTES 0 0.0 - 0.5 % ABS. NEUTROPHILS 4.0 1.8 - 8.0 K/UL  
 ABS. LYMPHOCYTES 1.4 0.8 - 3.5 K/UL  
 ABS. MONOCYTES 0.6 0.0 - 1.0 K/UL  
 ABS. EOSINOPHILS 0.0 0.0 - 0.4 K/UL  
 ABS. BASOPHILS 0.0 0.0 - 0.1 K/UL  
 ABS. IMM. GRANS. 0.0 0.00 - 0.04 K/UL  
 DF AUTOMATED METABOLIC PANEL, BASIC Collection Time: 10/16/18  7:01 PM  
Result Value Ref Range Sodium 141 136 - 145 mmol/L Potassium 3.8 3.5 - 5.1 mmol/L  Chloride 106 97 - 108 mmol/L  
 CO2 28 21 - 32 mmol/L  
 Anion gap 7 5 - 15 mmol/L Glucose 202 (H) 65 - 100 mg/dL BUN 16 6 - 20 MG/DL Creatinine 1.04 0.70 - 1.30 MG/DL  
 BUN/Creatinine ratio 15 12 - 20 GFR est AA >60 >60 ml/min/1.73m2 GFR est non-AA >60 >60 ml/min/1.73m2 Calcium 8.9 8.5 - 10.1 MG/DL STREP AG SCREEN, GROUP A Collection Time: 10/16/18  7:01 PM  
Result Value Ref Range Group A Strep Ag ID NEGATIVE  NEG Radiologic Studies - Medical Decision Making I am the first provider for this patient. I reviewed the vital signs, available nursing notes, past medical history, past surgical history, family history and social history. Vital Signs-Reviewed the patient's vital signs. Patient Vitals for the past 12 hrs: 
 Temp Pulse Resp BP SpO2  
10/16/18 1755 98.4 °F (36.9 °C) 75 16 134/69 98 % Records Reviewed: Nursing Notes and Old Medical Records Provider Notes (Medical Decision Making): DDx: viral pharyngitis, strep pharyngitis, hyperglycemia, dka/hhnk ED Course:  
Initial assessment performed. The patients presenting problems have been discussed, and they are in agreement with the care plan formulated and outlined with them. I have encouraged them to ask questions as they arise throughout their visit. 7:41 PM 
Feeling much better. Strep neg Critical Care Time: 0 minutes Disposition: 
DISCHARGE NOTE 
7:41 PM 
 
The patient has been re-evaluated and is ready for discharge. Reviewed available results with patient. Counseled pt on diagnosis and care plan. Pt has expressed understanding, and all questions have been answered. Pt agrees with plan and agrees to F/U as recommended, or return to the ED if their sxs worsen. Discharge instructions have been provided and explained to the pt, along with reasons to return to the ED. Written by EDDI Hayden, as dictated by Rocky Chacon. MD Marian. 
 
PLAN: 
1. Current Discharge Medication List  
  
START taking these medications Details lidocaine (XYLOCAINE) 2 % solution Take 10 mL by mouth as needed for Pain (every 4 hours prn pain). Qty: 1 Bottle, Refills: 0  
  
ibuprofen (MOTRIN) 800 mg tablet Take 1 Tab by mouth every eight (8) hours as needed for Pain. Qty: 15 Tab, Refills: 0  
  
  
 
2. Follow-up Information Follow up With Details Comments Contact Info Provider Unknown In 2 days  Patient not available to ask Return to ED if worse Diagnosis Clinical Impression: 1. Hyperglycemia 2. Viral pharyngitis Attestation: This note is prepared by Carlos Alberto Ribeiro, acting as Scribe for Renard Bull. MD Marian. Renard Bull. MD Marian: The scribe's documentation has been prepared under my direction and personally reviewed by me in its entirety. I confirm that the note above accurately reflects all work, treatment, procedures, and medical decision making performed by me.

## 2018-10-16 NOTE — ED TRIAGE NOTES
C/o sore throat and neck x 3 days, also concerned that \"i know I have an infection because my blood sugar has been real high,\" 200s at home.

## 2018-10-18 LAB
BACTERIA SPEC CULT: NORMAL
SERVICE CMNT-IMP: NORMAL

## 2019-03-11 ENCOUNTER — OFFICE VISIT (OUTPATIENT)
Dept: ENDOCRINOLOGY | Age: 59
End: 2019-03-11

## 2019-03-11 VITALS
WEIGHT: 178.8 LBS | HEART RATE: 86 BPM | RESPIRATION RATE: 16 BRPM | HEIGHT: 72 IN | SYSTOLIC BLOOD PRESSURE: 100 MMHG | BODY MASS INDEX: 24.22 KG/M2 | DIASTOLIC BLOOD PRESSURE: 64 MMHG

## 2019-03-11 DIAGNOSIS — E11.9 TYPE 2 DIABETES MELLITUS WITHOUT COMPLICATION, WITHOUT LONG-TERM CURRENT USE OF INSULIN (HCC): Primary | ICD-10-CM

## 2019-03-11 LAB
GLUCOSE POC: NORMAL MG/DL
HBA1C MFR BLD HPLC: 13.3 %

## 2019-03-11 RX ORDER — METFORMIN HYDROCHLORIDE 1000 MG/1
1000 TABLET ORAL 2 TIMES DAILY WITH MEALS
Qty: 180 TAB | Refills: 2 | Status: SHIPPED | OUTPATIENT
Start: 2019-03-11 | End: 2019-09-11 | Stop reason: SDUPTHER

## 2019-03-11 RX ORDER — GLIMEPIRIDE 2 MG/1
2 TABLET ORAL
Qty: 90 TAB | Refills: 2 | Status: SHIPPED | OUTPATIENT
Start: 2019-03-11 | End: 2019-09-11 | Stop reason: ALTCHOICE

## 2019-03-11 NOTE — PROGRESS NOTES
Endocrinology Visit    Chief Complaint: Type 2 diabetes    History of Present Illness: Lauri Sibley is a 59 y.o. male who returns for type 2 diabetes mellitus. He was previously uncontrolled with A1c 12.6% in November 2016 at his initial consultation with me. At that time he was not taking any medications for diabetes and reported symptoms including polyuria, polydipsia, headaches, fatigue, weight loss, and erectile dysfunction. I started him back on metformin and added a sulfonylurea as well. A1c in July 2017 returned much improved at 7.4%. Unfortunately I have not seen him since then and he is again off his medication due to lack of insurance for the past year. POC glucose is \"HHH\" and POC A1c is 13.3%. He reports polyuria, polydipsia, and weight loss. He admits that he has not been eating very healthy either, also drinking beer but he says it is not to excess. He has not been checking his sugars. He was previusly controlled on metformin 1000mg BID and glimepiride 2mg Qam. He has no known complications from diabetes. He does not currently have a primary care doctor but has an upcoming appointment in May.      Review of Systems as above, otherwise a 7 pt review is negative    Problem List:  Patient Active Problem List   Diagnosis Code    Eczema L30.9    Erectile dysfunction N52.9    Diabetes mellitus ([address] E11.9       Past Medical History:    Past Medical History:   Diagnosis Date    Diabetes ([address]     Diabetes mellitus ([address] 7/11/2012    Eczema 7/11/2012       Past Surgical History:  Past Surgical History:   Procedure Laterality Date    HX ORTHOPAEDIC      L knee scope       Social History:  Social History     Socioeconomic History    Marital status: SINGLE     Spouse name: Not on file    Number of children: Not on file    Years of education: Not on file    Highest education level: Not on file   Social Needs    Financial resource strain: Not on file    Food insecurity - worry: Not on file   [facility] Food insecurity - inability: Not on file    Transportation needs - medical: Not on file   [place] needs - non-medical: Not on file   Occupational History    Not on file   Tobacco Use    Smoking status: Current Every Day Smoker     Packs/day: 1.00    Smokeless tobacco: Current User   Substance and Sexual Activity    Alcohol use: Yes     Alcohol/week: 5.0 oz     Types: 5 Cans of beer, 5 Standard drinks or equivalent per week    Drug use: No     Comment: reports using last about one week ago, prior to that had been clean for several years    Sexual activity: Yes     Partners: Female   Other Topics Concern    Not on file   Social History Narrative    Not on file       Family History:  Family History   Problem Relation Age of Onset    Hypertension Mother     Arthritis-osteo Mother        Medications:     Current Outpatient Medications:     lidocaine (XYLOCAINE) 2 % solution, Take 10 mL by mouth as needed for Pain (every 4 hours prn pain). , Disp: 1 Bottle, Rfl: 0    ibuprofen (MOTRIN) 800 mg tablet, Take 1 Tab by mouth every eight (8) hours as needed for Pain., Disp: 15 Tab, Rfl: 0    DIPHENHYDRAMINE HCL/CALAMINE (IVAREST EX), by Apply Externally route., Disp: , Rfl:     glimepiride (AMARYL) 2 mg tablet, Take 1 Tab by mouth every morning., Disp: 90 Tab, Rfl: 3    metFORMIN (GLUCOPHAGE) 1,000 mg tablet, Take 1 Tab by mouth two (2) times daily (with meals). , Disp: 180 Tab, Rfl: 3    glucose blood VI test strips (BLOOD GLUCOSE TEST) strip, Check BG 1-2 times/day, Disp: 100 Strip, Rfl: 11    SILDENAFIL CITRATE (VIAGRA PO), Take  by mouth., Disp: , Rfl:     Blood-Glucose Meter (RELION ALL-IN-ONE METER) monitoring kit, Check BG 1-2 times/day, Disp: 1 Kit, Rfl: 0    Allergies:   Allergies   Allergen Reactions    Doxycycline Itching    Tramadol Itching    Pcn [Penicillins] Hives       Physical Examination:  Visit Vitals  /64   Pulse 86   Resp 16   Ht 6' (1.829 m)   Wt 178 lb 12.8 oz (81.1 kg) BMI 24.25 kg/m²     Gen: no acute distress  HEENT: mucous membranes moist, poor dentition  CAD: normal rate, regular rhythm. No murmur rubs or gallops  PULM: clear to ausculation, no wheezes, rhonchis or rales. EXT: no clubbing, cyanosis or edema  Neuro: grossly non focal  Psych: pleasant, cooperative, good insight into medical hx  Skin: warm, dry    Clinical Data Review:  Lab Results   Component Value Date/Time    Hemoglobin A1c 7.4 (H) 07/06/2017 11:36 AM    Hemoglobin A1c (POC) 6.3 11/14/2012 08:52 AM     Lab Results   Component Value Date/Time    Sodium 141 10/16/2018 07:01 PM    Potassium 3.8 10/16/2018 07:01 PM    Chloride 106 10/16/2018 07:01 PM    CO2 28 10/16/2018 07:01 PM    Anion gap 7 10/16/2018 07:01 PM    Glucose 202 (H) 10/16/2018 07:01 PM    BUN 16 10/16/2018 07:01 PM    Creatinine 1.04 10/16/2018 07:01 PM    BUN/Creatinine ratio 15 10/16/2018 07:01 PM    GFR est AA >60 10/16/2018 07:01 PM    GFR est non-AA >60 10/16/2018 07:01 PM    Calcium 8.9 10/16/2018 07:01 PM     Lab Results   Component Value Date/Time    Microalb/Creat ratio (ug/mg creat.) 15.0 11/30/2016 12:09 PM    Microalbumin urine (POC) 30 11/14/2012 09:01 AM     Lab Results   Component Value Date/Time    Cholesterol, total 177 07/06/2017 11:36 AM    Cholesterol (POC) 132 11/14/2012 08:53 AM    HDL Cholesterol 53 07/06/2017 11:36 AM    HDL Cholesterol (POC) 51 11/14/2012 08:53 AM    LDL Cholesterol (POC) 65 11/14/2012 08:53 AM    LDL, calculated 90 07/06/2017 11:36 AM    VLDL, calculated 34 07/06/2017 11:36 AM    Triglyceride 170 (H) 07/06/2017 11:36 AM    Triglycerides (POC) 80 11/14/2012 08:53 AM       Assessment and Plan:  Patient is a 59 y.o. male here for f/u of uncontrolled type 2 diabetes, control of which had improved after starting dual agent therapy and reducing dietary carbohydrate intake. His sugars are again uncontrolled due to lack of medication and insurance. Recommend resuming his previous regimen as below.  I also encouraged him to establish primary care - eventually he can f/u with his PCP for DM since he is relatively controlled on oral medications alone. Glycemic Medication Changes:  - resume glimepiride 2mg daily  - resume metformin 1000mg BID    DM Health Maintenance: pertinent items updated in HM tab  A1c: updated today  Cv/Lipids: not on statin, update nonfasting lipids today  BP/Renal: BP at goal, not on ACEi, microalbumin: update today  Podiatry: advised he see podiatry due to calluses, encouraged well-fitting footwear and daily inspection  Neuro: no evidence of neuropathy on exam   Ophtho: yearly eye exam recommended, reminded pt to get an appointment  Diet and exercise: discussed healthy eating and exercise recommendations, particularly reduction in dietary carbohydrates    I spent 25 minutes with the patient today and > 50% of the time was spent counseling the patient about diabetes management including medication options and dietary modification. Patient verbalized an understanding and will return to clinic PRN. Thank you for the opportunity to participate in this patient's care.     Pierre Rodriguez MD  [facility]  [facility]

## 2019-03-12 LAB
ALBUMIN/CREAT UR: 70.9 MG/G CREAT (ref 0–30)
BUN SERPL-MCNC: 14 MG/DL (ref 8–27)
BUN/CREAT SERPL: 17 (ref 10–24)
CHLORIDE SERPL-SCNC: 97 MMOL/L (ref 96–106)
CHOLEST SERPL-MCNC: 189 MG/DL (ref 100–199)
CO2 SERPL-SCNC: 20 MMOL/L (ref 20–29)
CREAT SERPL-MCNC: 0.81 MG/DL (ref 0.76–1.27)
CREAT UR-MCNC: 79.5 MG/DL
GLUCOSE SERPL-MCNC: 387 MG/DL (ref 65–99)
HDLC SERPL-MCNC: 65 MG/DL
LDLC SERPL CALC-MCNC: 70 MG/DL (ref 0–99)
MICROALBUMIN UR-MCNC: 56.4 UG/ML
POTASSIUM SERPL-SCNC: 4.1 MMOL/L (ref 3.5–5.2)
SODIUM SERPL-SCNC: 135 MMOL/L (ref 134–144)
TRIGL SERPL-MCNC: 269 MG/DL (ref 0–149)
VLDLC SERPL CALC-MCNC: 54 MG/DL (ref 5–40)

## 2019-03-18 ENCOUNTER — TELEPHONE (OUTPATIENT)
Dept: ENDOCRINOLOGY | Age: 59
End: 2019-03-18

## 2019-03-18 NOTE — TELEPHONE ENCOUNTER
----- Message from Shane Chandler sent at 3/18/2019 11:00 AM EDT -----  Regarding: Dr Ortega Murray (p) [phone], pt would like to know if his tests results are back yet from his last visit.

## 2019-03-18 NOTE — TELEPHONE ENCOUNTER
Yes, I will send a letter with his results. Microalbumin, triglycerides, and glucose were all elevated.

## 2019-05-09 ENCOUNTER — OFFICE VISIT (OUTPATIENT)
Dept: INTERNAL MEDICINE CLINIC | Age: 59
End: 2019-05-09

## 2019-05-09 VITALS
BODY MASS INDEX: 24.76 KG/M2 | WEIGHT: 182.8 LBS | OXYGEN SATURATION: 96 % | RESPIRATION RATE: 16 BRPM | HEART RATE: 73 BPM | DIASTOLIC BLOOD PRESSURE: 84 MMHG | SYSTOLIC BLOOD PRESSURE: 160 MMHG | HEIGHT: 72 IN | TEMPERATURE: 97 F

## 2019-05-09 DIAGNOSIS — Z23 ENCOUNTER FOR IMMUNIZATION: ICD-10-CM

## 2019-05-09 DIAGNOSIS — N52.8 OTHER MALE ERECTILE DYSFUNCTION: ICD-10-CM

## 2019-05-09 DIAGNOSIS — E11.21 TYPE 2 DIABETES WITH NEPHROPATHY (HCC): ICD-10-CM

## 2019-05-09 DIAGNOSIS — Z12.5 SCREENING FOR PROSTATE CANCER: ICD-10-CM

## 2019-05-09 DIAGNOSIS — B35.3 TINEA PEDIS OF BOTH FEET: ICD-10-CM

## 2019-05-09 DIAGNOSIS — I10 ESSENTIAL HYPERTENSION: ICD-10-CM

## 2019-05-09 DIAGNOSIS — R80.9 MICROALBUMINURIA: ICD-10-CM

## 2019-05-09 DIAGNOSIS — E55.9 VITAMIN D DEFICIENCY: ICD-10-CM

## 2019-05-09 DIAGNOSIS — E11.9 TYPE 2 DIABETES MELLITUS WITHOUT COMPLICATION, WITHOUT LONG-TERM CURRENT USE OF INSULIN (HCC): Primary | ICD-10-CM

## 2019-05-09 DIAGNOSIS — Z12.11 SCREENING FOR MALIGNANT NEOPLASM OF COLON: ICD-10-CM

## 2019-05-09 DIAGNOSIS — M17.10 ARTHRITIS OF KNEE: ICD-10-CM

## 2019-05-09 RX ORDER — ACETAMINOPHEN 500 MG
500 TABLET ORAL
Qty: 60 TAB | Refills: 5 | Status: SHIPPED | OUTPATIENT
Start: 2019-05-09 | End: 2019-11-06 | Stop reason: ALTCHOICE

## 2019-05-09 RX ORDER — TADALAFIL 5 MG/1
5 TABLET ORAL
Qty: 10 TAB | Refills: 0 | Status: SHIPPED | OUTPATIENT
Start: 2019-05-09 | End: 2021-03-08 | Stop reason: SDUPTHER

## 2019-05-09 RX ORDER — LISINOPRIL 10 MG/1
10 TABLET ORAL DAILY
Qty: 30 TAB | Refills: 5 | Status: SHIPPED | OUTPATIENT
Start: 2019-05-09 | End: 2019-09-11 | Stop reason: SDUPTHER

## 2019-05-09 RX ORDER — NYSTATIN 100000 U/G
CREAM TOPICAL 2 TIMES DAILY
Qty: 30 G | Refills: 0 | Status: SHIPPED | OUTPATIENT
Start: 2019-05-09 | End: 2019-11-06 | Stop reason: ALTCHOICE

## 2019-05-09 RX ORDER — DICLOFENAC SODIUM 10 MG/G
GEL TOPICAL
Qty: 100 G | Refills: 2 | Status: SHIPPED | OUTPATIENT
Start: 2019-05-09 | End: 2019-09-19 | Stop reason: SDUPTHER

## 2019-05-09 NOTE — ADDENDUM NOTE
Addended Jonathan [facility] on: 5/9/2019 11:44 AM 
 
 Modules accepted: Level of Service

## 2019-05-09 NOTE — PROGRESS NOTES
HISTORY OF PRESENT ILLNESS Francis Cortes is a 59 y.o. male here to establish care. He is diabetic, seeing endocrinologist.  On medications. Eye checkup not done. He is not monitoring blood pressure regularly. Noticed to have rash on both feet. Itchy. Need foot exam. 
Found to have elevated blood pressure. Not in medicine. No chest pain palpitation or shortness of breath. Labs reviewed, last lab shows microalbuminuria. Report bilateral knee pain from arthritis. Pain is in moderate intensity. He is not using any medicine. Need pneumonia vaccines. Need colonoscopy. HPI Review of Systems Constitutional: Negative. HENT: Negative. Eyes: Negative. Respiratory: Negative. Cardiovascular: Negative. Gastrointestinal: Negative. Genitourinary: Negative. Musculoskeletal: Positive for joint pain. Skin: Negative. Neurological: Negative. Endo/Heme/Allergies: Negative. Psychiatric/Behavioral: Negative. Physical Exam  
Constitutional: He is oriented to person, place, and time. He appears well-developed and well-nourished. No distress. HENT:  
Head: Normocephalic and atraumatic. Right Ear: External ear normal.  
Left Ear: External ear normal.  
Nose: Nose normal.  
Mouth/Throat: Oropharynx is clear and moist. No oropharyngeal exudate. Eyes: Pupils are equal, round, and reactive to light. Conjunctivae and EOM are normal.  
Neck: Normal range of motion. Neck supple. No JVD present. No thyromegaly present. Cardiovascular: Normal rate, regular rhythm, normal heart sounds and intact distal pulses. Pulmonary/Chest: Effort normal and breath sounds normal. No respiratory distress. He has no wheezes. Abdominal: Soft. Bowel sounds are normal. He exhibits no distension. There is no tenderness. Musculoskeletal: He exhibits tenderness. He exhibits no edema. Diabetic foot exam:  
Red blotchy rash with definite border Present in both feet Left Foot: 
 Visual Exam: normal  
 Pulse DP: 2+ (normal) Filament test: normal sensation Vibratory sensation: normal 
   
Right Foot: 
 Visual Exam: normal  
 Pulse DP: 2+ (normal) Filament test: normal sensation Vibratory sensation: normal 
B/L knee: Warm to touch. Joint crepitus present. Range of motion mildly restricted. Tenderness in the joint. Neurological: He is alert and oriented to person, place, and time. He has normal reflexes. He displays normal reflexes. No cranial nerve deficit. He exhibits normal muscle tone. Coordination normal.  
 
 
ASSESSMENT and PLAN Diagnoses and all orders for this visit: 
 
1. Type 2 diabetes mellitus without complication, without long-term current use of insulin ([facility] On metformin and Amaryl. Seeing Dr. Ebony Burkitt. Will check, 
-     REFERRAL TO OPHTHALMOLOGY 
-     METABOLIC PANEL, COMPREHENSIVE 
-     HEMOGLOBIN A1C WITH EAG 2. Microalbuminuria Will start patient on lisinopril. 3. Essential hypertension Elevated blood pressure. Will start, 
-     lisinopril (PRINIVIL, ZESTRIL) 10 mg tablet; Take 1 Tab by mouth daily. 
-     METABOLIC PANEL, COMPREHENSIVE 
-     CBC W/O DIFF Be on DASH diet. Follow-up with blood pressure log. 4. Type 2 diabetes with nephropathy ([facility] [address] 5. Arthritis of knee DJD in both knee joints. X-ray reviewed. Will refer, 
-     REFERRAL TO ORTHOPEDICS for injections. -     diclofenac (VOLTAREN) 1 % gel; Apply  to affected area two (2) times daily as needed for Pain. knee 
-     acetaminophen (TYLENOL) 500 mg tablet; Take 1 Tab by mouth two (2) times daily as needed for Pain. 6. Tinea pedis of both feet Will give, 
-     nystatin (MYCOSTATIN) topical cream; Apply  to affected area two (2) times a day. 7. Other male erectile dysfunction Will check, 
-     TESTOSTERONE, FREE & TOTAL 
-     tadalafil (CIALIS) 5 mg tablet; Take 1 Tab by mouth daily as needed (ED). 8. Vitamin D deficiency 
-     VITAMIN D, 25 HYDROXY 9. Screening for malignant neoplasm of colon 
-     REFERRAL TO GASTROENTEROLOGY 10. Screening for prostate cancer -     PSA, DIAGNOSTIC (PROSTATE SPECIFIC AG) 11. Encounter for immunization Will give, 
-     PNEUMOCOCCAL CONJ VACCINE 13 VALENT IM Issues reviewed with her: referral to Diabetic Education department, diabetic diet discussed in detail, written exchange diet given, home glucose monitoring emphasized, all medications, side effects and compliance discussed carefully, foot care discussed and Podiatry visits discussed, annual eye examinations at Ophthalmology discussed, long term diabetic complications discussed and labs immediately prior to next visit.

## 2019-05-09 NOTE — PROGRESS NOTES
Health Maintenance Due Topic Date Due  
 Hepatitis C Screening  1960  Pneumococcal 0-64 years (1 of 1 - PPSV23) 08/11/1964  
 EYE EXAM RETINAL OR DILATED  08/11/1968  DTaP/Tdap/Td series (1 - Tdap) 08/11/1979  Shingrix Vaccine Age 50> (1 of 2) 08/11/2008  FOBT Q 1 YEAR AGE 50-75  08/11/2008  FOOT EXAM Q1  11/30/2017 Chief Complaint Patient presents with  Complete Physical  
  New pt; establish care  Diabetes 1. Have you been to the ER, urgent care clinic since your last visit? Hospitalized since your last visit? No 
 
2. Have you seen or consulted any other health care providers outside of the [address] [place] [address] since your last visit? Include any pap smears or colon screening. No 
 
3) Do you have an Advance Directive on file? no 
 
4) Are you interested in receiving information on Advance Directives? NO Patient is accompanied by self I have received verbal consent from Abril Nj to discuss any/all medical information while they are present in the room. Abril Nj is a 59 y.o. male  who presents for routine immunization(s) Prevnar-13. Patient denies any symptoms , reactions or allergies that would exclude them from being immunized today. Risks and adverse reactions were discussed and the VIS was given to them. Patient voiced full understanding and signed Adult Immunization Consent form. All questions were addressed. Patient was observed for 10 min post injection. There were no reactions observed.  
 
Khang De La Torre LPN

## 2019-05-09 NOTE — PATIENT INSTRUCTIONS

## 2019-05-10 LAB — PSA SERPL-MCNC: 1.5 NG/ML (ref 0–4)

## 2019-05-11 LAB
25(OH)D3+25(OH)D2 SERPL-MCNC: 15.8 NG/ML (ref 30–100)
ALBUMIN SERPL-MCNC: 4.1 G/DL (ref 3.6–4.8)
ALBUMIN/GLOB SERPL: 1.2 {RATIO} (ref 1.2–2.2)
ALP SERPL-CCNC: 62 IU/L (ref 39–117)
ALT SERPL-CCNC: 90 IU/L (ref 0–44)
AST SERPL-CCNC: 65 IU/L (ref 0–40)
BILIRUB SERPL-MCNC: 0.4 MG/DL (ref 0–1.2)
BUN SERPL-MCNC: 17 MG/DL (ref 8–27)
BUN/CREAT SERPL: 22 (ref 10–24)
CALCIUM SERPL-MCNC: 9.6 MG/DL (ref 8.6–10.2)
CHLORIDE SERPL-SCNC: 103 MMOL/L (ref 96–106)
CO2 SERPL-SCNC: 25 MMOL/L (ref 20–29)
CREAT SERPL-MCNC: 0.76 MG/DL (ref 0.76–1.27)
ERYTHROCYTE [DISTWIDTH] IN BLOOD BY AUTOMATED COUNT: 14.4 % (ref 12.3–15.4)
EST. AVERAGE GLUCOSE BLD GHB EST-MCNC: 209 MG/DL
GLOBULIN SER CALC-MCNC: 3.4 G/DL (ref 1.5–4.5)
GLUCOSE SERPL-MCNC: 133 MG/DL (ref 65–99)
HBA1C MFR BLD: 8.9 % (ref 4.8–5.6)
HCT VFR BLD AUTO: 42.4 % (ref 37.5–51)
HGB BLD-MCNC: 14 G/DL (ref 13–17.7)
MCH RBC QN AUTO: 27.9 PG (ref 26.6–33)
MCHC RBC AUTO-ENTMCNC: 33 G/DL (ref 31.5–35.7)
MCV RBC AUTO: 85 FL (ref 79–97)
PLATELET # BLD AUTO: 178 X10E3/UL (ref 150–379)
POTASSIUM SERPL-SCNC: 4.4 MMOL/L (ref 3.5–5.2)
PROT SERPL-MCNC: 7.5 G/DL (ref 6–8.5)
RBC # BLD AUTO: 5.02 X10E6/UL (ref 4.14–5.8)
SODIUM SERPL-SCNC: 142 MMOL/L (ref 134–144)
TESTOST FREE SERPL-MCNC: 4.8 PG/ML (ref 6.6–18.1)
TESTOST SERPL-MCNC: 816 NG/DL (ref 264–916)
WBC # BLD AUTO: 3.8 X10E3/UL (ref 3.4–10.8)

## 2019-05-15 DIAGNOSIS — E55.9 VITAMIN D DEFICIENCY: ICD-10-CM

## 2019-05-15 DIAGNOSIS — E11.9 TYPE 2 DIABETES MELLITUS WITHOUT COMPLICATION, WITHOUT LONG-TERM CURRENT USE OF INSULIN (HCC): Primary | ICD-10-CM

## 2019-05-15 RX ORDER — ERGOCALCIFEROL 1.25 MG/1
50000 CAPSULE ORAL
Qty: 4 CAP | Refills: 3 | Status: SHIPPED | OUTPATIENT
Start: 2019-05-15 | End: 2019-11-13 | Stop reason: SDUPTHER

## 2019-05-15 NOTE — PROGRESS NOTES
Diabetes is not controlled. Will add Januvia 50 mg in the morning. Be on ADA diet and exercise. Testosterone level slightly low. Refer patient to urologist. 
vit D level very low.will start on vit D 50,000 unit 1 cap weekly for 4 months. will repeat level in 4 months. adv to be on milk product and expose to sun for 20 min a day.

## 2019-05-28 ENCOUNTER — TELEPHONE (OUTPATIENT)
Dept: INTERNAL MEDICINE CLINIC | Age: 59
End: 2019-05-28

## 2019-05-28 NOTE — TELEPHONE ENCOUNTER
----- Message from Jenae Taveras sent at 5/28/2019  9:38 AM EDT -----  Regarding: Dr. Cornelia Alberts  Pt is requesting a call to discuss previous labs/results. Best contact number [phone].

## 2019-05-28 NOTE — PROGRESS NOTES
Called the pt and after verifying HIPAA, advised her of her lab results and provider's recommendations. The pt voiced thanks and understanding and a copy of his labs and his referral were mailed to him.

## 2019-05-31 DIAGNOSIS — R79.89 LOW TESTOSTERONE IN MALE: Primary | ICD-10-CM

## 2019-09-04 ENCOUNTER — HOSPITAL ENCOUNTER (EMERGENCY)
Age: 59
Discharge: OTHER INSTITUTION | End: 2019-09-05
Attending: EMERGENCY MEDICINE
Payer: MEDICAID

## 2019-09-04 DIAGNOSIS — N17.9 ACUTE RENAL FAILURE, UNSPECIFIED ACUTE RENAL FAILURE TYPE (HCC): Primary | ICD-10-CM

## 2019-09-04 LAB
GLUCOSE BLD STRIP.AUTO-MCNC: 271 MG/DL (ref 65–100)
SERVICE CMNT-IMP: ABNORMAL

## 2019-09-04 PROCEDURE — 82962 GLUCOSE BLOOD TEST: CPT

## 2019-09-04 PROCEDURE — 99284 EMERGENCY DEPT VISIT MOD MDM: CPT

## 2019-09-04 PROCEDURE — 96360 HYDRATION IV INFUSION INIT: CPT

## 2019-09-04 PROCEDURE — 96361 HYDRATE IV INFUSION ADD-ON: CPT

## 2019-09-05 ENCOUNTER — HOSPITAL ENCOUNTER (OUTPATIENT)
Age: 59
Setting detail: OBSERVATION
Discharge: HOME | End: 2019-09-06
Attending: HOSPITALIST | Admitting: HOSPITALIST
Payer: MEDICAID

## 2019-09-05 ENCOUNTER — APPOINTMENT (OUTPATIENT)
Dept: ULTRASOUND IMAGING | Age: 59
End: 2019-09-05
Attending: HOSPITALIST
Payer: MEDICAID

## 2019-09-05 ENCOUNTER — APPOINTMENT (OUTPATIENT)
Dept: GENERAL RADIOLOGY | Age: 59
End: 2019-09-05
Attending: HOSPITALIST
Payer: MEDICAID

## 2019-09-05 VITALS
RESPIRATION RATE: 14 BRPM | WEIGHT: 171 LBS | SYSTOLIC BLOOD PRESSURE: 113 MMHG | DIASTOLIC BLOOD PRESSURE: 64 MMHG | HEART RATE: 65 BPM | BODY MASS INDEX: 23.19 KG/M2 | OXYGEN SATURATION: 100 % | TEMPERATURE: 98.2 F

## 2019-09-05 PROBLEM — N17.9 AKI (ACUTE KIDNEY INJURY) (HCC): Status: ACTIVE | Noted: 2019-09-05

## 2019-09-05 PROBLEM — E11.9 DIABETES (HCC): Status: ACTIVE | Noted: 2019-09-05

## 2019-09-05 PROBLEM — E86.0 DEHYDRATION: Status: ACTIVE | Noted: 2019-09-05

## 2019-09-05 LAB
ALBUMIN SERPL-MCNC: 4 G/DL (ref 3.5–5)
ALBUMIN/GLOB SERPL: 1 {RATIO} (ref 1.1–2.2)
ALP SERPL-CCNC: 71 U/L (ref 45–117)
ALT SERPL-CCNC: 140 U/L (ref 12–78)
AMPHET UR QL SCN: NEGATIVE
ANION GAP SERPL CALC-SCNC: 11 MMOL/L (ref 5–15)
APPEARANCE UR: ABNORMAL
APTT PPP: 22.8 SEC (ref 22.1–32)
AST SERPL-CCNC: 100 U/L (ref 15–37)
BACTERIA URNS QL MICRO: NEGATIVE /HPF
BARBITURATES UR QL SCN: NEGATIVE
BASOPHILS # BLD: 0 K/UL (ref 0–0.1)
BASOPHILS NFR BLD: 0 % (ref 0–1)
BENZODIAZ UR QL: NEGATIVE
BILIRUB SERPL-MCNC: 0.5 MG/DL (ref 0.2–1)
BILIRUB UR QL CFM: NEGATIVE
BUN SERPL-MCNC: 35 MG/DL (ref 6–20)
BUN/CREAT SERPL: 10 (ref 12–20)
CALCIUM SERPL-MCNC: 10.4 MG/DL (ref 8.5–10.1)
CANNABINOIDS UR QL SCN: NEGATIVE
CHLORIDE SERPL-SCNC: 99 MMOL/L (ref 97–108)
CHLORIDE UR-SCNC: 150 MMOL/L
CK SERPL-CCNC: 110 U/L (ref 39–308)
CO2 SERPL-SCNC: 25 MMOL/L (ref 21–32)
COCAINE UR QL SCN: NEGATIVE
COLOR UR: ABNORMAL
CREAT SERPL-MCNC: 3.62 MG/DL (ref 0.7–1.3)
CREAT UR-MCNC: 121 MG/DL
CRP SERPL-MCNC: 0.37 MG/DL (ref 0–0.6)
DIFFERENTIAL METHOD BLD: ABNORMAL
DRUG SCRN COMMENT,DRGCM: NORMAL
EOSINOPHIL # BLD: 0 K/UL (ref 0–0.4)
EOSINOPHIL NFR BLD: 0 % (ref 0–7)
EPITH CASTS URNS QL MICRO: ABNORMAL /LPF
ERYTHROCYTE [DISTWIDTH] IN BLOOD BY AUTOMATED COUNT: 12.9 % (ref 11.5–14.5)
GLOBULIN SER CALC-MCNC: 4.1 G/DL (ref 2–4)
GLUCOSE BLD STRIP.AUTO-MCNC: 114 MG/DL (ref 65–100)
GLUCOSE BLD STRIP.AUTO-MCNC: 135 MG/DL (ref 65–100)
GLUCOSE BLD STRIP.AUTO-MCNC: 197 MG/DL (ref 65–100)
GLUCOSE BLD STRIP.AUTO-MCNC: 96 MG/DL (ref 65–100)
GLUCOSE SERPL-MCNC: 253 MG/DL (ref 65–100)
GLUCOSE UR STRIP.AUTO-MCNC: 100 MG/DL
HCT VFR BLD AUTO: 45.6 % (ref 36.6–50.3)
HGB BLD-MCNC: 14.9 G/DL (ref 12.1–17)
HGB UR QL STRIP: NEGATIVE
IMM GRANULOCYTES # BLD AUTO: 0.1 K/UL (ref 0–0.04)
IMM GRANULOCYTES NFR BLD AUTO: 1 % (ref 0–0.5)
INR PPP: 1 (ref 0.9–1.1)
KETONES UR QL STRIP.AUTO: ABNORMAL MG/DL
LEUKOCYTE ESTERASE UR QL STRIP.AUTO: ABNORMAL
LYMPHOCYTES # BLD: 0.7 K/UL (ref 0.8–3.5)
LYMPHOCYTES NFR BLD: 12 % (ref 12–49)
MAGNESIUM SERPL-MCNC: 1.8 MG/DL (ref 1.6–2.4)
MCH RBC QN AUTO: 28.1 PG (ref 26–34)
MCHC RBC AUTO-ENTMCNC: 32.7 G/DL (ref 30–36.5)
MCV RBC AUTO: 86 FL (ref 80–99)
METHADONE UR QL: NEGATIVE
MONOCYTES # BLD: 0.8 K/UL (ref 0–1)
MONOCYTES NFR BLD: 13 % (ref 5–13)
MUCOUS THREADS URNS QL MICRO: ABNORMAL /LPF
NEUTS SEG # BLD: 4.2 K/UL (ref 1.8–8)
NEUTS SEG NFR BLD: 74 % (ref 32–75)
NITRITE UR QL STRIP.AUTO: NEGATIVE
NRBC # BLD: 0 K/UL (ref 0–0.01)
NRBC BLD-RTO: 0 PER 100 WBC
OPIATES UR QL: NEGATIVE
PCP UR QL: NEGATIVE
PH UR STRIP: 5 [PH] (ref 5–8)
PLATELET # BLD AUTO: 171 K/UL (ref 150–400)
PMV BLD AUTO: 10.4 FL (ref 8.9–12.9)
POTASSIUM SERPL-SCNC: 3.7 MMOL/L (ref 3.5–5.1)
PROT SERPL-MCNC: 8.1 G/DL (ref 6.4–8.2)
PROT UR STRIP-MCNC: 100 MG/DL
PROTHROMBIN TIME: 10.1 SEC (ref 9–11.1)
RBC # BLD AUTO: 5.3 M/UL (ref 4.1–5.7)
RBC #/AREA URNS HPF: ABNORMAL /HPF (ref 0–5)
RBC MORPH BLD: ABNORMAL
SERVICE CMNT-IMP: ABNORMAL
SERVICE CMNT-IMP: NORMAL
SODIUM SERPL-SCNC: 135 MMOL/L (ref 136–145)
SODIUM UR-SCNC: 153 MMOL/L
SP GR UR REFRACTOMETRY: 1.02 (ref 1–1.03)
THERAPEUTIC RANGE,PTTT: NORMAL SECS (ref 58–77)
UROBILINOGEN UR QL STRIP.AUTO: 1 EU/DL (ref 0.2–1)
WBC # BLD AUTO: 5.8 K/UL (ref 4.1–11.1)
WBC URNS QL MICRO: ABNORMAL /HPF (ref 0–4)

## 2019-09-05 PROCEDURE — 82784 ASSAY IGA/IGD/IGG/IGM EACH: CPT

## 2019-09-05 PROCEDURE — 81001 URINALYSIS AUTO W/SCOPE: CPT

## 2019-09-05 PROCEDURE — 82962 GLUCOSE BLOOD TEST: CPT

## 2019-09-05 PROCEDURE — 83735 ASSAY OF MAGNESIUM: CPT

## 2019-09-05 PROCEDURE — 76700 US EXAM ABDOM COMPLETE: CPT

## 2019-09-05 PROCEDURE — 74011250637 HC RX REV CODE- 250/637: Performed by: HOSPITALIST

## 2019-09-05 PROCEDURE — 86140 C-REACTIVE PROTEIN: CPT

## 2019-09-05 PROCEDURE — 85730 THROMBOPLASTIN TIME PARTIAL: CPT

## 2019-09-05 PROCEDURE — 85025 COMPLETE CBC W/AUTO DIFF WBC: CPT

## 2019-09-05 PROCEDURE — 99218 HC RM OBSERVATION: CPT

## 2019-09-05 PROCEDURE — 74011250636 HC RX REV CODE- 250/636: Performed by: EMERGENCY MEDICINE

## 2019-09-05 PROCEDURE — 80053 COMPREHEN METABOLIC PANEL: CPT

## 2019-09-05 PROCEDURE — 80307 DRUG TEST PRSMV CHEM ANLYZR: CPT

## 2019-09-05 PROCEDURE — 84300 ASSAY OF URINE SODIUM: CPT

## 2019-09-05 PROCEDURE — 74011636637 HC RX REV CODE- 636/637: Performed by: HOSPITALIST

## 2019-09-05 PROCEDURE — 71046 X-RAY EXAM CHEST 2 VIEWS: CPT

## 2019-09-05 PROCEDURE — 65270000015 HC RM PRIVATE ONCOLOGY

## 2019-09-05 PROCEDURE — 82570 ASSAY OF URINE CREATININE: CPT

## 2019-09-05 PROCEDURE — 82436 ASSAY OF URINE CHLORIDE: CPT

## 2019-09-05 PROCEDURE — 96372 THER/PROPH/DIAG INJ SC/IM: CPT

## 2019-09-05 PROCEDURE — 74011250636 HC RX REV CODE- 250/636: Performed by: HOSPITALIST

## 2019-09-05 PROCEDURE — 80074 ACUTE HEPATITIS PANEL: CPT

## 2019-09-05 PROCEDURE — 36415 COLL VENOUS BLD VENIPUNCTURE: CPT

## 2019-09-05 PROCEDURE — 82550 ASSAY OF CK (CPK): CPT

## 2019-09-05 PROCEDURE — 85610 PROTHROMBIN TIME: CPT

## 2019-09-05 PROCEDURE — 94760 N-INVAS EAR/PLS OXIMETRY 1: CPT

## 2019-09-05 RX ORDER — HEPARIN SODIUM 5000 [USP'U]/ML
5000 INJECTION, SOLUTION INTRAVENOUS; SUBCUTANEOUS EVERY 8 HOURS
Status: DISCONTINUED | OUTPATIENT
Start: 2019-09-05 | End: 2019-09-06 | Stop reason: HOSPADM

## 2019-09-05 RX ORDER — SODIUM CHLORIDE 9 MG/ML
150 INJECTION, SOLUTION INTRAVENOUS CONTINUOUS
Status: DISCONTINUED | OUTPATIENT
Start: 2019-09-05 | End: 2019-09-06 | Stop reason: HOSPADM

## 2019-09-05 RX ORDER — INSULIN LISPRO 100 [IU]/ML
INJECTION, SOLUTION INTRAVENOUS; SUBCUTANEOUS
Status: DISCONTINUED | OUTPATIENT
Start: 2019-09-05 | End: 2019-09-06 | Stop reason: HOSPADM

## 2019-09-05 RX ORDER — ACETAMINOPHEN 325 MG/1
650 TABLET ORAL
Status: DISCONTINUED | OUTPATIENT
Start: 2019-09-05 | End: 2019-09-06 | Stop reason: HOSPADM

## 2019-09-05 RX ORDER — FOLIC ACID 1 MG/1
1 TABLET ORAL DAILY
Status: DISCONTINUED | OUTPATIENT
Start: 2019-09-05 | End: 2019-09-06 | Stop reason: HOSPADM

## 2019-09-05 RX ORDER — SODIUM CHLORIDE 0.9 % (FLUSH) 0.9 %
5-40 SYRINGE (ML) INJECTION EVERY 8 HOURS
Status: DISCONTINUED | OUTPATIENT
Start: 2019-09-05 | End: 2019-09-06 | Stop reason: HOSPADM

## 2019-09-05 RX ORDER — IBUPROFEN 200 MG
1 TABLET ORAL DAILY
Status: DISCONTINUED | OUTPATIENT
Start: 2019-09-05 | End: 2019-09-06 | Stop reason: HOSPADM

## 2019-09-05 RX ORDER — ASPIRIN 325 MG/1
100 TABLET, FILM COATED ORAL DAILY
Status: DISCONTINUED | OUTPATIENT
Start: 2019-09-05 | End: 2019-09-06 | Stop reason: HOSPADM

## 2019-09-05 RX ORDER — LORAZEPAM 2 MG/ML
2 INJECTION INTRAMUSCULAR
Status: DISCONTINUED | OUTPATIENT
Start: 2019-09-05 | End: 2019-09-06 | Stop reason: HOSPADM

## 2019-09-05 RX ORDER — LANOLIN ALCOHOL/MO/W.PET/CERES
100 CREAM (GRAM) TOPICAL DAILY
Status: DISCONTINUED | OUTPATIENT
Start: 2019-09-05 | End: 2019-09-05 | Stop reason: SDUPTHER

## 2019-09-05 RX ORDER — ONDANSETRON 2 MG/ML
4 INJECTION INTRAMUSCULAR; INTRAVENOUS
Status: DISCONTINUED | OUTPATIENT
Start: 2019-09-05 | End: 2019-09-06 | Stop reason: HOSPADM

## 2019-09-05 RX ORDER — IPRATROPIUM BROMIDE AND ALBUTEROL SULFATE 2.5; .5 MG/3ML; MG/3ML
3 SOLUTION RESPIRATORY (INHALATION)
Status: DISCONTINUED | OUTPATIENT
Start: 2019-09-05 | End: 2019-09-06 | Stop reason: HOSPADM

## 2019-09-05 RX ORDER — NAPROXEN SODIUM 220 MG
220 TABLET ORAL
COMMUNITY
End: 2019-09-19 | Stop reason: ALTCHOICE

## 2019-09-05 RX ORDER — SODIUM CHLORIDE 0.9 % (FLUSH) 0.9 %
5-40 SYRINGE (ML) INJECTION AS NEEDED
Status: DISCONTINUED | OUTPATIENT
Start: 2019-09-05 | End: 2019-09-06 | Stop reason: HOSPADM

## 2019-09-05 RX ORDER — MAGNESIUM SULFATE 100 %
4 CRYSTALS MISCELLANEOUS AS NEEDED
Status: DISCONTINUED | OUTPATIENT
Start: 2019-09-05 | End: 2019-09-06 | Stop reason: HOSPADM

## 2019-09-05 RX ORDER — DEXTROSE MONOHYDRATE 100 MG/ML
0-250 INJECTION, SOLUTION INTRAVENOUS AS NEEDED
Status: DISCONTINUED | OUTPATIENT
Start: 2019-09-05 | End: 2019-09-06 | Stop reason: HOSPADM

## 2019-09-05 RX ORDER — LORAZEPAM 2 MG/ML
1 INJECTION INTRAMUSCULAR
Status: DISCONTINUED | OUTPATIENT
Start: 2019-09-05 | End: 2019-09-06 | Stop reason: HOSPADM

## 2019-09-05 RX ADMIN — SODIUM CHLORIDE 1000 ML: 900 INJECTION, SOLUTION INTRAVENOUS at 01:33

## 2019-09-05 RX ADMIN — FOLIC ACID 1 MG: 1 TABLET ORAL at 10:40

## 2019-09-05 RX ADMIN — ACETAMINOPHEN 650 MG: 325 TABLET ORAL at 20:44

## 2019-09-05 RX ADMIN — HEPARIN SODIUM 5000 UNITS: 5000 INJECTION INTRAVENOUS; SUBCUTANEOUS at 10:40

## 2019-09-05 RX ADMIN — LINAGLIPTIN 5 MG: 5 TABLET, FILM COATED ORAL at 10:41

## 2019-09-05 RX ADMIN — Medication 10 ML: at 21:30

## 2019-09-05 RX ADMIN — INSULIN LISPRO 2 UNITS: 100 INJECTION, SOLUTION INTRAVENOUS; SUBCUTANEOUS at 12:14

## 2019-09-05 RX ADMIN — Medication 100 MG: at 10:41

## 2019-09-05 RX ADMIN — SODIUM CHLORIDE 150 ML/HR: 900 INJECTION, SOLUTION INTRAVENOUS at 23:17

## 2019-09-05 RX ADMIN — HEPARIN SODIUM 5000 UNITS: 5000 INJECTION INTRAVENOUS; SUBCUTANEOUS at 17:41

## 2019-09-05 RX ADMIN — SODIUM CHLORIDE 150 ML/HR: 900 INJECTION, SOLUTION INTRAVENOUS at 09:18

## 2019-09-05 RX ADMIN — SODIUM CHLORIDE 150 ML/HR: 900 INJECTION, SOLUTION INTRAVENOUS at 16:04

## 2019-09-05 RX ADMIN — MULTIPLE VITAMINS W/ MINERALS TAB 1 TABLET: TAB at 10:42

## 2019-09-05 RX ADMIN — Medication 10 ML: at 10:42

## 2019-09-05 NOTE — H&P
Hospitalist Admission Note    NAME: Amanda Portillo   :  1960   MRN:  [id number]     Date/Time:  2019 8:20 AM    Patient PCP: Brett Weldon MD  ______________________________________________________________________   Assessment & Plan:  ASHLEY, POA  Hypercalcemia 10.4, likely due to dehydration  --suspect combination of dehydration, meds (lisinopril, NSAID) and some degree of DM nephropathy.  --IVF  --renal US  --spep/upep  --renal consult    Uncontrolled DM type 2 A1c 8.9   --just restarted meds this week after off for 2 months (could not afford). --metformin likely cause of GI cramping and loose stool. Hold due to ashley  --hold amaryl due to ashley  --continue [place] and [place]. Moderate dose SSI    Elevated LFTs  ALT>AST  --check viral hepatitis panel, abd us, coags  --alcohol hepatitis seems less likely as AST < ALT  --outpatient GI referral    Tobacco abuse  --check CXR as crackles in bases and decreased BS.  --duoneb q6h prn  --nicotine patch, advised smoke cessation    Alcohol use  --3-4 beers/day and vodka 3x/week. Denies hx withdrawal but at risk so will put on prn ativan CIWA  --thiamine, MVI, folic acid    Black and green stool, resolving  --due to recent blueberry  --hemoccult stool and monitor H&H    Case management consult for financial difficulty    There is no height or weight on file to calculate BMI. Code: full  DVT prophylaxis: heparin  Surrogate decision maker:  Daughter Cheyenne Forrest        Subjective:   CHIEF COMPLAINT:  Abdominal cramp, loose stool    HISTORY OF PRESENT ILLNESS:     Amanda Portillo is a 62 y.o.  male  Transferred from [facility] for ashley. Restarted meds this Monday after not taking for 2 months. Having abdominal cramping, loose frequent stools. Came to ER due to severe hand cramping. Painting outside all day yesterday but did drink large bottle of water. We were asked to admit for work up and evaluation of the above problems.      Past Medical History:   Diagnosis Date    Arthritis     Diabetes ([facility] [address]     Diabetes mellitus ([facility] [address] 7/11/2012    Eczema 7/11/2012      Past Surgical History:   Procedure Laterality Date    HX ORTHOPAEDIC      L knee scope     Social History     Tobacco Use    Smoking status: Current Every Day Smoker     Packs/day: 1.00    Smokeless tobacco: Current User   Substance Use Topics    Alcohol use: Yes     Alcohol/week: 8.3 standard drinks     Types: 5 Cans of beer, 5 Standard drinks or equivalent per week    Drug use:  Remote hx cocaine and some heroin use. Denies current use  Unemployed due to arthritis in knees, doing painting work for money. Living with mother who has dementia    Family History   Problem Relation Age of Onset    Hypertension Mother     Arthritis-osteo Mother     No Known Problems Father     Diabetes Sister       Allergies   Allergen Reactions    Doxycycline Itching    Tramadol Itching    Pcn [Penicillins] Hives        Prior to Admission medications    Medication Sig Start Date End Date Taking? Authorizing Provider   naproxen sodium (ALEVE) 220 mg tablet Take 220 mg by mouth daily as needed. Yes Provider, Historical   SITagliptin (JANUVIA) 50 mg tablet Take 1 Tab by mouth daily. 5/15/19  Yes Eladio Kawasaki, MD   lisinopril (PRINIVIL, ZESTRIL) 10 mg tablet Take 1 Tab by mouth daily. 5/9/19  Yes Eladio Kawasaki, MD   tadalafil (CIALIS) 5 mg tablet Take 1 Tab by mouth daily as needed (ED). 5/9/19  Yes Eladio Kawasaki, MD   metFORMIN (GLUCOPHAGE) 1,000 mg tablet Take 1 Tab by mouth two (2) times daily (with meals). 3/11/19  Yes Lucie Orantes MD   glimepiride (AMARYL) 2 mg tablet Take 1 Tab by mouth every morning. 3/11/19  Yes Lucie Orantes MD   ergocalciferol (ERGOCALCIFEROL) 50,000 unit capsule Take 1 Cap by mouth every seven (7) days. 5/15/19   Eladio Kawasaki, MD   nystatin (MYCOSTATIN) topical cream Apply  to affected area two (2) times a day.  5/9/19   Eladio Kawasaki, MD   diclofenac (VOLTAREN) 1 % gel Apply  to affected area two (2) times daily as needed for Pain. knee 19   Britton Rodriges MD   acetaminophen (TYLENOL) 500 mg tablet Take 1 Tab by mouth two (2) times daily as needed for Pain. 19   Britton Rodriges MD   glucose blood VI test strips (BLOOD GLUCOSE TEST) strip Check BG 1-2 times/day 17   Mayela Soto MD   Blood-Glucose Meter (RELION ALL-IN-ONE METER) monitoring kit Check BG 1-2 times/day 16   Mayela Soto MD     REVIEW OF SYSTEMS:  POSITIVE= Bold. Negative = normal text  General:  fever, chills, sweats, generalized weakness, weight loss/gain, loss of appetite  Eyes:  blurred vision, eye pain, loss of vision, diplopia  Ear Nose and Throat:  rhinorrhea, pharyngitis  Respiratory:   cough, sputum production, SOB, wheezing, TREVIZO, pleuritic pain  Cardiology:  chest pain, palpitations, orthopnea, PND, edema, syncope   Gastrointestinal:  abdominal pain, N/V, dysphagia, diarrhea, constipation, bleeding  Genitourinary:  frequency, urgency, dysuria, hematuria, incontinence, hesitancy  Muskuloskeletal :  arthralgia, myalgia  Hematology:  easy bruising, bleeding, lymphadenopathy  Dermatological:  rash, ulceration, pruritis  Endocrine:  hot flashes or polydipsia  Neurological:  headache, dizziness, confusion, focal weakness, paresthesia, memory loss, gait disturbance  Psychological: anxiety, depression, agitation      Objective:   VITALS:    Visit Vitals  /61 (BP 1 Location: Left arm, BP Patient Position: At rest)   Pulse 73   Temp 97.7 °F (36.5 °C)   Resp 16   SpO2 100%     Temp (24hrs), Av.1 °F (36.7 °C), Min:97.7 °F (36.5 °C), Max:98.4 °F (36.9 °C)    There is no height or weight on file to calculate BMI. PHYSICAL EXAM:    General:    Alert, well nourished cooperative, no distress, appears stated age. HEENT: Atraumatic, anicteric sclerae, pink conjunctivae     No oral ulcers, mucosa dry, throat clear. Hearing intact.   Neck:  Supple, symmetrical,  thyroid: non tender  Lungs:   Crackles in bases, decreased BS. No Wheezing or Rhonchi. Chest wall:  No tenderness  No Accessory muscle use. Heart:   Regular  rhythm,  No  murmur   No gallop. No edema. Abdomen:   Soft, non-tender. Not distended. Bowel sounds normal. No masses  Extremities: No cyanosis. No clubbing  Skin:     Not pale Not Jaundiced  No rashes No joint swelling or redness. Psych:  Good insight. Not depressed. Not anxious or agitated. Neurologic: EOMs intact. No facial asymmetry. No aphasia or slurred speech. Symmetrical strength, Alert and oriented X 3. No muscle twitching    IMAGING RESULTS:   []       I have personally reviewed the actual   []     CXR  []     CT scan  CXR:  CT :  EKG:   ________________________________________________________________________  Care Plan discussed with:    Comments   Patient y    Family      RN     Care Manager y Dr. Jonatan Fry                  Consultant:      ________________________________________________________________________  Prophylaxis:  GI none   DVT heparin   ________________________________________________________________________  Recommended Disposition:   Home with Family y   HH/PT/OT/RN y   SNF/LTC    MAIK    ________________________________________________________________________  Code Status:  Full Code y   DNR/DNI    ________________________________________________________________________  TOTAL TIME: 50 minutes      Comments    y Reviewed previous records       ______________________________________________________________________  Flavio Dawson MD      Procedures: see electronic medical records for all procedures/Xrays and details which were not copied into this note but were reviewed prior to creation of Plan.     LAB DATA REVIEWED:    Recent Results (from the past 24 hour(s))   GLUCOSE, POC    Collection Time: 09/04/19 10:23 PM   Result Value Ref Range    Glucose (POC) 271 (H) 65 - 100 mg/dL    Performed by Caroline Calderón    MAGNESIUM    Collection Time: 09/05/19  1:36 AM   Result Value Ref Range    Magnesium 1.8 1.6 - 2.4 mg/dL   CBC WITH AUTOMATED DIFF    Collection Time: 09/05/19  1:36 AM   Result Value Ref Range    WBC 5.8 4.1 - 11.1 K/uL    RBC 5.30 4. 10 - 5.70 M/uL    HGB 14.9 12.1 - 17.0 g/dL    HCT 45.6 36.6 - 50.3 %    MCV 86.0 80.0 - 99.0 FL    MCH 28.1 26.0 - 34.0 PG    MCHC 32.7 30.0 - 36.5 g/dL    RDW 12.9 11.5 - 14.5 %    PLATELET 314 416 - 370 K/uL    MPV 10.4 8.9 - 12.9 FL    NRBC 0.0 0  WBC    ABSOLUTE NRBC 0.00 0.00 - 0.01 K/uL    NEUTROPHILS 74 32 - 75 %    LYMPHOCYTES 12 12 - 49 %    MONOCYTES 13 5 - 13 %    EOSINOPHILS 0 0 - 7 %    BASOPHILS 0 0 - 1 %    IMMATURE GRANULOCYTES 1 (H) 0.0 - 0.5 %    ABS. NEUTROPHILS 4.2 1.8 - 8.0 K/UL    ABS. LYMPHOCYTES 0.7 (L) 0.8 - 3.5 K/UL    ABS. MONOCYTES 0.8 0.0 - 1.0 K/UL    ABS. EOSINOPHILS 0.0 0.0 - 0.4 K/UL    ABS. BASOPHILS 0.0 0.0 - 0.1 K/UL    ABS. IMM.  GRANS. 0.1 (H) 0.00 - 0.04 K/UL    DF SMEAR SCANNED      RBC COMMENTS NORMOCYTIC, NORMOCHROMIC     URINALYSIS W/ RFLX MICROSCOPIC    Collection Time: 09/05/19  1:36 AM   Result Value Ref Range    Color DARK YELLOW      Appearance TURBID (A) CLEAR      Specific gravity 1.025 1.003 - 1.030      pH (UA) 5.0 5.0 - 8.0      Protein 100 (A) NEG mg/dL    Glucose 100 (A) NEG mg/dL    Ketone TRACE (A) NEG mg/dL    Blood NEGATIVE  NEG      Urobilinogen 1.0 0.2 - 1.0 EU/dL    Nitrites NEGATIVE  NEG      Leukocyte Esterase SMALL (A) NEG     METABOLIC PANEL, COMPREHENSIVE    Collection Time: 09/05/19  1:36 AM   Result Value Ref Range    Sodium 135 (L) 136 - 145 mmol/L    Potassium 3.7 3.5 - 5.1 mmol/L    Chloride 99 97 - 108 mmol/L    CO2 25 21 - 32 mmol/L    Anion gap 11 5 - 15 mmol/L    Glucose 253 (H) 65 - 100 mg/dL    BUN 35 (H) 6 - 20 MG/DL    Creatinine 3.62 (H) 0.70 - 1.30 MG/DL    BUN/Creatinine ratio 10 (L) 12 - 20      GFR est AA 21 (L) >60 ml/min/1.73m2    GFR est non-AA 17 (L) >60 ml/min/1.73m2    Calcium 10.4 (H) 8.5 - 10.1 MG/DL    Bilirubin, total 0.5 0.2 - 1.0 MG/DL    ALT (SGPT) 140 (H) 12 - 78 U/L    AST (SGOT) 100 (H) 15 - 37 U/L    Alk.  phosphatase 71 45 - 117 U/L    Protein, total 8.1 6.4 - 8.2 g/dL    Albumin 4.0 3.5 - 5.0 g/dL    Globulin 4.1 (H) 2.0 - 4.0 g/dL    A-G Ratio 1.0 (L) 1.1 - 2.2     URINE MICROSCOPIC ONLY    Collection Time: 09/05/19  1:36 AM   Result Value Ref Range    WBC 0-4 0 - 4 /hpf    RBC 0-5 0 - 5 /hpf    Epithelial cells FEW FEW /lpf    Bacteria NEGATIVE  NEG /hpf    Mucus 2+ (A) NEG /lpf   BILIRUBIN, CONFIRM    Collection Time: 09/05/19  1:36 AM   Result Value Ref Range    Bilirubin UA, confirm NEGATIVE  NEG     CK    Collection Time: 09/05/19  2:45 AM   Result Value Ref Range     39 - 308 U/L

## 2019-09-05 NOTE — ED PROVIDER NOTES
58-year-old male with a history of diabetes, arthritis, and eczema presents with concern over hand cramping which he began experiencing today and also concerned about multiple episodes of loose stool, nausea, and elevated home blood sugar readings. He states he has been off his glyburide and metformin for several months and just saw his primary care doctor yesterday and resumed those medications. 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 Also states his fingers are \"getting stuck\" and hurting. He states, \"it was not like this before pills. \"  Describes feeling like his stomach is unsettled and he is nauseated and feeling like he has to have multiple bowel movements. 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 Complaining of dry mouth and headache. States his blood sugar was in the 300s this morning and he tried drinking water and his blood sugar was 271. \"Something is not right. \"           Past Medical History:   Diagnosis Date    Arthritis     Diabetes ([facility] [address]     Diabetes mellitus ([facility] [address] 7/11/2012    Eczema 7/11/2012       Past Surgical History:   Procedure Laterality Date    HX ORTHOPAEDIC      L knee scope         Family History:   Problem Relation Age of Onset    Hypertension Mother     Arthritis-osteo Mother     No Known Problems Father     Diabetes Sister        Social History     Socioeconomic History    Marital status: SINGLE     Spouse name: Not on file    Number of children: Not on file    Years of education: Not on file    Highest education level: Not on file   Occupational History    Not on file   Social Needs    Financial resource strain: Not on file    Food insecurity:     Worry: Not on file     Inability: Not on file    Transportation needs: Medical: Not on file     Non-medical: Not on file   Tobacco Use    Smoking status: Current Every Day Smoker     Packs/day: 1.00    Smokeless tobacco: Current User   Substance and Sexual Activity    Alcohol use: Yes     Alcohol/week: 8.3 standard drinks     Types: 5 Cans of beer, 5 Standard drinks or equivalent per week    Drug use: No     Types: Cocaine, Heroin     Comment: reports using last about one week ago, prior to that had been clean for several years    Sexual activity: Yes     Partners: Female     Comment: single,5 children. not working now   Lifestyle    Physical activity:     Days per week: Not on file     Minutes per session: Not on file    Stress: Not on file   Relationships    Social connections:     Talks on phone: Not on file     Gets together: Not on file     Attends [employer] service: Not on file     Active member of club or organization: Not on file     Attends meetings of clubs or organizations: Not on file     Relationship status: Not on file    Intimate partner violence:     Fear of current or ex partner: Not on file     Emotionally abused: Not on file     Physically abused: Not on file     Forced sexual activity: Not on file   Other Topics Concern    Not on file   Social History Narrative    Not on file         ALLERGIES: Doxycycline; Tramadol; and Pcn [penicillins]    Review of Systems   Constitutional: Negative. Negative for fever. HENT: Negative. Negative for drooling, facial swelling and trouble swallowing. Eyes: Negative. Negative for discharge and redness. Respiratory: Negative. Negative for chest tightness, shortness of breath and wheezing. Cardiovascular: Negative. Negative for chest pain. Gastrointestinal: Positive for diarrhea and nausea. Negative for abdominal distention, abdominal pain, constipation and vomiting. Endocrine: Negative. Genitourinary: Negative. Negative for difficulty urinating and dysuria. Musculoskeletal: Positive for myalgias. Negative for arthralgias. Skin: Negative. Negative for color change and rash. Allergic/Immunologic: Negative. Neurological: Negative. Negative for syncope, facial asymmetry and speech difficulty. Hematological: Negative. Psychiatric/Behavioral: Negative. Negative for agitation and confusion. All other systems reviewed and are negative. Vitals:    09/04/19 2148   BP: 101/61   Pulse: 98   Resp: 18   Temp: 98.1 °F (36.7 °C)   SpO2: 97%   Weight: 77.6 kg (171 lb)            Physical Exam   Constitutional: He is oriented to person, place, and time. He appears well-developed and well-nourished. HENT:   Head: Normocephalic and atraumatic. Eyes: Conjunctivae and EOM are normal.   Neck: Neck supple. Cardiovascular: Normal rate, regular rhythm and intact distal pulses. Pulmonary/Chest: No accessory muscle usage. No respiratory distress. Abdominal: Soft. Normal appearance and bowel sounds are normal. He exhibits no distension and no mass. There is no tenderness. There is no guarding. Musculoskeletal: Normal range of motion. No bony tenderness of either hand. I did notice some muscle twitching on the dorsum of his left hand, between index finger and thumb   Neurological: He is alert and oriented to person, place, and time. Skin: Skin is warm and dry. Psychiatric: He has a normal mood and affect. His behavior is normal. Thought content normal.   Nursing note and vitals reviewed. MDM  Number of Diagnoses or Management Options  Acute renal failure, unspecified acute renal failure type [facility]):   Diagnosis management comments: ddx - Carpal spasm, medication effect, dehydration, viral syndrome, electrolyte abnormality, hyperglycemia, DKA, gastroenteritis, diverticulitis, arf    ED Course as of Sep 05 0647   Thu Sep 05, 2019   0229 Patient accepted by Dr. Rashaun Jordan, hospitalist at [facility], as medical admission there because they have nephrology and we do not.     [SS]      ED Course User Index  [SS] Steve Morris MD       Procedures      LABORATORY TESTS:  Recent Results (from the past 12 hour(s))   GLUCOSE, POC    Collection Time: 09/04/19 10:23 PM   Result Value Ref Range    Glucose (POC) 271 (H) 65 - 100 mg/dL    Performed by Erwin Ansari    MAGNESIUM    Collection Time: 09/05/19  1:36 AM   Result Value Ref Range    Magnesium 1.8 1.6 - 2.4 mg/dL   CBC WITH AUTOMATED DIFF    Collection Time: 09/05/19  1:36 AM   Result Value Ref Range    WBC 5.8 4.1 - 11.1 K/uL    RBC 5.30 4. 10 - 5.70 M/uL    HGB 14.9 12.1 - 17.0 g/dL    HCT 45.6 36.6 - 50.3 %    MCV 86.0 80.0 - 99.0 FL    MCH 28.1 26.0 - 34.0 PG    MCHC 32.7 30.0 - 36.5 g/dL    RDW 12.9 11.5 - 14.5 %    PLATELET 123 598 - 206 K/uL    MPV 10.4 8.9 - 12.9 FL    NRBC 0.0 0  WBC    ABSOLUTE NRBC 0.00 0.00 - 0.01 K/uL    NEUTROPHILS 74 32 - 75 %    LYMPHOCYTES 12 12 - 49 %    MONOCYTES 13 5 - 13 %    EOSINOPHILS 0 0 - 7 %    BASOPHILS 0 0 - 1 %    IMMATURE GRANULOCYTES 1 (H) 0.0 - 0.5 %    ABS. NEUTROPHILS 4.2 1.8 - 8.0 K/UL    ABS. LYMPHOCYTES 0.7 (L) 0.8 - 3.5 K/UL    ABS. MONOCYTES 0.8 0.0 - 1.0 K/UL    ABS. EOSINOPHILS 0.0 0.0 - 0.4 K/UL    ABS. BASOPHILS 0.0 0.0 - 0.1 K/UL    ABS. IMM.  GRANS. 0.1 (H) 0.00 - 0.04 K/UL    DF SMEAR SCANNED      RBC COMMENTS NORMOCYTIC, NORMOCHROMIC     URINALYSIS W/ RFLX MICROSCOPIC    Collection Time: 09/05/19  1:36 AM   Result Value Ref Range    Color DARK YELLOW      Appearance TURBID (A) CLEAR      Specific gravity 1.025 1.003 - 1.030      pH (UA) 5.0 5.0 - 8.0      Protein 100 (A) NEG mg/dL    Glucose 100 (A) NEG mg/dL    Ketone TRACE (A) NEG mg/dL    Blood NEGATIVE  NEG      Urobilinogen 1.0 0.2 - 1.0 EU/dL    Nitrites NEGATIVE  NEG      Leukocyte Esterase SMALL (A) NEG     METABOLIC PANEL, COMPREHENSIVE    Collection Time: 09/05/19  1:36 AM   Result Value Ref Range    Sodium 135 (L) 136 - 145 mmol/L    Potassium 3.7 3.5 - 5.1 mmol/L    Chloride 99 97 - 108 mmol/L    CO2 25 21 - 32 mmol/L    Anion gap 11 5 - 15 mmol/L    Glucose 253 (H) 65 - 100 mg/dL    BUN 35 (H) 6 - 20 MG/DL    Creatinine 3.62 (H) 0.70 - 1.30 MG/DL    BUN/Creatinine ratio 10 (L) 12 - 20      GFR est AA 21 (L) >60 ml/min/1.73m2    GFR est non-AA 17 (L) >60 ml/min/1.73m2    Calcium 10.4 (H) 8.5 - 10.1 MG/DL    Bilirubin, total 0.5 0.2 - 1.0 MG/DL    ALT (SGPT) 140 (H) 12 - 78 U/L    AST (SGOT) 100 (H) 15 - 37 U/L    Alk. phosphatase 71 45 - 117 U/L    Protein, total 8.1 6.4 - 8.2 g/dL    Albumin 4.0 3.5 - 5.0 g/dL    Globulin 4.1 (H) 2.0 - 4.0 g/dL    A-G Ratio 1.0 (L) 1.1 - 2.2     URINE MICROSCOPIC ONLY    Collection Time: 09/05/19  1:36 AM   Result Value Ref Range    WBC 0-4 0 - 4 /hpf    RBC 0-5 0 - 5 /hpf    Epithelial cells FEW FEW /lpf    Bacteria NEGATIVE  NEG /hpf    Mucus 2+ (A) NEG /lpf   BILIRUBIN, CONFIRM    Collection Time: 09/05/19  1:36 AM   Result Value Ref Range    Bilirubin UA, confirm NEGATIVE  NEG     CK    Collection Time: 09/05/19  2:45 AM   Result Value Ref Range     39 - 308 U/L       IMAGING RESULTS:  No orders to display       MEDICATIONS GIVEN:  Medications   sodium chloride 0.9 % bolus infusion 1,000 mL (0 mL IntraVENous IV Completed 9/5/19 3758)       IMPRESSION:  1. Acute renal failure, unspecified acute renal failure type ([facility] [address]        PLAN:  1. Current Discharge Medication List        2.    Follow-up Information    None       Return to ED if worse

## 2019-09-05 NOTE — CONSULTS
Nephrology Consult Note     Brett Jc     www. [facility]              Phone - [phone]   Patient: Nicholas Peñaloza   YOB: 1960    Date- 9/5/2019  MRN: [id number]             REASON FOR CONSULTATION: ASHLEY  CONSULTING PHYSICIAN: DR. Sunitha Elam MD     ASSESSMENT and Plan:   · ASHLEY likely due to multiple factors  1 Lisinopril us  2 alevel use  3 GN can't be rule out - he has proteinuria and hematuria. 4 post renal obstruction - less likely. - check rush, anca, c3,c4, hepatitis panel  - hold lisinopril   - avoid NSAIDS  - continue ivf  - check bmp in am  · H/o dm  · Proteinuria  - check urine pr/cr   - vasculitis labs  · Current smoker  · ETOH abuse        · Active Problems:  ·   Dehydration (9/5/2019)  ·   ·   Diabetes ([address] (9/5/2019)  ·   ·   ASHLEY (acute kidney injury) ([address] (9/5/2019)  ·   ·     [x] High complexity decision making was performed  [x] Patient is at high-risk of decompensation with multiple organ involvement    Subjective:   HPI: Nicholas Peñaloza is a 62 y.o.  male. Patient is admitted with abdominal cramps and loose stool  He is found to have ashley - cr 3.6   His cr 0.76 in may 2019  He has been taking aleve  He has  Restarted metformin  He is on lisinopril for longtime  His urine shows proteinuria and hematuria. His renal usg- negative for hydro. He has been drinking 3-4 beer per day  He is not on any diuretics  No c/o sob,   No c/o chest pain,   No c/o nausea or vomiting  No c/o  fever. No c/o dysuria    Review of Systems:       A 11 point review of system was performed today. Pertinent positives and negatives are mentioned in the HPI. The reminder of the ROS is negative and noncontributory.     Past Medical History:   Diagnosis Date    Arthritis     Diabetes ([address]     Diabetes mellitus ([address] 7/11/2012    Eczema 7/11/2012      Past Surgical History:   Procedure Laterality Date    HX ORTHOPAEDIC      L knee scope      Prior to Admission medications    Medication Sig Start Date End Date Taking? Authorizing Provider   naproxen sodium (ALEVE) 220 mg tablet Take 220 mg by mouth daily as needed. Yes Provider, Historical   SITagliptin (JANUVIA) 50 mg tablet Take 1 Tab by mouth daily. 5/15/19  Yes Brett Weldon MD   lisinopril (PRINIVIL, ZESTRIL) 10 mg tablet Take 1 Tab by mouth daily. 5/9/19  Yes Brett Weldon MD   tadalafil (CIALIS) 5 mg tablet Take 1 Tab by mouth daily as needed (ED). 5/9/19  Yes Brett Weldon MD   metFORMIN (GLUCOPHAGE) 1,000 mg tablet Take 1 Tab by mouth two (2) times daily (with meals). 3/11/19  Yes Shaheen Cruz MD   glimepiride (AMARYL) 2 mg tablet Take 1 Tab by mouth every morning. 3/11/19  Yes Shaheen Cruz MD   ergocalciferol (ERGOCALCIFEROL) 50,000 unit capsule Take 1 Cap by mouth every seven (7) days. 5/15/19   Brett Weldon MD   nystatin (MYCOSTATIN) topical cream Apply  to affected area two (2) times a day. 5/9/19   Brett Weldon MD   diclofenac (VOLTAREN) 1 % gel Apply  to affected area two (2) times daily as needed for Pain. knee 5/9/19   Brett Weldon MD   acetaminophen (TYLENOL) 500 mg tablet Take 1 Tab by mouth two (2) times daily as needed for Pain. 5/9/19   Brett Weldon MD   glucose blood VI test strips (BLOOD GLUCOSE TEST) strip Check BG 1-2 times/day 7/6/17   Shaheen Cruz MD   Blood-Glucose Meter (RELION ALL-IN-ONE METER) monitoring kit Check BG 1-2 times/day 11/30/16   Shaheen Cruz MD     Allergies   Allergen Reactions    Doxycycline Itching    Tramadol Itching    Pcn [Penicillins] Hives      Social History     Tobacco Use    Smoking status: Current Every Day Smoker     Packs/day: 1.00    Smokeless tobacco: Current User   Substance Use Topics    Alcohol use:  Yes     Alcohol/week: 8.3 standard drinks     Types: 5 Cans of beer, 5 Standard drinks or equivalent per week     Drinks per session: 3 or 4     Comment: 3-4 beers a day and 1 shot vodka 3x/week      Family History   Problem Relation Age of Onset    Hypertension Mother    [facility] Arthritis-osteo Mother     No Known Problems Father     Diabetes Sister         Objective:      Patient Vitals for the past 24 hrs:   Temp Pulse Resp BP SpO2   09/05/19 0808 97.7 °F (36.5 °C) 73 16 118/61 100 %     No intake/output data recorded.   Physical Exam:  General:Alert, No distress,   Eyes:No scleral icterus, No conjunctival pallor  Neck:Supple,no mass palpable,no thyromegaly  Lungs:Clears to auscultation Bilaterally, normal respiratory effort  CVS:RRR, S1 S2 normal,  No rub,  Abdomen:Soft, Non tender, No hepatosplenomegaly  Extremities:  LE edema  Skin:No rash or lesions, Warm and DRY   Psych: appropriate affect    :  No greer  Musculoskeletal : no redness, no joint tenderness  NEURO: Normal Speech, Non focal        CODE STATUS:  full  Care Plan discussed with:  patient     Chart reviewed.      TOTAL TIME: 76 Minutes   y Reviewed previous records   y Discussion with patient and/or family and questions answered   y >50% of visit spent in counseling and coordination of care        ECG[de-identified] Rev:yes  Xray/CT/US/MRI REV:yes  Lab Data Personally Reviewed: (see below)  Recent Labs     09/05/19  1107 09/05/19  0136   WBC  --  5.8   HGB  --  14.9   PLT  --  171   ANEU  --  4.2   INR 1.0  --    APTT 22.8  --    NA  --  135*   K  --  3.7   GLU  --  253*   BUN  --  35*   CREA  --  3.62*   ALT  --  140*   SGOT  --  100*   TBILI  --  0.5   AP  --  71   CA  --  10.4*   MG  --  1.8     Lab Results   Component Value Date/Time    Color DARK YELLOW 09/05/2019 01:36 AM    Appearance TURBID (A) 09/05/2019 01:36 AM    Specific gravity 1.025 09/05/2019 01:36 AM    pH (UA) 5.0 09/05/2019 01:36 AM    Protein 100 (A) 09/05/2019 01:36 AM    Glucose 100 (A) 09/05/2019 01:36 AM    Ketone TRACE (A) 09/05/2019 01:36 AM    Urobilinogen 1.0 09/05/2019 01:36 AM    Nitrites NEGATIVE  09/05/2019 01:36 AM    Leukocyte Esterase SMALL (A) 09/05/2019 01:36 AM    Epithelial cells FEW 09/05/2019 01:36 AM    Bacteria NEGATIVE  09/05/2019 01:36 AM    WBC 0-4 09/05/2019 01:36 AM    RBC 0-5 09/05/2019 01:36 AM       No results found for: IRON, FE, TIBC, IBCT, PSAT, FERR  Lab Results   Component Value Date/Time    Culture result: NORMAL RESPIRATORY YOUNG/NO BETA STREP ISOLATED 10/16/2018 07:01 PM    Culture result: NORMAL RESPIRATORY YOUNG/NO BETA STREP ISOLATED 05/05/2017 08:59 PM    Culture result: NORMAL RESPIRATORY YOUNG/NO BETA STREP ISOLATED 01/12/2017 12:18 PM     Prior to Admission Medications   Prescriptions Last Dose Informant Patient Reported? Taking? Blood-Glucose Meter (RELION ALL-IN-ONE METER) monitoring kit   No No   Sig: Check BG 1-2 times/day   SITagliptin (JANUVIA) 50 mg tablet   No Yes   Sig: Take 1 Tab by mouth daily. acetaminophen (TYLENOL) 500 mg tablet   No No   Sig: Take 1 Tab by mouth two (2) times daily as needed for Pain. diclofenac (VOLTAREN) 1 % gel Not Taking at Unknown time  No No   Sig: Apply  to affected area two (2) times daily as needed for Pain. knee   ergocalciferol (ERGOCALCIFEROL) 50,000 unit capsule Not Taking at Unknown time  No No   Sig: Take 1 Cap by mouth every seven (7) days. glimepiride (AMARYL) 2 mg tablet   No Yes   Sig: Take 1 Tab by mouth every morning. glucose blood VI test strips (BLOOD GLUCOSE TEST) strip   No No   Sig: Check BG 1-2 times/day   lisinopril (PRINIVIL, ZESTRIL) 10 mg tablet 9/4/2019 at Unknown time  No Yes   Sig: Take 1 Tab by mouth daily. metFORMIN (GLUCOPHAGE) 1,000 mg tablet 9/4/2019 at Unknown time  No Yes   Sig: Take 1 Tab by mouth two (2) times daily (with meals). naproxen sodium (ALEVE) 220 mg tablet 9/4/2019 at Unknown time  Yes Yes   Sig: Take 220 mg by mouth daily as needed. nystatin (MYCOSTATIN) topical cream Not Taking at Unknown time  No No   Sig: Apply  to affected area two (2) times a day.    tadalafil (CIALIS) 5 mg tablet 8/5/2019 at Unknown time  No Yes   Sig: Take 1 Tab by mouth daily as needed (ED). Facility-Administered Medications: None     Imaging:  RENAL USG  FINDINGS:      Routine ultrasound images of the abdomen were obtained.      The liver is without focal abnormality visualized. The common bile duct is  normal measuring 4 mm in diameter. The gallbladder appears within normal limits. The main portal vein is patent with hepatopedal flow.     The pancreatic head is obscured by bowel gas.      The spleen measures 12.6 cm in length and is within normal limits.     The right kidney measures 11.2 cm in length. No focal lesion or hydronephrosis.     The left kidney measures 12.0 cm in length. No focal lesion or hydronephrosis.     The visualized aorta is within normal limits. The visualized IVC is within  normal limits.     No ascites.     IMPRESSION  IMPRESSION:      No significant hepatic, biliary, or renal abnormality       Medications list Personally Reviewed   [x]      Yes     []               No    Thank you for allowing us to participate in the care this patient. We will follow patient with you. Signed By: Doe Card MD  [address] [facility]  [facility] [address] [facility]  [address] [facility] [address], [address]  Phone - [phone]         Fax - [phone] [facility] Office  [address] [address]  Phone - [phone]        Fax - [phone]     www. [facility]

## 2019-09-05 NOTE — ED TRIAGE NOTES
Pt comes in with c/o of bilateral hand pain and \"locking\" sensation for over 5 months. Pt reports he is a diabetic and his sugar has been in the 300's.

## 2019-09-05 NOTE — PROGRESS NOTES
Physical Therapy    Received PT eval order. RN ok'd pt for session but states that pt has been up ad román w/o any issues. Arrived at room and pt noted to be amb independently in room w/o device and w/o issues. Able to use urinal and noted to do well stooping to place urinal on floor. Pt states that he went to hospital with main c/o hand cramping but no mobility issues. He states now cramping is gone and he feels he continues to be at his independent baseline. He lives in a home with his mother. It is one story with 5+ step entry with rails but pt states no need for use of rails. No current or d/c PT needs identified. Will complete order. Please re-consult if situation changes.     Drena Canavan, PT

## 2019-09-05 NOTE — PROGRESS NOTES
Oncology End of Shift Note      Bedside shift change report given to Channing Home, RN (incoming nurse) by Inna Pacheco (outgoing nurse) on Harp Crystal. Report included the following information SBAR, Kardex and MAR. Shift Summary:   Patient admitted as a direct admit from [facility]. No complaints of pain. Labs completed, and consults called. Patient vitals and blood sugars assessed. Patient refused a patient gown. Issues for Physician to Address:       Patient on Cardiac Monitoring?     [] Yes  [x] No    Rhythm:          Shift Events        Inna Pacheco

## 2019-09-05 NOTE — ED NOTES
Pt resting in bed awaiting transport to [facility], pt appears to be in no distress, will continue to monitor pt.

## 2019-09-05 NOTE — PROGRESS NOTES
TRANSFER - IN REPORT:    Verbal report received from  [facility] on Tello Oswald  being received from  [facility](unit) for routine progression of care      Report consisted of patients Situation, Background, Assessment and   Recommendations(SBAR). Information from the following report(s) SBAR, Intake/Output and Quality Measures was reviewed with the receiving nurse. Opportunity for questions and clarification was provided. Assessment completed upon patients arrival to unit and care assumed.

## 2019-09-05 NOTE — ED NOTES
Pt in ED w/ complaint of bilateral hand cramping that occurred today. Pt admits that he has been working in the sun and not drinking enough fluids. Pt reports that these symptoms has happened before but not for this long. Pt is A&O X 4 and appears to be in no distress. Emergency Department Nursing Plan of Care       The Nursing Plan of Care is developed from the Nursing assessment and Emergency Department Attending provider initial evaluation. The plan of care may be reviewed in the ED Provider note.     The Plan of Care was developed with the following considerations:   Patient / Family readiness to learn indicated by:verbalized understanding  Persons(s) to be included in education: patient  Barriers to Learning/Limitations:No    Signed     Sybil Bello RN    9/5/2019   4:22 AM

## 2019-09-05 NOTE — PROGRESS NOTES
Oncology End of Shift Note      Bedside shift change report given to Krissy Bright RN (incoming nurse) by Gordy Bush (outgoing nurse) on Fawad Severe. Report included the following information SBAR, Kardex and MAR. Shift Summary: Resumed care of patient from [place]. New bag of fluids was hung. All medications were given. Urine labs were sent down to the lab. Issues for Physician to Address:  None. Patient on Cardiac Monitoring?     [] Yes  [x] No    Rhythm:            Gordy Bush

## 2019-09-06 VITALS
OXYGEN SATURATION: 97 % | SYSTOLIC BLOOD PRESSURE: 141 MMHG | TEMPERATURE: 98.5 F | HEIGHT: 72 IN | DIASTOLIC BLOOD PRESSURE: 73 MMHG | RESPIRATION RATE: 18 BRPM | BODY MASS INDEX: 23.19 KG/M2 | HEART RATE: 65 BPM

## 2019-09-06 PROBLEM — Z72.0 TOBACCO ABUSE: Status: ACTIVE | Noted: 2019-09-06

## 2019-09-06 PROBLEM — F10.10 ETOH ABUSE: Status: ACTIVE | Noted: 2019-09-06

## 2019-09-06 LAB
ALBUMIN SERPL ELPH-MCNC: 3.3 G/DL (ref 2.9–4.4)
ALBUMIN SERPL-MCNC: 2.8 G/DL (ref 3.5–5)
ALBUMIN/GLOB SERPL: 0.8 {RATIO} (ref 1.1–2.2)
ALBUMIN/GLOB SERPL: 1 {RATIO} (ref 0.7–1.7)
ALP SERPL-CCNC: 50 U/L (ref 45–117)
ALPHA1 GLOB SERPL ELPH-MCNC: 0.2 G/DL (ref 0–0.4)
ALPHA2 GLOB SERPL ELPH-MCNC: 0.9 G/DL (ref 0.4–1)
ALT SERPL-CCNC: 121 U/L (ref 12–78)
ANION GAP SERPL CALC-SCNC: 7 MMOL/L (ref 5–15)
AST SERPL-CCNC: 113 U/L (ref 15–37)
B-GLOBULIN SERPL ELPH-MCNC: 0.9 G/DL (ref 0.7–1.3)
BILIRUB SERPL-MCNC: 0.4 MG/DL (ref 0.2–1)
BUN SERPL-MCNC: 28 MG/DL (ref 6–20)
BUN/CREAT SERPL: 22 (ref 12–20)
CALCIUM SERPL-MCNC: 8.2 MG/DL (ref 8.5–10.1)
CHLORIDE SERPL-SCNC: 108 MMOL/L (ref 97–108)
CO2 SERPL-SCNC: 24 MMOL/L (ref 21–32)
CREAT SERPL-MCNC: 1.28 MG/DL (ref 0.7–1.3)
ERYTHROCYTE [DISTWIDTH] IN BLOOD BY AUTOMATED COUNT: 13.2 % (ref 11.5–14.5)
GAMMA GLOB SERPL ELPH-MCNC: 1.5 G/DL (ref 0.4–1.8)
GLOBULIN SER CALC-MCNC: 3.6 G/DL (ref 2–4)
GLOBULIN SER-MCNC: 3.6 G/DL (ref 2.2–3.9)
GLUCOSE BLD STRIP.AUTO-MCNC: 119 MG/DL (ref 65–100)
GLUCOSE BLD STRIP.AUTO-MCNC: 180 MG/DL (ref 65–100)
GLUCOSE SERPL-MCNC: 149 MG/DL (ref 65–100)
HAV IGM SER QL: NONREACTIVE
HBV CORE IGM SER QL: NONREACTIVE
HBV SURFACE AG SER QL: <0.1 INDEX
HBV SURFACE AG SER QL: NEGATIVE
HCT VFR BLD AUTO: 38.6 % (ref 36.6–50.3)
HCV AB SER IA-ACNC: >11 INDEX
HCV AB SERPL QL IA: REACTIVE
HCV COMMENT,HCGAC: ABNORMAL
HGB BLD-MCNC: 12.6 G/DL (ref 12.1–17)
IGA SERPL-MCNC: 116 MG/DL (ref 61–437)
IGG SERPL-MCNC: 1542 MG/DL (ref 700–1600)
IGM SERPL-MCNC: 118 MG/DL (ref 20–172)
INTERPRETATION SERPL IEP-IMP: ABNORMAL
KAPPA LC FREE SER-MCNC: 106.5 MG/L (ref 3.3–19.4)
KAPPA LC FREE/LAMBDA FREE SER: 2.32 {RATIO} (ref 0.26–1.65)
LAMBDA LC FREE SERPL-MCNC: 46 MG/L (ref 5.7–26.3)
M PROTEIN SERPL ELPH-MCNC: ABNORMAL G/DL
MAGNESIUM SERPL-MCNC: 1.8 MG/DL (ref 1.6–2.4)
MCH RBC QN AUTO: 27.9 PG (ref 26–34)
MCHC RBC AUTO-ENTMCNC: 32.6 G/DL (ref 30–36.5)
MCV RBC AUTO: 85.4 FL (ref 80–99)
NRBC # BLD: 0 K/UL (ref 0–0.01)
NRBC BLD-RTO: 0 PER 100 WBC
PHOSPHATE SERPL-MCNC: 3.4 MG/DL (ref 2.6–4.7)
PLATELET # BLD AUTO: 129 K/UL (ref 150–400)
PMV BLD AUTO: 10.4 FL (ref 8.9–12.9)
POTASSIUM SERPL-SCNC: 3.4 MMOL/L (ref 3.5–5.1)
PROT SERPL-MCNC: 6.4 G/DL (ref 6.4–8.2)
PROT SERPL-MCNC: 6.9 G/DL (ref 6–8.5)
RBC # BLD AUTO: 4.52 M/UL (ref 4.1–5.7)
SERVICE CMNT-IMP: ABNORMAL
SERVICE CMNT-IMP: ABNORMAL
SODIUM SERPL-SCNC: 139 MMOL/L (ref 136–145)
SP1: ABNORMAL
SP2: ABNORMAL
SP3: ABNORMAL
WBC # BLD AUTO: 3 K/UL (ref 4.1–11.1)

## 2019-09-06 PROCEDURE — 99218 HC RM OBSERVATION: CPT

## 2019-09-06 PROCEDURE — 94760 N-INVAS EAR/PLS OXIMETRY 1: CPT

## 2019-09-06 PROCEDURE — 84100 ASSAY OF PHOSPHORUS: CPT

## 2019-09-06 PROCEDURE — 74011636637 HC RX REV CODE- 636/637: Performed by: HOSPITALIST

## 2019-09-06 PROCEDURE — 83520 IMMUNOASSAY QUANT NOS NONAB: CPT

## 2019-09-06 PROCEDURE — 82962 GLUCOSE BLOOD TEST: CPT

## 2019-09-06 PROCEDURE — 83735 ASSAY OF MAGNESIUM: CPT

## 2019-09-06 PROCEDURE — 74011250636 HC RX REV CODE- 250/636: Performed by: HOSPITALIST

## 2019-09-06 PROCEDURE — 86225 DNA ANTIBODY NATIVE: CPT

## 2019-09-06 PROCEDURE — 86160 COMPLEMENT ANTIGEN: CPT

## 2019-09-06 PROCEDURE — 96372 THER/PROPH/DIAG INJ SC/IM: CPT

## 2019-09-06 PROCEDURE — 74011250637 HC RX REV CODE- 250/637: Performed by: NURSE PRACTITIONER

## 2019-09-06 PROCEDURE — 74011250637 HC RX REV CODE- 250/637: Performed by: HOSPITALIST

## 2019-09-06 PROCEDURE — 86038 ANTINUCLEAR ANTIBODIES: CPT

## 2019-09-06 PROCEDURE — 85027 COMPLETE CBC AUTOMATED: CPT

## 2019-09-06 PROCEDURE — 36415 COLL VENOUS BLD VENIPUNCTURE: CPT

## 2019-09-06 PROCEDURE — 80053 COMPREHEN METABOLIC PANEL: CPT

## 2019-09-06 RX ORDER — ASPIRIN 325 MG/1
100 TABLET, FILM COATED ORAL DAILY
Qty: 30 TAB | Refills: 0 | Status: SHIPPED | COMMUNITY
Start: 2019-09-07 | End: 2022-05-18 | Stop reason: SDUPTHER

## 2019-09-06 RX ORDER — IBUPROFEN 200 MG
1 TABLET ORAL DAILY
Qty: 30 PATCH | Refills: 0 | Status: SHIPPED | OUTPATIENT
Start: 2019-09-07 | End: 2019-10-07

## 2019-09-06 RX ORDER — FOLIC ACID 1 MG/1
1 TABLET ORAL DAILY
Qty: 30 TAB | Refills: 0 | Status: SHIPPED | COMMUNITY
Start: 2019-09-07 | End: 2022-05-18 | Stop reason: SDUPTHER

## 2019-09-06 RX ORDER — POTASSIUM CHLORIDE 750 MG/1
40 TABLET, FILM COATED, EXTENDED RELEASE ORAL
Status: COMPLETED | OUTPATIENT
Start: 2019-09-06 | End: 2019-09-06

## 2019-09-06 RX ADMIN — HEPARIN SODIUM 5000 UNITS: 5000 INJECTION INTRAVENOUS; SUBCUTANEOUS at 00:59

## 2019-09-06 RX ADMIN — INSULIN LISPRO 2 UNITS: 100 INJECTION, SOLUTION INTRAVENOUS; SUBCUTANEOUS at 11:43

## 2019-09-06 RX ADMIN — POTASSIUM CHLORIDE 40 MEQ: 750 TABLET, EXTENDED RELEASE ORAL at 09:19

## 2019-09-06 RX ADMIN — Medication 10 ML: at 15:14

## 2019-09-06 RX ADMIN — MULTIPLE VITAMINS W/ MINERALS TAB 1 TABLET: TAB at 09:12

## 2019-09-06 RX ADMIN — HEPARIN SODIUM 5000 UNITS: 5000 INJECTION INTRAVENOUS; SUBCUTANEOUS at 09:12

## 2019-09-06 RX ADMIN — LINAGLIPTIN 5 MG: 5 TABLET, FILM COATED ORAL at 09:12

## 2019-09-06 RX ADMIN — SODIUM CHLORIDE 150 ML/HR: 900 INJECTION, SOLUTION INTRAVENOUS at 05:33

## 2019-09-06 RX ADMIN — FOLIC ACID 1 MG: 1 TABLET ORAL at 09:11

## 2019-09-06 RX ADMIN — Medication 100 MG: at 09:16

## 2019-09-06 NOTE — PROGRESS NOTES
Oncology End of Shift Note      Bedside shift change report given to Pamela Fry RN (incoming nurse) by Adrienne Perkins RN (outgoing nurse) on Olivia Morton. Report included the following information SBAR, Kardex, Intake/Output, MAR and Recent Results. Shift Summary: Pt slept through night. Urinated in urinal-- good output. Issues for Physician to Address: Am labs     Patient on Cardiac Monitoring?     [] Yes  [x] No    Rhythm:          Shift Events        Adrienne ePrkins RN

## 2019-09-06 NOTE — PROGRESS NOTES
Reason for Admission:   ASHLEY; Dehydration; Diabetes; Renal Failure                  RRAT Score:    15             Do you (patient/family) have any concerns for transition/discharge? Pt voiced he's currently unemployed. SW contacted the financial screener (Edgar Velasquez x [phone]). Pt to be assessed by Edgar Velasquez. Plan for utilizing home health:   No prior HH in the past.  SNF in the past for knee surgery in the past.      Current Advanced Directive/Advance Care Plan:  Full code. No Advanced Medical Directive. Patient voiced his daughter Sofie Fortune will be his primary decision maker. Pt has five adult children. Transition of Care Plan:  Pt to discharge home w/family. Pt aware to follow up w/PCP as indicated on AVS.      SW to continue to assist.    Care Management Interventions  PCP Verified by CM: Yes(May 9th, 2019)  Mode of Transport at Discharge:  Other (see comment)(Family)  Transition of Care Consult (CM Consult): Discharge Planning  Discharge Durable Medical Equipment: (No DME or O2.)  Current Support Network: Relative's Home(Ranch style home w/eight steps to enter the front door.)  Confirm Follow Up Transport: Family  Plan discussed with Pt/Family/Caregiver: Yes  Discharge Location  Discharge Placement: (Home)      [place] Mabry  1989

## 2019-09-06 NOTE — DISCHARGE SUMMARY
Hospitalist Discharge Summary     Patient ID:  Jennifer Holliday  [id number]  62 y.o.  1960 9/5/2019    PCP on record: Tanesha Mar MD    Admit date: 9/5/2019  Discharge date and time: 9/6/2019    DISCHARGE DIAGNOSIS:    MANAS, POA- resolved  Hypercalcemia   Uncontrolled DM  Tobacco abuse  ETOH abuse     CONSULTATIONS:  IP CONSULT TO NEPHROLOGY    Excerpted HPI from H&P of Rossy Herr MD:  Qian Floyd is a 62 y.o.  male  Transferred from [facility] for manas. Restarted meds this Monday after not taking for 2 months. Having abdominal cramping, loose frequent stools. Came to ER due to severe hand cramping. Painting outside all day yesterday but did drink large bottle of water. \"  ______________________________________________________________________  DISCHARGE SUMMARY/HOSPITAL COURSE:  for full details see H&P, daily progress notes, labs, consult notes. Stefano De Luna y.o. male was admitted to [facility] on 9/5/2019 and treated for the following medical complaints:   MANAS, POA- resolved  Hypercalcemia likely d/t dehydration- resolved  · Received IVF  · Abdominal US shows no significant hepatic, biliary, or renal abnormality  · Appreciate nephrology input    Uncontrolled DM2  · Hgb A1c 8.9 in 05/2019  · Restarted medication 2 months ago after not taking medication d/t finacial reasons  · Metformin held d/t MANAS  · Amaryl held d/t MANAS  · Continued Januvia  · SSI    Elevated LFTs ALT>AST  · Hepatitis panel sent, results pending   · Abdominal US shows no significant hepatic, biliary, or renal abnormality  · , ; improving   · Follow-up with GI outpatient     Tobacco abuse   · CXR negative   · Duoneb q 6 hours prn  · Nicotine patch provided  · Patient educated on the importance of smoking cessation and the health benefits associated with quitting. Alcohol use/abuse  · Reports consuming 3-4 beers per day and vodka 3 times per week.   · Denies hx of withdrawal  · CIWA  · Ativan PRN  · Thiamin, MVI, folic acid    Patient's plan of care has been reviewed with them. Patient and/or family have verbally conveyed their understanding and agreement of the patient's signs, symptoms, diagnosis, treatment and prognosis and additionally agree to follow up as recommended or return to [facility] should their condition change prior to follow-up. Discharge instructions have also been provided to the patient with some educational information regarding their diagnosis as well a list of reasons why they would want to return to the office prior to their follow-up appointment should their condition change. Harlan Pickering to avoid frequent ED visits. [facility] can treat; pains, sprains, cuts, wounds, high fevers, upper respiratory infections and much more. There medical team is equipped with all the tools necessary to provide advanced medical care in the comfort of you home, workplace, or location of need. The medical team consists of doctors, nurse practitioners, and EMTs. [facility] is available 7 days per week 9 am to 9 pm.   Request care by calling [phone] or by going online at [employer]  ______________________________________________________________________  Patient seen and examined by me on discharge day. Pertinent Findings:  Gen:    Not in distress  Chest: Clear lungs  CVS:   Regular rhythm. No edema  Abd:  Soft, not distended, not tender  Neuro:  Alert, oriented   _______________________________________________________________________  DISCHARGE MEDICATIONS:   Current Discharge Medication List      START taking these medications    Details   folic acid (FOLVITE) 1 mg tablet Take 1 Tab by mouth daily. Qty: 30 Tab, Refills: 0      nicotine (NICODERM CQ) 14 mg/24 hr patch 1 Patch by TransDERmal route daily for 30 days.   Qty: 30 Patch, Refills: 0      multivitamin, tx-iron-ca-min (THERA-M W/ IRON) 9 mg iron-400 mcg tab tablet Take 1 Tab by mouth daily. Qty: 30 Tab, Refills: 0      thiamine mononitrate (B-1) 100 mg tablet Take 1 Tab by mouth daily. Qty: 30 Tab, Refills: 0         CONTINUE these medications which have NOT CHANGED    Details   naproxen sodium (ALEVE) 220 mg tablet Take 220 mg by mouth daily as needed. SITagliptin (JANUVIA) 50 mg tablet Take 1 Tab by mouth daily. Qty: 30 Tab, Refills: 5    Associated Diagnoses: Type 2 diabetes mellitus without complication, without long-term current use of insulin (HCC)      lisinopril (PRINIVIL, ZESTRIL) 10 mg tablet Take 1 Tab by mouth daily. Qty: 30 Tab, Refills: 5    Associated Diagnoses: Essential hypertension      tadalafil (CIALIS) 5 mg tablet Take 1 Tab by mouth daily as needed (ED). Qty: 10 Tab, Refills: 0    Associated Diagnoses: Other male erectile dysfunction      metFORMIN (GLUCOPHAGE) 1,000 mg tablet Take 1 Tab by mouth two (2) times daily (with meals). Qty: 180 Tab, Refills: 2      glimepiride (AMARYL) 2 mg tablet Take 1 Tab by mouth every morning. Qty: 90 Tab, Refills: 2      ergocalciferol (ERGOCALCIFEROL) 50,000 unit capsule Take 1 Cap by mouth every seven (7) days. Qty: 4 Cap, Refills: 3    Associated Diagnoses: Vitamin D deficiency      diclofenac (VOLTAREN) 1 % gel Apply  to affected area two (2) times daily as needed for Pain. knee  Qty: 100 g, Refills: 2    Associated Diagnoses: Arthritis of knee      acetaminophen (TYLENOL) 500 mg tablet Take 1 Tab by mouth two (2) times daily as needed for Pain. Qty: 60 Tab, Refills: 5    Associated Diagnoses: Arthritis of knee      glucose blood VI test strips (BLOOD GLUCOSE TEST) strip Check BG 1-2 times/day  Qty: 100 Strip, Refills: 11      Blood-Glucose Meter (RELION ALL-IN-ONE METER) monitoring kit Check BG 1-2 times/day  Qty: 1 Kit, Refills: 0               Patient Follow Up Instructions:    Activity: Activity as tolerated  Diet: Diabetic Diet  Wound Care: None needed    Follow-up with PCP in 1 week.     Follow-up Information     Follow up With Specialties Details Why Contact Info    Jesu Dawn MD Internal Medicine Schedule an appointment as soon as possible for a visit  [address]  [address] [phone]      [facility]  Schedule an appointment as soon as possible for a visit Elevated liver enzymes  [address] [facility] [address] [facility] [address] [address]        ________________________________________________________________    Risk of deterioration: Low    Condition at Discharge:  Stable  __________________________________________________________________    Disposition  Home with family, no needs    ____________________________________________________________________    Code Status: Full Code  ___________________________________________________________________      Total time in minutes spent coordinating this discharge (includes going over instructions, follow-up, prescriptions, and preparing report for sign off to her PCP) :  31 minutes    Signed:  Reta Aldrich NP

## 2019-09-06 NOTE — DISCHARGE INSTRUCTIONS
HOSPITALIST DISCHARGE INSTRUCTIONS    NAME: Elise Tyson   :  1960   MRN:  [id number]     Date/Time:  2019 9:19 AM    ADMIT DATE: 2019   DISCHARGE DATE: 2019     Attending Physician: Grace Preston NP    DISCHARGE DIAGNOSIS:    ASHLEY, POA- resolved  Hypercalcemia   Uncontrolled DM  Tobacco abuse  ETOH abuse     Medications: Per above medication reconciliation. Pain Management: per above medications    Recommended diet: Diabetic Diet    Recommended activity: Activity as tolerated    Wound care: None    Indwelling devices:  None    Supplemental Oxygen: None    Code status: Full        Outside physician follow up: Follow-up Information     Follow up With Specialties Details Why Contact Info    Alfred Delaney MD Internal Medicine Schedule an appointment as soon as possible for a visit  [address]  [address] [facility]  [phone]            Information obtained by :  I understand that if any problems occur once I am at home I am to contact my physician. I understand and acknowledge receipt of the instructions indicated above.                                                                                                                                            Physician's or R.N.'s Signature                                                                  Date/Time                                                                                                                                              Patient or Repres

## 2019-09-06 NOTE — PROGRESS NOTES
Nephrology Progress Note     Brett Jc     www. [facility]                  Phone - [phone]   Patient: Anatoly Land   Date- 9/6/2019        Admit Date: 9/5/2019  YOB: 1960             CC: Follow up for ASHLEY         Subjective: Interval History:   -  Cr. Much better  k low  No more cramps    ROS:- as above   Assessment & Plan:     · ASHLEY likely due to multiple factors  1 Lisinopril us  2 alevel use  3 GN can't be rule out - he has proteinuria and hematuria. 4 post renal obstruction - less likely.  - STOP IVF THIS AFTERNOON  - hold lisinopril  - avoid nsaids/ aleve  - d/w patient okay to restart lisinopril next week  - OKAY TO D/C - renal stand point. Follow up labs in 1 week    HYPOKALEMIA  kcl 40 meq po    PROTEINURIA  Urine pr./cr pending    DM 2    Current smoker    ETOH abuse    Physical Exam:   GEN: NAD  NECK- Supple, no mass  RESP: Clear b/l, no wheezing, No accessory muscle use  CVS: RRR,S1,S2    ABDO: soft , non tender, No mass  EXT: No Edema   NEURO: normal speech, non focal    Care Plan discussed with: patient  Objective:   Visit Vitals  /67 (BP 1 Location: Left arm, BP Patient Position: At rest)   Pulse 63   Temp 98.1 °F (36.7 °C)   Resp 18   Ht 6' (1.829 m)   SpO2 98%   BMI 23.19 kg/m²     There were no vitals filed for this visit. 09/04 1901 - 09/06 0700  In: 3085 [I.V.:3085]  Out: 1400 [Urine:1400]    Intake/Output Summary (Last 24 hours) at 9/6/2019 0828  Last data filed at 9/6/2019 0555  Gross per 24 hour   Intake 3085 ml   Output 1400 ml   Net 1685 ml      Chart reviewed. Pertinent Notes reviewed.        Medication list  reviewed   Current Facility-Administered Medications   Medication    potassium chloride SR (KLOR-CON 10) tablet 40 mEq    sodium chloride (NS) flush 5-40 mL    sodium chloride (NS) flush 5-40 mL    0.9% sodium chloride infusion    acetaminophen (TYLENOL) tablet 650 mg    ondansetron (ZOFRAN) injection 4 mg  heparin (porcine) injection 5,000 Units    insulin lispro (HUMALOG) injection    glucose chewable tablet 16 g    glucagon (GLUCAGEN) injection 1 mg    dextrose 10% infusion 0-250 mL    linaGLIPtin (TRADJENTA) tablet 5 mg    albuterol-ipratropium (DUO-NEB) 2.5 MG-0.5 MG/3 ML    LORazepam (ATIVAN) injection 1 mg    LORazepam (ATIVAN) injection 2 mg    folic acid (FOLVITE) tablet 1 mg    multivitamin, tx-iron-ca-min (THERA-M w/ IRON) tablet 1 Tab    nicotine (NICODERM CQ) 14 mg/24 hr patch 1 Patch    thiamine mononitrate (B-1) tablet 100 mg           Data Review :  Recent Labs     09/06/19  0406 09/05/19 0136    135*   K 3.4* 3.7    99   CO2 24 25   BUN 28* 35*   CREA 1.28 3.62*   * 253*   CA 8.2* 10.4*   MG 1.8 1.8   PHOS 3.4  --      Recent Labs     09/06/19  0406 09/05/19 0136   WBC 3.0* 5.8   HGB 12.6 14.9   HCT 38.6 45.6   * 171     No results for input(s): FE, TIBC, PSAT, FERR in the last 72 hours.    Recent Labs     09/05/19  0245        Lab Results   Component Value Date/Time    Color DARK YELLOW 09/05/2019 01:36 AM    Appearance TURBID (A) 09/05/2019 01:36 AM    Specific gravity 1.025 09/05/2019 01:36 AM    pH (UA) 5.0 09/05/2019 01:36 AM    Protein 100 (A) 09/05/2019 01:36 AM    Glucose 100 (A) 09/05/2019 01:36 AM    Ketone TRACE (A) 09/05/2019 01:36 AM    Urobilinogen 1.0 09/05/2019 01:36 AM    Nitrites NEGATIVE  09/05/2019 01:36 AM    Leukocyte Esterase SMALL (A) 09/05/2019 01:36 AM    Epithelial cells FEW 09/05/2019 01:36 AM    Bacteria NEGATIVE  09/05/2019 01:36 AM    WBC 0-4 09/05/2019 01:36 AM    RBC 0-5 09/05/2019 01:36 AM     Lab Results   Component Value Date/Time    Culture result: NORMAL RESPIRATORY YOUNG/NO BETA STREP ISOLATED 10/16/2018 07:01 PM    Culture result: NORMAL RESPIRATORY YOUNG/NO BETA STREP ISOLATED 05/05/2017 08:59 PM    Culture result: NORMAL RESPIRATORY YOUNG/NO BETA STREP ISOLATED 01/12/2017 12:18 PM     No results found for: SDES  Lab Results   Component Value Date/Time    Sodium,urine random 153 09/05/2019 06:25 PM    Creatinine, urine 121.00 09/05/2019 06:25 PM       Results from Hospital Encounter encounter on 09/05/19   XR CHEST PA LAT    Narrative History: Shortness of breath and smoking. Frontal and lateral views of the chest show clear lungs. The heart, mediastinum  and pulmonary vasculature are normal.  The bony thorax is unremarkable. Impression IMPRESSION:  NORMAL CHEST. Son Mendoza MD  [facility]   [email or web address]  [facility]  [address], [address], [address]  Phone - [phone]         Fax - [phone]

## 2019-09-06 NOTE — PROGRESS NOTES
Problem: Falls - Risk of  Goal: *Absence of Falls  Description  Document Kiel Anderson Fall Risk and appropriate interventions in the flowsheet.   Outcome: Progressing Towards Goal  Note:   Fall Risk Interventions:            Medication Interventions: Patient to call before getting OOB

## 2019-09-06 NOTE — PROGRESS NOTES
Financial screener (Susi Davila [phone]) met w/patient and provided the Care Card application. Financial screener explained the Care Card only applies to [address]. Instructed to provide supporting documents as required.     Rebecca Tena, MSW  [phone]

## 2019-09-07 LAB
ANA SER QL: NEGATIVE
C3 SERPL-MCNC: 96 MG/DL (ref 82–167)
C4 SERPL-MCNC: 25 MG/DL (ref 14–44)
DSDNA AB SER-ACNC: <1 IU/ML (ref 0–9)

## 2019-09-08 LAB
C-ANCA TITR SER IF: NORMAL TITER
MYELOPEROXIDASE AB SER IA-ACNC: <9 U/ML (ref 0–9)
P-ANCA ATYPICAL TITR SER IF: NORMAL TITER
P-ANCA TITR SER IF: NORMAL TITER
PROTEINASE3 AB SER IA-ACNC: <3.5 U/ML (ref 0–3.5)

## 2019-09-11 ENCOUNTER — OFFICE VISIT (OUTPATIENT)
Dept: INTERNAL MEDICINE CLINIC | Age: 59
End: 2019-09-11

## 2019-09-11 VITALS
TEMPERATURE: 97.8 F | BODY MASS INDEX: 23.98 KG/M2 | OXYGEN SATURATION: 97 % | SYSTOLIC BLOOD PRESSURE: 100 MMHG | WEIGHT: 177 LBS | HEIGHT: 72 IN | RESPIRATION RATE: 22 BRPM | HEART RATE: 88 BPM | DIASTOLIC BLOOD PRESSURE: 54 MMHG

## 2019-09-11 DIAGNOSIS — N28.9 RENAL INSUFFICIENCY: ICD-10-CM

## 2019-09-11 DIAGNOSIS — I10 ESSENTIAL HYPERTENSION: ICD-10-CM

## 2019-09-11 DIAGNOSIS — E11.9 TYPE 2 DIABETES MELLITUS WITHOUT COMPLICATION, WITHOUT LONG-TERM CURRENT USE OF INSULIN (HCC): Primary | ICD-10-CM

## 2019-09-11 DIAGNOSIS — Z23 ENCOUNTER FOR IMMUNIZATION: ICD-10-CM

## 2019-09-11 RX ORDER — METFORMIN HYDROCHLORIDE 1000 MG/1
500 TABLET ORAL 2 TIMES DAILY WITH MEALS
Qty: 60 TAB | Refills: 2
Start: 2019-09-11 | End: 2019-09-18 | Stop reason: SDUPTHER

## 2019-09-11 RX ORDER — LISINOPRIL 10 MG/1
5 TABLET ORAL DAILY
Qty: 15 TAB | Refills: 5
Start: 2019-09-11 | End: 2019-09-19 | Stop reason: DRUGHIGH

## 2019-09-11 RX ORDER — GLIPIZIDE 5 MG/1
5 TABLET ORAL 2 TIMES DAILY
Qty: 60 TAB | Refills: 3 | Status: SHIPPED | OUTPATIENT
Start: 2019-09-11 | End: 2019-09-18 | Stop reason: ALTCHOICE

## 2019-09-11 NOTE — PROGRESS NOTES
Marcell Michael is a 62 y.o. male  who presents for routine immunizations. She denies any symptoms , reactions or allergies that would exclude them from being immunized today. Risks and adverse reactions were discussed and the VIS was given to them. All questions were addressed. She was observed for 10 min post injection. There were no reactions observed.     Ian lLanes LPN

## 2019-09-11 NOTE — PROGRESS NOTES
Health Maintenance Due   Topic Date Due    EYE EXAM RETINAL OR DILATED  08/11/1968    DTaP/Tdap/Td series (1 - Tdap) 08/11/1979    Shingrix Vaccine Age 50> (1 of 2) 08/11/2008    FOBT Q 1 YEAR AGE 50-75  08/11/2008    Pneumococcal 0-64 years (1 of 1 - PPSV23) 07/04/2019    Influenza Age 5 to Adult  08/01/2019       Chief Complaint   Patient presents with   Swapnil Banerjee ED Follow-up     [facility]- 9/4/2019   [facility] Follow Up     ED [facility]- 9/5/2019 to 9/6/2019    Dehydration    Spasms     In hands    Blood sugar problem     BS has been as high as 305       1. Have you been to the ER, urgent care clinic since your last visit? Hospitalized since your last visit? Yes When: 9/4/2019 [facility] ED, dehydration and 9/5/2019 to 9/6/2019 [facility] for same    2. Have you seen or consulted any other health care providers outside of the [address] [place] [address] since your last visit? Include any pap smears or colon screening. No    3) Do you have an Advance Directive on file? no    4) Are you interested in receiving information on Advance Directives? NO      Patient is accompanied by self I have received verbal consent from Karlie Cool to discuss any/all medical information while they are present in the room.

## 2019-09-11 NOTE — PROGRESS NOTES
HISTORY OF PRESENT ILLNESS  Amandeep Portillo is a 62 y.o. male here to hospital follow-up. He was admitted to hospitals with acute kidney failure. Has DJD in both knee. Had arthroscopy done on the left side. Pain is mildly better. Since his renal insufficiency, he was told to stop his metformin and Amaryl first.  He has restarted back on both. Blood sugar still running 2 . He is worried about that. He a.m. I have given him Januvia, he is unable to afford it. All labs reviewed, imaging reviewed. Medications are reconciled. Labs reviewed, last lab shows microalbuminuria. Need flu shot and pneumonia vaccine. ED Follow-up     Hospital Follow Up     Blood sugar problem         Review of Systems   Constitutional: Negative. HENT: Negative. Eyes: Negative. Respiratory: Negative. Cardiovascular: Negative. Gastrointestinal: Negative. Genitourinary: Negative. Musculoskeletal: Positive for joint pain and muscle spasms. Skin: Negative. Neurological: Negative. Endo/Heme/Allergies: Negative. Psychiatric/Behavioral: Negative. Physical Exam   Constitutional: He appears well-developed and well-nourished. No distress. Neck: Normal range of motion. Neck supple. No JVD present. No thyromegaly present. Cardiovascular: Normal rate, regular rhythm, normal heart sounds and intact distal pulses. Pulmonary/Chest: Effort normal and breath sounds normal. No respiratory distress. He has no wheezes. Musculoskeletal: He exhibits tenderness. He exhibits no edema. B/L knee: Warm to touch. Joint crepitus present. Range of motion mildly restricted. Tenderness in the joint. ASSESSMENT and PLAN  Diagnoses and all orders for this visit:    1. Type 2 diabetes mellitus without complication, without long-term current use of insulin ([address]    He is on metformin thousand mill grams twice a day and Amaryl 2 mg once a day. Has recent acute renal failure.   He has started back on metformin thousand mill grams twice a day I have advised him to take metformin 500 mg twice a day and will stop Amaryl. Will change Amaryl to glipizide 5 mg twice a day because this is kidney friendly. He is not able to afford Januvia. We will switch to Jardiance. His A1c was uncontrolled his blood sugar running 2 . Advised him to be an ADA diet and exercise. With new medication regimen I would like him to have a blood work in 2 weeks and follow-up with me in 3 weeks. -     empagliflozin (JARDIANCE) 10 mg tablet; Take 1 Tab by mouth daily. -     linaGLIPtin (TRADJENTA) 5 mg tablet; Take 1 Tab by mouth daily. DC januvia  -     glipiZIDE (GLUCOTROL) 5 mg tablet; Take 1 Tab by mouth two (2) times a day. DC amaryl  -     metFORMIN (GLUCOPHAGE) 1,000 mg tablet; Take 0.5 Tabs by mouth two (2) times daily (with meals). 2. Encounter for immunization    Will give,  -     INFLUENZA VIRUS VAC QUAD,SPLIT,PRESV FREE SYRINGE IM  -     PNEUMOCOCCAL CONJ VACCINE 13 VALENT IM  -     KS IMMUNIZ ADMIN,1 SINGLE/COMB VAC/TOXOID    3. Essential hypertension    Blood pressure is low normal.  Since he has kidney insufficiency. Will reduce lisinopril to half tablet a day. -     lisinopril (PRINIVIL, ZESTRIL) 10 mg tablet; Take 0.5 Tabs by mouth daily. 4. Renal insufficiency    Need to drink more fluid. -     metFORMIN (GLUCOPHAGE) 1,000 mg tablet; Take 0.5 Tabs by mouth two (2) times daily (with meals). -     lisinopril (PRINIVIL, ZESTRIL) 10 mg tablet; Take 0.5 Tabs by mouth daily.  -     METABOLIC PANEL, COMPREHENSIVE;  Future          Issues reviewed with her: referral to Diabetic Education department, diabetic diet discussed in detail, written exchange diet given, home glucose monitoring emphasized, all medications, side effects and compliance discussed carefully, foot care discussed and Podiatry visits discussed, annual eye examinations at Ophthalmology discussed, long term diabetic complications discussed and labs immediately prior to next visit.

## 2019-09-11 NOTE — PATIENT INSTRUCTIONS

## 2019-09-18 ENCOUNTER — OFFICE VISIT (OUTPATIENT)
Dept: ENDOCRINOLOGY | Age: 59
End: 2019-09-18

## 2019-09-18 VITALS
WEIGHT: 180 LBS | BODY MASS INDEX: 24.38 KG/M2 | HEART RATE: 66 BPM | OXYGEN SATURATION: 99 % | HEIGHT: 72 IN | DIASTOLIC BLOOD PRESSURE: 72 MMHG | RESPIRATION RATE: 16 BRPM | SYSTOLIC BLOOD PRESSURE: 144 MMHG

## 2019-09-18 DIAGNOSIS — N28.9 RENAL INSUFFICIENCY: ICD-10-CM

## 2019-09-18 DIAGNOSIS — E11.9 TYPE 2 DIABETES MELLITUS WITHOUT COMPLICATION, WITHOUT LONG-TERM CURRENT USE OF INSULIN (HCC): Primary | ICD-10-CM

## 2019-09-18 LAB — HBA1C MFR BLD HPLC: 9.8 %

## 2019-09-18 RX ORDER — METFORMIN HYDROCHLORIDE 1000 MG/1
1000 TABLET ORAL 2 TIMES DAILY WITH MEALS
Qty: 60 TAB | Refills: 6 | Status: SHIPPED | OUTPATIENT
Start: 2019-09-18 | End: 2020-07-01 | Stop reason: SDUPTHER

## 2019-09-18 RX ORDER — GLIMEPIRIDE 2 MG/1
2 TABLET ORAL 2 TIMES DAILY
Qty: 60 TAB | Refills: 6 | Status: SHIPPED | OUTPATIENT
Start: 2019-09-18 | End: 2020-07-01 | Stop reason: SDUPTHER

## 2019-09-18 NOTE — PROGRESS NOTES
This is a 69-year-old -American male with a history of type 2 diabetes mellitus x7 years. He has a history of incarceration in the past.  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 Finances have been the major limiting factor to his diabetes management. When Dr. Dusty Cox saw him last in March his A1c was 13%. She got him back on metformin and glimepiride and his A1c in May was 8.9%. His A1c today is 9.8%. He apparently ran out of the medicines for several weeks but is back on the medicine now. He was recently hospitalized at [facility] with dehydration. His creatinine increased to 3.6 but at discharge it was back down to 1.28. He is back on the metformin now that his creatinine has improved. So currently he is on the thousand milligrams of metformin twice daily and glimepiride 2 mg. He uses a [employer] glucose meter and he has blood sugars in the 120-140 range on medication. His diet is reasonable but he does drink a lot of beer. He says generally he drinks somewhere between 3 and 6 beers a day. He tells me that he is try to cut down on his cigarettes. Examination  Blood pressure 144/72  Pulse 66  Weight 180 pounds    Impression type 2 diabetes mellitus on metformin and glimepiride with reasonable blood sugars on this regimen when he takes it. Plan: I strongly encouraged him to take the full dose metformin and to increase his glimepiride to 2 mg twice daily. If he is able and to do this, his next A1c should be significantly improved. Because of finances, I do not think we can find a less expensive regimen for him. We spent more than 25 mintutes face to face, more than 50% was in counseling regarding diet, exercise and carbohydrate intake.

## 2019-09-18 NOTE — PROGRESS NOTES
Lab Results   Component Value Date/Time    Hemoglobin A1c 8.9 (H) 05/09/2019 11:37 AM    Hemoglobin A1c (POC) 13.3 03/11/2019 03:06 PM

## 2019-09-19 ENCOUNTER — OFFICE VISIT (OUTPATIENT)
Dept: INTERNAL MEDICINE CLINIC | Age: 59
End: 2019-09-19

## 2019-09-19 VITALS
HEART RATE: 66 BPM | WEIGHT: 181.2 LBS | DIASTOLIC BLOOD PRESSURE: 78 MMHG | SYSTOLIC BLOOD PRESSURE: 128 MMHG | HEIGHT: 72 IN | RESPIRATION RATE: 17 BRPM | OXYGEN SATURATION: 99 % | TEMPERATURE: 97.8 F | BODY MASS INDEX: 24.54 KG/M2

## 2019-09-19 DIAGNOSIS — I10 ESSENTIAL HYPERTENSION: ICD-10-CM

## 2019-09-19 DIAGNOSIS — M25.562 ACUTE PAIN OF BOTH KNEES: Primary | ICD-10-CM

## 2019-09-19 DIAGNOSIS — E11.9 TYPE 2 DIABETES MELLITUS WITHOUT COMPLICATION, WITHOUT LONG-TERM CURRENT USE OF INSULIN (HCC): ICD-10-CM

## 2019-09-19 DIAGNOSIS — M17.10 ARTHRITIS OF KNEE: ICD-10-CM

## 2019-09-19 DIAGNOSIS — F10.10 ETOH ABUSE: ICD-10-CM

## 2019-09-19 DIAGNOSIS — M25.561 ACUTE PAIN OF BOTH KNEES: Primary | ICD-10-CM

## 2019-09-19 RX ORDER — PREDNISONE 5 MG/1
TABLET ORAL
Qty: 1 PACKAGE | Refills: 0 | Status: SHIPPED | OUTPATIENT
Start: 2019-09-19 | End: 2019-11-06 | Stop reason: ALTCHOICE

## 2019-09-19 RX ORDER — LISINOPRIL 5 MG/1
5 TABLET ORAL DAILY
Qty: 30 TAB | Refills: 5 | Status: SHIPPED | OUTPATIENT
Start: 2019-09-19 | End: 2019-11-06 | Stop reason: DRUGHIGH

## 2019-09-19 RX ORDER — KETOROLAC TROMETHAMINE 30 MG/ML
30 INJECTION, SOLUTION INTRAMUSCULAR; INTRAVENOUS ONCE
Qty: 1 VIAL | Refills: 0
Start: 2019-09-19 | End: 2019-09-19

## 2019-09-19 RX ORDER — DICLOFENAC SODIUM 10 MG/G
GEL TOPICAL
Qty: 100 G | Refills: 2 | Status: SHIPPED | OUTPATIENT
Start: 2019-09-19 | End: 2020-05-30

## 2019-09-19 RX ORDER — HYDROCODONE BITARTRATE AND ACETAMINOPHEN 5; 325 MG/1; MG/1
1 TABLET ORAL
Qty: 20 TAB | Refills: 0 | Status: SHIPPED | OUTPATIENT
Start: 2019-09-19 | End: 2019-09-22

## 2019-09-19 NOTE — PROGRESS NOTES
Health Maintenance Due   Topic Date Due    EYE EXAM RETINAL OR DILATED  08/11/1968    DTaP/Tdap/Td series (1 - Tdap) 08/11/1979    Shingrix Vaccine Age 50> (1 of 2) 08/11/2008    FOBT Q 1 YEAR AGE 50-75  08/11/2008       Chief Complaint   Patient presents with    Knee Pain    Diabetes       1. Have you been to the ER, urgent care clinic since your last visit? Hospitalized since your last visit? Yes When: 9/4/19 [facility] ED    2. Have you seen or consulted any other health care providers outside of the [address] [place] [address] since your last visit? Include any pap smears or colon screening. No    3) Do you have an Advance Directive on file? no    4) Are you interested in receiving information on Advance Directives? NO      Patient is accompanied by self I have received verbal consent from Moo Andujar to discuss any/all medical information while they are present in the room. After obtaining consent, and per orders of Dr. Blaine Vieyra, injection of ketorolac 30mg given by Doyle De Jesus LPN. Patient instructed to remain in clinic for 20 minutes afterwards, and to report any adverse reaction to me immediately.

## 2019-09-19 NOTE — PROGRESS NOTES
HISTORY OF PRESENT ILLNESS  Jane Osborne is a 62 y.o. male here with the complaining of bilateral moderate to severe knee pain for last several days. He is not able to walk at all. Pain is a 10 out of 10 when he walks. No fever. Has DJD in both knees. No recent fall or injury. Has diabetes, seeing endocrinologist.  And metformin doses were increased to 1000 mg twice a day. Patient had history of renal insufficiency. He is monitoring blood sugar. Fasting blood sugar dropping to 90s. Tr Briones Has hypertension, blood pressure seems stable. Taking lisinopril 5 mg a day. No chest pain palpitation or shortness of breath. He is not drinking alcohol right now. All labs reviewed, imaging reviewed. Medications are reconciled. Knee Pain     Diabetes     Medication Refill         Review of Systems   Constitutional: Negative. HENT: Negative. Eyes: Negative. Respiratory: Negative. Cardiovascular: Negative. Gastrointestinal: Negative. Genitourinary: Negative. Musculoskeletal: Positive for joint pain. Skin: Negative. Neurological: Negative. Endo/Heme/Allergies: Negative. Psychiatric/Behavioral: Negative. Physical Exam   Constitutional: He appears well-developed and well-nourished. No distress. Neck: Normal range of motion. Neck supple. No JVD present. No thyromegaly present. Cardiovascular: Normal rate, regular rhythm, normal heart sounds and intact distal pulses. Pulmonary/Chest: Effort normal and breath sounds normal. No respiratory distress. He has no wheezes. Musculoskeletal: He exhibits tenderness. He exhibits no edema. B/L knee: Warm to touch. Joint crepitus present. Range of motion moderately restricted. Fluid present. Very tender. Psychiatric: He has a normal mood and affect. His behavior is normal.       ASSESSMENT and PLAN  Diagnoses and all orders for this visit:    1. Acute pain of both knees    Rest and ice pack.   Will give,  -     predniSONE (STERAPRED) 5 mg dose pack; As directed for 6 days  -     ketorolac (TORADOL) 30 mg/mL (1 mL) injection; 1 mL IM once  -     KETOROLAC TROMETHAMINE INJ    Will give,  -     HYDROcodone-acetaminophen (NORCO) 5-325 mg per tablet; Take 1 Tab by mouth every eight (8) hours as needed for Pain for up to 3 days. Max Daily Amount: 3 Tabs. #30 no refill. Will refer,  -     REFERRAL TO ORTHOPEDICS    2. Type 2 diabetes mellitus without complication, without long-term current use of insulin ([facility])  Blood sugar coming down. Taking metformin thousand milligrams twice a day along with the abdomen. 2 mg twice a day. Early morning blood sugar dropping to 90s. Is advised him to monitor blood sugar closely. If all morning blood sugars remain low, he needs to reduce glimepiride dosage at night to 1 mg.   3. ETOH abuse  Not drinking anymore. 4. Essential hypertension    Stable blood pressure. Will give,  -     lisinopril (PRINIVIL, ZESTRIL) 5 mg tablet; Take 1 Tab by mouth daily. 5. Arthritis of knee    Will refill,  -     diclofenac (VOLTAREN) 1 % gel; Apply  to affected area two (2) times daily as needed for Pain. knee          Issues reviewed with her: referral to Diabetic Education department, diabetic diet discussed in detail, written exchange diet given, home glucose monitoring emphasized, all medications, side effects and compliance discussed carefully, foot care discussed and Podiatry visits discussed, annual eye examinations at Ophthalmology discussed, long term diabetic complications discussed and labs immediately prior to next visit.

## 2019-09-25 ENCOUNTER — APPOINTMENT (OUTPATIENT)
Dept: GENERAL RADIOLOGY | Age: 59
End: 2019-09-25
Attending: PHYSICIAN ASSISTANT
Payer: MEDICAID

## 2019-09-25 ENCOUNTER — HOSPITAL ENCOUNTER (EMERGENCY)
Age: 59
Discharge: HOME | End: 2019-09-25
Attending: EMERGENCY MEDICINE
Payer: MEDICAID

## 2019-09-25 VITALS
HEIGHT: 73 IN | TEMPERATURE: 97.8 F | OXYGEN SATURATION: 98 % | WEIGHT: 178.31 LBS | HEART RATE: 80 BPM | BODY MASS INDEX: 23.63 KG/M2 | RESPIRATION RATE: 18 BRPM | DIASTOLIC BLOOD PRESSURE: 78 MMHG | SYSTOLIC BLOOD PRESSURE: 142 MMHG

## 2019-09-25 DIAGNOSIS — N28.9 RENAL INSUFFICIENCY: ICD-10-CM

## 2019-09-25 DIAGNOSIS — L03.012 PARONYCHIA OF FINGER OF LEFT HAND: Primary | ICD-10-CM

## 2019-09-25 PROCEDURE — 73140 X-RAY EXAM OF FINGER(S): CPT

## 2019-09-25 PROCEDURE — 75810000289 HC I&D ABSCESS SIMP/COMP/MULT

## 2019-09-25 PROCEDURE — 74011000250 HC RX REV CODE- 250: Performed by: PHYSICIAN ASSISTANT

## 2019-09-25 PROCEDURE — 99283 EMERGENCY DEPT VISIT LOW MDM: CPT

## 2019-09-25 RX ORDER — LIDOCAINE HYDROCHLORIDE 10 MG/ML
5 INJECTION, SOLUTION EPIDURAL; INFILTRATION; INTRACAUDAL; PERINEURAL ONCE
Status: COMPLETED | OUTPATIENT
Start: 2019-09-25 | End: 2019-09-25

## 2019-09-25 RX ORDER — BUPIVACAINE HYDROCHLORIDE 5 MG/ML
5 INJECTION, SOLUTION EPIDURAL; INTRACAUDAL
Status: COMPLETED | OUTPATIENT
Start: 2019-09-25 | End: 2019-09-25

## 2019-09-25 RX ORDER — CLINDAMYCIN HYDROCHLORIDE 300 MG/1
300 CAPSULE ORAL 4 TIMES DAILY
Qty: 28 CAP | Refills: 0 | Status: SHIPPED | OUTPATIENT
Start: 2019-09-25 | End: 2019-10-02

## 2019-09-25 RX ADMIN — LIDOCAINE HYDROCHLORIDE 5 ML: 10 INJECTION, SOLUTION EPIDURAL; INFILTRATION; INTRACAUDAL at 23:04

## 2019-09-25 RX ADMIN — BACITRACIN ZINC, NEOMYCIN SULFATE, POLYMYXIN B SULFATE 1 PACKET: 3.5; 5000; 4 OINTMENT TOPICAL at 23:03

## 2019-09-25 RX ADMIN — BUPIVACAINE HYDROCHLORIDE 25 MG: 5 INJECTION, SOLUTION EPIDURAL; INTRACAUDAL; PERINEURAL at 23:04

## 2019-09-26 NOTE — ED PROVIDER NOTES
EMERGENCY DEPARTMENT HISTORY AND PHYSICAL EXAM      Date: 9/25/2019  Patient Name: John Lunsford    History of Presenting Illness     Chief Complaint   Patient presents with    Finger Swelling     History Provided By: Patient    HPI: John Lunsford, 62 y.o. male with eczema, DM, arthritis, tobacco abuse who presents ambulatory to the ED with cc of acute moderate aching Lt 4th finger pain and swelling x 3 days. Pt endorses he remembers hitting it against something at work and then he pulled a splinter out of his Lt 4th cuticle the other day. Denies fever, chills, n/v, drainage, numbness/tingling. Pt endorses chronic deformity to distal Lt 4th finger from injury years ago. No meds or modifying factors. PCP: Alejandrina Barclay MD    There are no other complaints, changes, or physical findings at this time. No current facility-administered medications on file prior to encounter. Current Outpatient Medications on File Prior to Encounter   Medication Sig Dispense Refill    predniSONE (STERAPRED) 5 mg dose pack As directed for 6 days 1 Package 0    lisinopril (PRINIVIL, ZESTRIL) 5 mg tablet Take 1 Tab by mouth daily. 30 Tab 5    diclofenac (VOLTAREN) 1 % gel Apply  to affected area two (2) times daily as needed for Pain. knee 100 g 2    metFORMIN (GLUCOPHAGE) 1,000 mg tablet Take 1 Tab by mouth two (2) times daily (with meals). 60 Tab 6    glimepiride (AMARYL) 2 mg tablet Take 1 Tab by mouth two (2) times a day. 60 Tab 6    folic acid (FOLVITE) 1 mg tablet Take 1 Tab by mouth daily. 30 Tab 0    nicotine (NICODERM CQ) 14 mg/24 hr patch 1 Patch by TransDERmal route daily for 30 days. 30 Patch 0    multivitamin, tx-iron-ca-min (THERA-M W/ IRON) 9 mg iron-400 mcg tab tablet Take 1 Tab by mouth daily. 30 Tab 0    thiamine mononitrate (B-1) 100 mg tablet Take 1 Tab by mouth daily. 30 Tab 0    ergocalciferol (ERGOCALCIFEROL) 50,000 unit capsule Take 1 Cap by mouth every seven (7) days.  4 Cap 3    nystatin (MYCOSTATIN) topical cream Apply  to affected area two (2) times a day. 30 g 0    acetaminophen (TYLENOL) 500 mg tablet Take 1 Tab by mouth two (2) times daily as needed for Pain. 60 Tab 5    tadalafil (CIALIS) 5 mg tablet Take 1 Tab by mouth daily as needed (ED). 10 Tab 0    glucose blood VI test strips (BLOOD GLUCOSE TEST) strip Check BG 1-2 times/day 100 Strip 11    Blood-Glucose Meter (RELION ALL-IN-ONE METER) monitoring kit Check BG 1-2 times/day 1 Kit 0     Past History     Past Medical History:  Past Medical History:   Diagnosis Date    Arthritis     Diabetes ([facility] [address]     Diabetes mellitus ([facility] [address] 2012    Eczema 2012     Past Surgical History:  Past Surgical History:   Procedure Laterality Date    HX ORTHOPAEDIC      L knee scope     Family History:  Family History   Problem Relation Age of Onset    Hypertension Mother    [facility] Arthritis-osteo Mother     No Known Problems Father     Diabetes Sister      Social History:  Social History     Tobacco Use    Smoking status: Former Smoker     Packs/day: 0.25     Last attempt to quit: 2019     Years since quittin.0    Smokeless tobacco: Current User   Substance Use Topics    Alcohol use: Not Currently     Alcohol/week: 8.3 standard drinks     Types: 5 Cans of beer, 5 Standard drinks or equivalent per week     Comment: 3-4 beers a day and 1 shot vodka 3x/week    Drug use: Not Currently     Types: Cocaine, Heroin     Comment: remote hx heroine and cocaine     Allergies: Allergies   Allergen Reactions    Doxycycline Itching    Tramadol Itching    Pcn [Penicillins] Hives     Review of Systems   Review of Systems   Constitutional: Negative. Negative for activity change, appetite change, chills, diaphoresis, fatigue and fever. HENT: Negative. Eyes: Negative. Negative for pain and visual disturbance. Respiratory: Negative. Negative for apnea, cough, chest tightness and shortness of breath. Cardiovascular: Negative.   Negative for chest pain, palpitations and leg swelling. Gastrointestinal: Negative. Negative for abdominal pain, diarrhea, nausea and vomiting. Genitourinary: Negative. Musculoskeletal: Positive for arthralgias and joint swelling. Skin: Positive for rash. Negative for color change, pallor and wound. Neurological: Negative for dizziness, light-headedness and headaches. Psychiatric/Behavioral: Negative. Physical Exam   Physical Exam   Constitutional: He is oriented to person, place, and time. He appears well-developed and well-nourished. No distress. HENT:   Head: Normocephalic and atraumatic. Right Ear: Hearing and external ear normal.   Left Ear: Hearing and external ear normal.   Nose: Nose normal.   Eyes: Pupils are equal, round, and reactive to light. Conjunctivae and EOM are normal.   Neck: Normal range of motion. Cardiovascular:   Pulses:       Radial pulses are 2+ on the right side, and 2+ on the left side. Pulmonary/Chest: Effort normal. No accessory muscle usage. No respiratory distress. Musculoskeletal: Normal range of motion. Left wrist: Normal.        Right hand: Normal.        Left hand: He exhibits tenderness, bony tenderness and swelling (Lt 4th finger swelilng to proximal cuticle. ). He exhibits normal range of motion, normal two-point discrimination, normal capillary refill, no deformity and no laceration. Normal sensation noted. Normal strength noted. Neurological: He is alert and oriented to person, place, and time. Skin: Skin is warm, dry and intact. Rash noted. Rash is pustular. He is not diaphoretic. There is erythema (mild erythema to Lt 4th finger cuticle). No pallor. Psychiatric: He has a normal mood and affect. His speech is normal and behavior is normal. Judgment and thought content normal.   Nursing note and vitals reviewed. Diagnostic Study Results   Labs -   No results found for this or any previous visit (from the past 12 hour(s)).     Radiologic Studies - XR 4TH FINGER LT MIN 2 V   Final Result   IMPRESSION: No acute abnormality. Xr 4th Finger Lt Min 2 V    Result Date: 9/25/2019  IMPRESSION: No acute abnormality. Medical Decision Making   I am the first provider for this patient. I reviewed the vital signs, available nursing notes, past medical history, past surgical history, family history and social history. Vital Signs-Reviewed the patient's vital signs. Patient Vitals for the past 12 hrs:   Temp Pulse Resp BP SpO2   09/25/19 2058 97.8 °F (36.6 °C) 80 18 142/78 98 %     Pulse Oximetry Analysis - 98% on RA    Records Reviewed: Nursing Notes, Old Medical Records, Previous Radiology Studies and Previous Laboratory Studies    Provider Notes (Medical Decision Making):   Patient presents with fluctuant warm painful lesion concerning for abscess/ paronychia to Lt 4th finger. No signs of sepsis at this time. Will do I+D in ER, send home with antibiotics and give abscess management and prevention instructions. ED Course:   Initial assessment performed. The patients presenting problems have been discussed, and they are in agreement with the care plan formulated and outlined with them. I have encouraged them to ask questions as they arise throughout their visit. Procedure Note - Incision and Drainage:   10:41 PM  Performed by: MARIOLA Barrera  Complexity: complex  Skin prepped with Betadine. Sterile field established. Anesthesia achieved with 3 mLs of Lidocaine 1% without epinephrine and 0.5% Bupivacaine using a digital block. Abscess to Lt 2nd finger nailbed was incised with # 11 blade, and 1mLs of purulent drainage was expressed. Wound probed and irrigated. Sterile dressing applied. Estimated blood loss: 0  The procedure took 16-30 minutes, and pt tolerated well. Progress Note:   Updated pt on all returned results and findings. Discussed the importance of proper follow up as referred below along with return precautions.  Pt in agreement with the care plan and expresses agreement with and understanding of all items discussed. Disposition:  11:22 PM  I have discussed with patient their diagnosis, treatment, and follow up plan. The patient agrees to follow up as outlined in discharge paperwork and also to return to the ED with any worsening. Arcenio Bragg PA-C      PLAN:  1. Current Discharge Medication List      START taking these medications    Details   clindamycin (CLEOCIN) 300 mg capsule Take 1 Cap by mouth four (4) times daily for 7 days. Qty: 28 Cap, Refills: 0         CONTINUE these medications which have NOT CHANGED    Details   predniSONE (STERAPRED) 5 mg dose pack As directed for 6 days  Qty: 1 Package, Refills: 0    Associated Diagnoses: Acute pain of both knees      lisinopril (PRINIVIL, ZESTRIL) 5 mg tablet Take 1 Tab by mouth daily. Qty: 30 Tab, Refills: 5    Associated Diagnoses: Essential hypertension      diclofenac (VOLTAREN) 1 % gel Apply  to affected area two (2) times daily as needed for Pain. knee  Qty: 100 g, Refills: 2    Associated Diagnoses: Arthritis of knee      metFORMIN (GLUCOPHAGE) 1,000 mg tablet Take 1 Tab by mouth two (2) times daily (with meals). Qty: 60 Tab, Refills: 6    Associated Diagnoses: Type 2 diabetes mellitus without complication, without long-term current use of insulin (HCC)      glimepiride (AMARYL) 2 mg tablet Take 1 Tab by mouth two (2) times a day. Qty: 60 Tab, Refills: 6    Associated Diagnoses: Type 2 diabetes mellitus without complication, without long-term current use of insulin (HCC)      folic acid (FOLVITE) 1 mg tablet Take 1 Tab by mouth daily. Qty: 30 Tab, Refills: 0      nicotine (NICODERM CQ) 14 mg/24 hr patch 1 Patch by TransDERmal route daily for 30 days. Qty: 30 Patch, Refills: 0      multivitamin, tx-iron-ca-min (THERA-M W/ IRON) 9 mg iron-400 mcg tab tablet Take 1 Tab by mouth daily.   Qty: 30 Tab, Refills: 0      thiamine mononitrate (B-1) 100 mg tablet Take 1 Tab by mouth daily. Qty: 30 Tab, Refills: 0      ergocalciferol (ERGOCALCIFEROL) 50,000 unit capsule Take 1 Cap by mouth every seven (7) days. Qty: 4 Cap, Refills: 3    Associated Diagnoses: Vitamin D deficiency      nystatin (MYCOSTATIN) topical cream Apply  to affected area two (2) times a day. Qty: 30 g, Refills: 0    Associated Diagnoses: Tinea pedis of both feet      acetaminophen (TYLENOL) 500 mg tablet Take 1 Tab by mouth two (2) times daily as needed for Pain. Qty: 60 Tab, Refills: 5    Associated Diagnoses: Arthritis of knee      tadalafil (CIALIS) 5 mg tablet Take 1 Tab by mouth daily as needed (ED). Qty: 10 Tab, Refills: 0    Associated Diagnoses: Other male erectile dysfunction      glucose blood VI test strips (BLOOD GLUCOSE TEST) strip Check BG 1-2 times/day  Qty: 100 Strip, Refills: 11      Blood-Glucose Meter (RELION ALL-IN-ONE METER) monitoring kit Check BG 1-2 times/day  Qty: 1 Kit, Refills: 0           2. Follow-up Information     Follow up With Specialties Details Why Contact Info    Emily Pham MD Internal Medicine Schedule an appointment as soon as possible for a visit in 1 week As needed, If symptoms worsen [address]  [facility] [address]  [phone]          Return to ED if worse     Diagnosis     Clinical Impression:   1. Paronychia of finger of left hand            Please note that this dictation was completed with Dragon, computer voice recognition software. Quite often unanticipated grammatical, syntax, homophones, and other interpretive errors are inadvertently transcribed by the computer software. Please disregard these errors. Additionally, please excuse any errors that have escaped final proofreading.

## 2019-09-26 NOTE — DISCHARGE INSTRUCTIONS
Patient Education        Paronychia: Care Instructions  Your Care Instructions  Paronychia (say \"qyqz-uw-SN-tiffanie-uh\") is an infection of the skin around a fingernail or toenail. It happens when germs enter through a break in the skin. The doctor may have made a small cut in the infected area to drain the pus. Most cases of paronychia improve in a few days. But watch your symptoms and follow your doctor's advice. Though rare, a mild case can turn into something more serious and infect your entire finger or toe. Also, it is possible for an infection to return. Follow-up care is a key part of your treatment and safety. Be sure to make and go to all appointments, and call your doctor if you are having problems. It's also a good idea to know your test results and keep a list of the medicines you take. How can you care for yourself at home? · If your doctor told you how to care for your infected nail, follow the doctor's instructions. If you did not get instructions, follow this general advice:  ? Wash the area with clean water 2 times a day. Don't use hydrogen peroxide or alcohol, which can slow healing. ? You may cover the area with a thin layer of petroleum jelly, such as Vaseline, and a nonstick bandage. ? Apply more petroleum jelly and replace the bandage as needed. · If your doctor prescribed antibiotics, take them as directed. Do not stop taking them just because you feel better. You need to take the full course of antibiotics. · Take an over-the-counter pain medicine, such as acetaminophen (Tylenol), ibuprofen (Advil, Motrin), or naproxen (Aleve). Read and follow all instructions on the label. · Do not take two or more pain medicines at the same time unless the doctor told you to. Many pain medicines have acetaminophen, which is Tylenol. Too much acetaminophen (Tylenol) can be harmful. · Prop up the toe or finger so that it is higher than the level of your heart.  This will help with pain and swelling. · Apply heat. Put a warm water bottle, heating pad set on low, or warm cloth on your finger or toe. Do not go to sleep with a heating pad on your skin. · Soak the area in warm water twice a day for 15 minutes each time. After soaking, dry the area well and apply a thin layer of petroleum jelly, such as Vaseline. Put on a new bandage. When should you call for help? Call your doctor now or seek immediate medical care if:    · You have signs of new or worsening infection, such as:  ? Increased pain, swelling, warmth, or redness. ? Red streaks leading from the infected skin. ? Pus draining from the area. ? A fever.    Watch closely for changes in your health, and be sure to contact your doctor if:    · You do not get better as expected. Where can you learn more? Go to http://ajit-david.info/. Enter C435 in the search box to learn more about \"Paronychia: Care Instructions. \"  Current as of: April 1, 2019  Content Version: 12.2  © 7616-4097 [employer]. Care instructions adapted under license by [employer] (which disclaims liability or warranty for this information). If you have questions about a medical condition or this instruction, always ask your healthcare professional. [address] any warranty or liability for your use of this information.

## 2019-09-26 NOTE — ED NOTES
Emergency Department Nursing Plan of Care       The Nursing Plan of Care is developed from the Nursing assessment and Emergency Department Attending provider initial evaluation. The plan of care may be reviewed in the ED Provider note.     The Plan of Care was developed with the following considerations:   Patient / Family readiness to learn indicated by:successful return demonstration  Persons(s) to be included in education: patient  Barriers to Learning/Limitations:Deyanira Escalante RN    9/25/2019   10:03 PM

## 2019-11-02 ENCOUNTER — HOSPITAL ENCOUNTER (EMERGENCY)
Age: 59
Discharge: HOME | End: 2019-11-02
Attending: EMERGENCY MEDICINE
Payer: COMMERCIAL

## 2019-11-02 VITALS
BODY MASS INDEX: 24.11 KG/M2 | HEART RATE: 87 BPM | OXYGEN SATURATION: 98 % | HEIGHT: 72 IN | SYSTOLIC BLOOD PRESSURE: 147 MMHG | RESPIRATION RATE: 18 BRPM | TEMPERATURE: 98 F | WEIGHT: 178 LBS | DIASTOLIC BLOOD PRESSURE: 81 MMHG

## 2019-11-02 DIAGNOSIS — M17.10 ARTHRITIS OF KNEE: ICD-10-CM

## 2019-11-02 DIAGNOSIS — T14.8XXA MUSCLE STRAIN: Primary | ICD-10-CM

## 2019-11-02 PROCEDURE — 99283 EMERGENCY DEPT VISIT LOW MDM: CPT

## 2019-11-02 PROCEDURE — 74011250637 HC RX REV CODE- 250/637: Performed by: EMERGENCY MEDICINE

## 2019-11-02 RX ORDER — IBUPROFEN 400 MG/1
800 TABLET ORAL ONCE
Status: COMPLETED | OUTPATIENT
Start: 2019-11-02 | End: 2019-11-02

## 2019-11-02 RX ORDER — IBUPROFEN 600 MG/1
600 TABLET ORAL
Qty: 10 TAB | Refills: 0 | Status: SHIPPED | OUTPATIENT
Start: 2019-11-02 | End: 2020-05-30

## 2019-11-02 RX ADMIN — IBUPROFEN 800 MG: 400 TABLET, FILM COATED ORAL at 21:07

## 2019-11-03 NOTE — ED NOTES
Patient presents to ED with c/o neck pain for 1 month. Patient states that he is a . Patient is alert and oriented x 4 and in no acute distress at this time. Respirations are at a regular rate, depth, and pattern. Patient updated on plan of care and has no questions or concerns at this time. Call bell within reach. Will continue to monitor. Please reference nursing assessment. Emergency Department Nursing Plan of Care       The Nursing Plan of Care is developed from the Nursing assessment and Emergency Department Attending provider initial evaluation. The plan of care may be reviewed in the ED Provider note.     The Plan of Care was developed with the following considerations:   Patient / Family readiness to learn indicated by:verbalized understanding  Persons(s) to be included in education: patient  Barriers to Learning/Limitations:No    Signed     Yumi Guajardo RN    11/2/2019  8:30 PM

## 2019-11-03 NOTE — ED PROVIDER NOTES
EMERGENCY DEPARTMENT HISTORY AND PHYSICAL EXAM      Date: 11/2/2019  Patient Name: Alisha Malloy    History of Presenting Illness     Chief Complaint   Patient presents with    Neck Pain     x1 month without injury       History Provided By: Patient    HPI: Alisha Malloy, 62 y.o. male with PMHx significant for DM, arthritis, who presents the chief complaint of neck pain. Patient states that he has had pain over the right side of his neck extending to his right shoulder for at least the last month. 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 The pain is worse with movement. Has not taken anything at home for pain. He has not seen his primary care doctor. No weakness, numbness, or tingling in his upper extremities. No fevers. PCP: Madie Inman MD    There are no other complaints, changes, or physical findings at this time. Current Outpatient Medications   Medication Sig Dispense Refill    ibuprofen (MOTRIN) 600 mg tablet Take 1 Tab by mouth every six (6) hours as needed for Pain. 10 Tab 0    predniSONE (STERAPRED) 5 mg dose pack As directed for 6 days 1 Package 0    lisinopril (PRINIVIL, ZESTRIL) 5 mg tablet Take 1 Tab by mouth daily. 30 Tab 5    diclofenac (VOLTAREN) 1 % gel Apply  to affected area two (2) times daily as needed for Pain. knee 100 g 2    metFORMIN (GLUCOPHAGE) 1,000 mg tablet Take 1 Tab by mouth two (2) times daily (with meals). 60 Tab 6    glimepiride (AMARYL) 2 mg tablet Take 1 Tab by mouth two (2) times a day. 60 Tab 6    folic acid (FOLVITE) 1 mg tablet Take 1 Tab by mouth daily. 30 Tab 0    multivitamin, tx-iron-ca-min (THERA-M W/ IRON) 9 mg iron-400 mcg tab tablet Take 1 Tab by mouth daily. 30 Tab 0    thiamine mononitrate (B-1) 100 mg tablet Take 1 Tab by mouth daily.  30 Tab 0    ergocalciferol (ERGOCALCIFEROL) 50,000 unit capsule Take 1 Cap by mouth every seven (7) days. 4 Cap 3    nystatin (MYCOSTATIN) topical cream Apply  to affected area two (2) times a day. 30 g 0    acetaminophen (TYLENOL) 500 mg tablet Take 1 Tab by mouth two (2) times daily as needed for Pain. 60 Tab 5    tadalafil (CIALIS) 5 mg tablet Take 1 Tab by mouth daily as needed (ED). 10 Tab 0    glucose blood VI test strips (BLOOD GLUCOSE TEST) strip Check BG 1-2 times/day 100 Strip 11    Blood-Glucose Meter (RELION ALL-IN-ONE METER) monitoring kit Check BG 1-2 times/day 1 Kit 0     Past History     Past Medical History:  Past Medical History:   Diagnosis Date    Arthritis     [facility]     Diabetes mellitus ([facility] 2012    Eczema 2012     Past Surgical History:  Past Surgical History:   Procedure Laterality Date    HX ORTHOPAEDIC      L knee scope     Family History:  Family History   Problem Relation Age of Onset    Hypertension Mother    Shoreham Mons Arthritis-osteo Mother     No Known Problems Father     Diabetes Sister      Social History:  Social History     Tobacco Use    Smoking status: Former Smoker     Packs/day: 0.25     Last attempt to quit: 2019     Years since quittin.1    Smokeless tobacco: Current User   Substance Use Topics    Alcohol use: Not Currently     Alcohol/week: 8.3 standard drinks     Types: 5 Cans of beer, 5 Standard drinks or equivalent per week     Comment: 3-4 beers a day and 1 shot vodka 3x/week    Drug use: Not Currently     Types: Cocaine, Heroin     Comment: remote hx heroine and cocaine     Allergies: Allergies   Allergen Reactions    Doxycycline Itching    Tramadol Itching    Pcn [Penicillins] Hives     Review of Systems   Review of Systems   Constitutional: Negative for chills and fever. HENT: Negative for congestion, rhinorrhea and sore throat. Respiratory: Negative for cough and shortness of breath. Cardiovascular: Negative for chest pain.    Gastrointestinal: Negative for abdominal pain, nausea and vomiting. Genitourinary: Negative for dysuria and urgency. Musculoskeletal: Positive for neck pain. Skin: Negative for rash. Neurological: Negative for dizziness, light-headedness and headaches. All other systems reviewed and are negative. Physical Exam   Physical Exam   Constitutional: He is oriented to person, place, and time. He appears well-developed and well-nourished. No distress. HENT:   Head: Normocephalic and atraumatic. Eyes: Pupils are equal, round, and reactive to light. Conjunctivae and EOM are normal.   Neck: Normal range of motion. Muscular tenderness present. No spinous process tenderness present. Normal range of motion present. Cardiovascular: Normal rate, regular rhythm and intact distal pulses. Pulmonary/Chest: Effort normal and breath sounds normal. No stridor. No respiratory distress. Abdominal: Soft. He exhibits no distension. There is no tenderness. Musculoskeletal: Normal range of motion. Neurological: He is alert and oriented to person, place, and time. Normal strength and sensation in b/l UE   Skin: Skin is warm and dry. Psychiatric: He has a normal mood and affect. Nursing note and vitals reviewed. Diagnostic Study Results   Labs -   No results found for this or any previous visit (from the past 12 hour(s)). Radiologic Studies -   No orders to display     No results found. Medical Decision Making   I am the first provider for this patient. I reviewed the vital signs, available nursing notes, past medical history, past surgical history, family history and social history. Vital Signs-Reviewed the patient's vital signs.   Patient Vitals for the past 12 hrs:   Temp Pulse Resp BP SpO2   11/02/19 2025 98 °F (36.7 °C) 87 18 147/81 98 %       Pulse Oximetry Analysis - 98% on RA      Records Reviewed: Nursing Notes and Old Medical Records    Provider Notes (Medical Decision Making):   Patient presents with 1 month of right-sided neck pain.  On exam he has tenderness over the muscles in his right cervical paraspinal area extending into his right shoulder. No midline tenderness. Normal neurologic exam.  Suspect likely overuse injury. No need for x-rays at this time. Plan for short course of NSAIDs and follow-up with primary care. ED Course:   Initial assessment performed. The patients presenting problems have been discussed, and they are in agreement with the care plan formulated and outlined with them. I have encouraged them to ask questions as they arise throughout their visit. Critical Care:  none    Disposition:  Discharge Note:  8:55 PM  The patient has been re-evaluated and is ready for discharge. Reviewed available results with patient. Counseled patient on diagnosis and care plan. Patient has expressed understanding, and all questions have been answered. Patient agrees with plan and agrees to follow up as recommended, or to return to the ED if their symptoms worsen. Discharge instructions have been provided and explained to the patient, along with reasons to return to the ED. PLAN:  1. Discharge Medication List as of 11/2/2019  8:55 PM      START taking these medications    Details   ibuprofen (MOTRIN) 600 mg tablet Take 1 Tab by mouth every six (6) hours as needed for Pain., Normal, Disp-10 Tab, R-0         CONTINUE these medications which have NOT CHANGED    Details   predniSONE (STERAPRED) 5 mg dose pack As directed for 6 days, Normal, Disp-1 Package, R-0      lisinopril (PRINIVIL, ZESTRIL) 5 mg tablet Take 1 Tab by mouth daily. , Normal, Disp-30 Tab, R-5      diclofenac (VOLTAREN) 1 % gel Apply  to affected area two (2) times daily as needed for Pain. knee, Normal, Disp-100 g, R-2      metFORMIN (GLUCOPHAGE) 1,000 mg tablet Take 1 Tab by mouth two (2) times daily (with meals). , Normal, Disp-60 Tab, R-6      glimepiride (AMARYL) 2 mg tablet Take 1 Tab by mouth two (2) times a day., Normal, Disp-60 Tab, R-6 folic acid (FOLVITE) 1 mg tablet Take 1 Tab by mouth daily. , OTC, Disp-30 Tab, R-0      multivitamin, tx-iron-ca-min (THERA-M W/ IRON) 9 mg iron-400 mcg tab tablet Take 1 Tab by mouth daily. , OTC, Disp-30 Tab, R-0      thiamine mononitrate (B-1) 100 mg tablet Take 1 Tab by mouth daily. , OTC, Disp-30 Tab, R-0      ergocalciferol (ERGOCALCIFEROL) 50,000 unit capsule Take 1 Cap by mouth every seven (7) days. , Normal, Disp-4 Cap, R-3      nystatin (MYCOSTATIN) topical cream Apply  to affected area two (2) times a day., Normal, Disp-30 g, R-0      acetaminophen (TYLENOL) 500 mg tablet Take 1 Tab by mouth two (2) times daily as needed for Pain., Normal, Disp-60 Tab, R-5      tadalafil (CIALIS) 5 mg tablet Take 1 Tab by mouth daily as needed (ED)., Normal, Disp-10 Tab, R-0      glucose blood VI test strips (BLOOD GLUCOSE TEST) strip Check BG 1-2 times/day, Normal, Disp-100 Strip, R-11      Blood-Glucose Meter (RELION ALL-IN-ONE METER) monitoring kit Check BG 1-2 times/day, Normal, Disp-1 Kit, R-0           2. Follow-up Information     Follow up With Specialties Details Why Contact Info    Nahomi Simpson MD Internal Medicine Schedule an appointment as soon as possible for a visit go to your appointment as planned in 4 days [address]  [address] [facility]  [phone]      [facility] EMERGENCY DEPT Emergency Medicine  As needed, If symptoms worsen [facility]  [phone]        Return to ED if worse     Diagnosis     Clinical Impression:   1. Muscle strain    2. Arthritis of knee        This note will not be viewable in [employer]. Please note that this dictation was completed with [employer], the [employer] voice recognition software. Quite often unanticipated grammatical, syntax, homophones, and other interpretive errors are inadvertently transcribed by the computer software. Please disregard these errors.   Please excuse any errors that have escaped final proofreading

## 2019-11-06 ENCOUNTER — OFFICE VISIT (OUTPATIENT)
Dept: INTERNAL MEDICINE CLINIC | Age: 59
End: 2019-11-06

## 2019-11-06 ENCOUNTER — HOSPITAL ENCOUNTER (OUTPATIENT)
Dept: GENERAL RADIOLOGY | Age: 59
Discharge: HOME | End: 2019-11-06
Payer: COMMERCIAL

## 2019-11-06 VITALS
WEIGHT: 176.2 LBS | TEMPERATURE: 98.2 F | OXYGEN SATURATION: 99 % | RESPIRATION RATE: 15 BRPM | DIASTOLIC BLOOD PRESSURE: 76 MMHG | HEIGHT: 72 IN | SYSTOLIC BLOOD PRESSURE: 114 MMHG | BODY MASS INDEX: 23.86 KG/M2 | HEART RATE: 94 BPM

## 2019-11-06 DIAGNOSIS — M54.2 NECK PAIN: ICD-10-CM

## 2019-11-06 DIAGNOSIS — E11.9 TYPE 2 DIABETES MELLITUS WITHOUT COMPLICATION, WITHOUT LONG-TERM CURRENT USE OF INSULIN (HCC): ICD-10-CM

## 2019-11-06 DIAGNOSIS — M54.2 NECK PAIN: Primary | ICD-10-CM

## 2019-11-06 DIAGNOSIS — I10 ESSENTIAL HYPERTENSION: ICD-10-CM

## 2019-11-06 DIAGNOSIS — E55.9 VITAMIN D DEFICIENCY: ICD-10-CM

## 2019-11-06 DIAGNOSIS — R80.9 MICROALBUMINURIA: ICD-10-CM

## 2019-11-06 PROCEDURE — 72052 X-RAY EXAM NECK SPINE 6/>VWS: CPT

## 2019-11-06 RX ORDER — BACLOFEN 20 MG/1
20 TABLET ORAL
Qty: 30 TAB | Refills: 0 | Status: SHIPPED | OUTPATIENT
Start: 2019-11-06 | End: 2021-03-08 | Stop reason: ALTCHOICE

## 2019-11-06 RX ORDER — ACETAMINOPHEN 325 MG/1
650 TABLET ORAL
Qty: 60 TAB | Refills: 2 | Status: SHIPPED | OUTPATIENT
Start: 2019-11-06

## 2019-11-06 RX ORDER — LISINOPRIL 2.5 MG/1
2.5 TABLET ORAL DAILY
Qty: 30 TAB | Refills: 5 | Status: SHIPPED | OUTPATIENT
Start: 2019-11-06 | End: 2020-07-01 | Stop reason: SDUPTHER

## 2019-11-06 NOTE — PROGRESS NOTES
HISTORY OF PRESENT ILLNESS  Meredith Russell is a 62 y.o. male here with the complaining of neck pain radiating to light right-sided shoulder and arm for last several weeks. She went to see an emergency room. Was prescribed ibuprofen. He mentions he is helping little bit. Pain is still there. No x-ray done. Has diabetes, seeing endocrinologist.  Watching diet. Eye checkup up-to-date. Mirza Nuñez Has hypertension, blood pressure seems stable. Not taking lisinopril. Mirza Nuñez No chest pain palpitation or shortness of breath. He is not drinking alcohol right now. All labs reviewed, imaging reviewed. Medications are reconciled. Has chronic knee pain neck pain and uncontrolled diabetes. 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 Knee Pain     Diabetes     Medication Refill     Hypertension    Associated symptoms include neck pain. Neck Pain     Letter for School/Work         Review of Systems   Constitutional: Negative. HENT: Negative. Eyes: Negative. Respiratory: Negative. Cardiovascular: Negative. Gastrointestinal: Negative. Genitourinary: Negative. Musculoskeletal: Positive for joint pain and neck pain. Skin: Negative. Neurological: Negative. Endo/Heme/Allergies: Negative. Psychiatric/Behavioral: Negative. Physical Exam   Constitutional: He appears well-developed and well-nourished. No distress. Neck: Normal range of motion. Neck supple. No JVD present. No thyromegaly present. Cardiovascular: Normal rate, regular rhythm, normal heart sounds and intact distal pulses. Pulmonary/Chest: Effort normal and breath sounds normal. No respiratory distress. He has no wheezes. Musculoskeletal: He exhibits tenderness. He exhibits no edema. Neck: Spine nontender. Paravertebral muscle tenderness and spasm present most on the right side. Range of motion moderately restricted. Bilateral knee: Mild tenderness in the knee joint. Range of motion restricted.    Psychiatric: He has a normal mood and affect. His behavior is normal.       ASSESSMENT and PLAN  Diagnoses and all orders for this visit:    1. Neck pain    Getting better message. Need to rule out pinched nerve or DJD in the neck. Will order x-ray. Will give,  -     baclofen (LIORESAL) 20 mg tablet; Take 1 Tab by mouth two (2) times daily as needed for Pain. -     acetaminophen (TYLENOL) 325 mg tablet; Take 2 Tabs by mouth three (3) times daily as needed for Pain.  -     REFERRAL TO PHYSICAL THERAPY    2. Type 2 diabetes mellitus without complication, without long-term current use of insulin (HCC)    Be an ADA diet and exercise. Will check,  -     HEMOGLOBIN A1C WITH EAG  -     MICROALBUMIN, UR, RAND W/ MICROALB/CREAT RATIO  -     REFERRAL TO OPHTHALMOLOGY  -     lisinopril (PRINIVIL, ZESTRIL) 2.5 mg tablet; Take 1 Tab by mouth daily. DC lisinopril 5 mg    3. Essential hypertension    Stable. Will call in lisinopril.  -     CBC WITH AUTOMATED DIFF  -     METABOLIC PANEL, COMPREHENSIVE  -     lisinopril (PRINIVIL, ZESTRIL) 2.5 mg tablet; Take 1 Tab by mouth daily. DC lisinopril 5 mg    4. Microalbuminuria  On low-dose lisinopril. 5. Vitamin D deficiency    Will check,  -     VITAMIN D, 25 HYDROXY            Issues reviewed with her: referral to Diabetic Education department, diabetic diet discussed in detail, written exchange diet given, home glucose monitoring emphasized, all medications, side effects and compliance discussed carefully, foot care discussed and Podiatry visits discussed, annual eye examinations at Ophthalmology discussed, long term diabetic complications discussed and labs immediately prior to next visit.

## 2019-11-06 NOTE — LETTER
11/6/2019 10:14 AM 
 
Mr. Danay Canales [address] [place] [address] [id number] To Whom It May Concern: 
 
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 Should you have any concerns please do not hesitate to contact my office at [phone]. Sincerely, Kymberly Dear, MD

## 2019-11-06 NOTE — PROGRESS NOTES
Health Maintenance Due   Topic Date Due    EYE EXAM RETINAL OR DILATED  08/11/1968    DTaP/Tdap/Td series (1 - Tdap) 08/11/1979    Shingrix Vaccine Age 50> (1 of 2) 08/11/2008    FOBT Q 1 YEAR AGE 50-75  08/11/2008    Pneumococcal 0-64 years (1 of 1 - PPSV23) 11/06/2019       Chief Complaint   Patient presents with    Back Pain    Diabetes    Hypertension       1. Have you been to the ER, urgent care clinic since your last visit? Hospitalized since your last visit? Yes When: 9/25/19 & 11/2/19    2. Have you seen or consulted any other health care providers outside of the [facility] [place] [address] since your last visit? Include any pap smears or colon screening. No    3) Do you have an Advance Directive on file? no    4) Are you interested in receiving information on Advance Directives? NO      Patient is accompanied by self I have received verbal consent from Jossue Aguilera to discuss any/all medical information while they are present in the room.

## 2019-11-07 LAB
25(OH)D3+25(OH)D2 SERPL-MCNC: 16.2 NG/ML (ref 30–100)
ALBUMIN SERPL-MCNC: 4.2 G/DL (ref 3.6–4.8)
ALBUMIN/CREAT UR: 24.5 MG/G CREAT (ref 0–30)
ALBUMIN/GLOB SERPL: 1.3 {RATIO} (ref 1.2–2.2)
ALP SERPL-CCNC: 56 IU/L (ref 39–117)
ALT SERPL-CCNC: 26 IU/L (ref 0–44)
AST SERPL-CCNC: 25 IU/L (ref 0–40)
BASOPHILS # BLD AUTO: 0 X10E3/UL (ref 0–0.2)
BASOPHILS NFR BLD AUTO: 0 %
BILIRUB SERPL-MCNC: 0.5 MG/DL (ref 0–1.2)
BUN SERPL-MCNC: 16 MG/DL (ref 8–27)
BUN/CREAT SERPL: 18 (ref 10–24)
CALCIUM SERPL-MCNC: 9.7 MG/DL (ref 8.6–10.2)
CHLORIDE SERPL-SCNC: 100 MMOL/L (ref 96–106)
CO2 SERPL-SCNC: 26 MMOL/L (ref 20–29)
CREAT SERPL-MCNC: 0.91 MG/DL (ref 0.76–1.27)
CREAT UR-MCNC: 242.5 MG/DL
EOSINOPHIL # BLD AUTO: 0 X10E3/UL (ref 0–0.4)
EOSINOPHIL NFR BLD AUTO: 1 %
ERYTHROCYTE [DISTWIDTH] IN BLOOD BY AUTOMATED COUNT: 12.9 % (ref 12.3–15.4)
EST. AVERAGE GLUCOSE BLD GHB EST-MCNC: 194 MG/DL
GLOBULIN SER CALC-MCNC: 3.3 G/DL (ref 1.5–4.5)
GLUCOSE SERPL-MCNC: 139 MG/DL (ref 65–99)
HBA1C MFR BLD: 8.4 % (ref 4.8–5.6)
HCT VFR BLD AUTO: 44 % (ref 37.5–51)
HGB BLD-MCNC: 14.4 G/DL (ref 13–17.7)
IMM GRANULOCYTES # BLD AUTO: 0 X10E3/UL (ref 0–0.1)
IMM GRANULOCYTES NFR BLD AUTO: 0 %
LYMPHOCYTES # BLD AUTO: 1 X10E3/UL (ref 0.7–3.1)
LYMPHOCYTES NFR BLD AUTO: 18 %
MCH RBC QN AUTO: 27.4 PG (ref 26.6–33)
MCHC RBC AUTO-ENTMCNC: 32.7 G/DL (ref 31.5–35.7)
MCV RBC AUTO: 84 FL (ref 79–97)
MICROALBUMIN UR-MCNC: 59.5 UG/ML
MONOCYTES # BLD AUTO: 0.5 X10E3/UL (ref 0.1–0.9)
MONOCYTES NFR BLD AUTO: 9 %
NEUTROPHILS # BLD AUTO: 3.9 X10E3/UL (ref 1.4–7)
NEUTROPHILS NFR BLD AUTO: 72 %
PLATELET # BLD AUTO: 193 X10E3/UL (ref 150–450)
POTASSIUM SERPL-SCNC: 4.3 MMOL/L (ref 3.5–5.2)
PROT SERPL-MCNC: 7.5 G/DL (ref 6–8.5)
RBC # BLD AUTO: 5.26 X10E6/UL (ref 4.14–5.8)
SODIUM SERPL-SCNC: 139 MMOL/L (ref 134–144)
WBC # BLD AUTO: 5.4 X10E3/UL (ref 3.4–10.8)

## 2019-11-12 ENCOUNTER — TELEPHONE (OUTPATIENT)
Dept: INTERNAL MEDICINE CLINIC | Age: 59
End: 2019-11-12

## 2019-11-12 DIAGNOSIS — M47.812 OSTEOARTHRITIS OF CERVICAL SPINE, UNSPECIFIED SPINAL OSTEOARTHRITIS COMPLICATION STATUS: Primary | ICD-10-CM

## 2019-11-12 DIAGNOSIS — G58.9 PINCHED NERVE IN NECK: Primary | ICD-10-CM

## 2019-11-12 NOTE — TELEPHONE ENCOUNTER
Pt states having tremendous neck pain, meds is not working.  Would like to see a Therapist. Pt requesting an order to see a Therapist @[facility] [phone]

## 2019-11-12 NOTE — TELEPHONE ENCOUNTER
Pt needs to speak with nurse about his xray results ASAP as he is still experiencing pain in his neck.

## 2019-11-13 DIAGNOSIS — E11.9 TYPE 2 DIABETES MELLITUS WITHOUT COMPLICATION, WITHOUT LONG-TERM CURRENT USE OF INSULIN (HCC): Primary | ICD-10-CM

## 2019-11-13 DIAGNOSIS — E55.9 VITAMIN D DEFICIENCY: ICD-10-CM

## 2019-11-13 RX ORDER — ERGOCALCIFEROL 1.25 MG/1
50000 CAPSULE ORAL
Qty: 4 CAP | Refills: 3 | Status: SHIPPED | OUTPATIENT
Start: 2019-11-13 | End: 2020-07-01 | Stop reason: ALTCHOICE

## 2019-11-13 NOTE — PROGRESS NOTES
A1c slightly improved but still elevated. will add on Jardiance 10 mg in the morning. Be on ADA diet and exercise. vit D level very low.will start on vit D 50,000 unit 1 cap weekly for 4 months. will repeat level in 4 months. adv to be on milk product and expose to sun for 20 min a day.

## 2019-11-15 ENCOUNTER — TELEPHONE (OUTPATIENT)
Dept: INTERNAL MEDICINE CLINIC | Age: 59
End: 2019-11-15

## 2019-11-15 NOTE — PROGRESS NOTES
The pt called back and after verifying HIPAA advised him of his lab results and the provider's recommendations. The pt voiced thanks and full understanding and will go his prescriptions today.

## 2019-11-15 NOTE — TELEPHONE ENCOUNTER
Pt spoke to [address] earlier and has a follow up question about his medications.

## 2019-11-15 NOTE — TELEPHONE ENCOUNTER
Returned the pt's call and after verifying HIPAA the patient wanted to know if he was supposed to take the Jardiance with the rest of his medications. Advised him to continue all other medications as directed. The pt voiced thanks and understanding.

## 2019-11-22 ENCOUNTER — TELEPHONE (OUTPATIENT)
Dept: INTERNAL MEDICINE CLINIC | Age: 59
End: 2019-11-22

## 2019-11-26 NOTE — TELEPHONE ENCOUNTER
Addressed previously via call and patient was advised at that time that a letter was also mailed to him.

## 2020-01-13 ENCOUNTER — TELEPHONE (OUTPATIENT)
Dept: INTERNAL MEDICINE CLINIC | Age: 60
End: 2020-01-13

## 2020-01-13 NOTE — TELEPHONE ENCOUNTER
Per verbal order by Dr. Drea Granados read back for confirmation, called the pt and advised him to hold his Metformin for 48 hours. The pt voiced thanks and full understanding.

## 2020-01-13 NOTE — TELEPHONE ENCOUNTER
Pt advised not to take metformin because it may interfere with his colonoscopy prep and procedure.  The procedure is tomorrow 1/14/20  Please contact pt today to inform what to do with the metformin

## 2020-01-14 ENCOUNTER — ANESTHESIA (OUTPATIENT)
Dept: ENDOSCOPY | Age: 60
End: 2020-01-14
Payer: MEDICAID

## 2020-01-14 ENCOUNTER — HOSPITAL ENCOUNTER (OUTPATIENT)
Age: 60
Setting detail: OUTPATIENT SURGERY
Discharge: HOME | End: 2020-01-14
Attending: SPECIALIST | Admitting: SPECIALIST
Payer: MEDICAID

## 2020-01-14 ENCOUNTER — ANESTHESIA EVENT (OUTPATIENT)
Dept: ENDOSCOPY | Age: 60
End: 2020-01-14
Payer: MEDICAID

## 2020-01-14 VITALS
HEIGHT: 72 IN | BODY MASS INDEX: 24.65 KG/M2 | HEART RATE: 61 BPM | WEIGHT: 182 LBS | RESPIRATION RATE: 13 BRPM | SYSTOLIC BLOOD PRESSURE: 136 MMHG | TEMPERATURE: 97.7 F | DIASTOLIC BLOOD PRESSURE: 72 MMHG | OXYGEN SATURATION: 100 %

## 2020-01-14 LAB
GLUCOSE BLD STRIP.AUTO-MCNC: 121 MG/DL (ref 65–100)
GLUCOSE BLD STRIP.AUTO-MCNC: 137 MG/DL (ref 65–100)
SERVICE CMNT-IMP: ABNORMAL
SERVICE CMNT-IMP: ABNORMAL

## 2020-01-14 PROCEDURE — 74011250636 HC RX REV CODE- 250/636: Performed by: NURSE ANESTHETIST, CERTIFIED REGISTERED

## 2020-01-14 PROCEDURE — 88305 TISSUE EXAM BY PATHOLOGIST: CPT

## 2020-01-14 PROCEDURE — 76060000032 HC ANESTHESIA 0.5 TO 1 HR: Performed by: SPECIALIST

## 2020-01-14 PROCEDURE — 76040000007: Performed by: SPECIALIST

## 2020-01-14 PROCEDURE — 74011250636 HC RX REV CODE- 250/636: Performed by: SPECIALIST

## 2020-01-14 PROCEDURE — 77030040684 HC NDL ENDOSC NEEDLEMASTR OCOA -B: Performed by: SPECIALIST

## 2020-01-14 PROCEDURE — 77030013996 HC SNR POLYP ENDOSC OCOA -B: Performed by: SPECIALIST

## 2020-01-14 PROCEDURE — 77030038665 HC SOL ELEVIEW INJ AGNT ARPH -B: Performed by: SPECIALIST

## 2020-01-14 PROCEDURE — 74011000250 HC RX REV CODE- 250: Performed by: NURSE ANESTHETIST, CERTIFIED REGISTERED

## 2020-01-14 PROCEDURE — 82962 GLUCOSE BLOOD TEST: CPT

## 2020-01-14 RX ORDER — LIDOCAINE HYDROCHLORIDE 20 MG/ML
INJECTION, SOLUTION EPIDURAL; INFILTRATION; INTRACAUDAL; PERINEURAL AS NEEDED
Status: DISCONTINUED | OUTPATIENT
Start: 2020-01-14 | End: 2020-01-14 | Stop reason: HOSPADM

## 2020-01-14 RX ORDER — PROPOFOL 10 MG/ML
INJECTION, EMULSION INTRAVENOUS AS NEEDED
Status: DISCONTINUED | OUTPATIENT
Start: 2020-01-14 | End: 2020-01-14 | Stop reason: HOSPADM

## 2020-01-14 RX ORDER — FLUMAZENIL 0.1 MG/ML
0.2 INJECTION INTRAVENOUS
Status: DISCONTINUED | OUTPATIENT
Start: 2020-01-14 | End: 2020-01-14 | Stop reason: HOSPADM

## 2020-01-14 RX ORDER — NALOXONE HYDROCHLORIDE 0.4 MG/ML
0.4 INJECTION, SOLUTION INTRAMUSCULAR; INTRAVENOUS; SUBCUTANEOUS
Status: DISCONTINUED | OUTPATIENT
Start: 2020-01-14 | End: 2020-01-14 | Stop reason: HOSPADM

## 2020-01-14 RX ORDER — SODIUM CHLORIDE 9 MG/ML
50 INJECTION, SOLUTION INTRAVENOUS CONTINUOUS
Status: DISCONTINUED | OUTPATIENT
Start: 2020-01-14 | End: 2020-01-14 | Stop reason: HOSPADM

## 2020-01-14 RX ORDER — SODIUM CHLORIDE 9 MG/ML
INJECTION, SOLUTION INTRAVENOUS
Status: DISCONTINUED | OUTPATIENT
Start: 2020-01-14 | End: 2020-01-14 | Stop reason: HOSPADM

## 2020-01-14 RX ORDER — SODIUM CHLORIDE 0.9 % (FLUSH) 0.9 %
5-40 SYRINGE (ML) INJECTION AS NEEDED
Status: DISCONTINUED | OUTPATIENT
Start: 2020-01-14 | End: 2020-01-14 | Stop reason: HOSPADM

## 2020-01-14 RX ORDER — ATROPINE SULFATE 0.1 MG/ML
0.5 INJECTION INTRAVENOUS
Status: DISCONTINUED | OUTPATIENT
Start: 2020-01-14 | End: 2020-01-14 | Stop reason: HOSPADM

## 2020-01-14 RX ORDER — DEXTROMETHORPHAN/PSEUDOEPHED 2.5-7.5/.8
1.2 DROPS ORAL
Status: DISCONTINUED | OUTPATIENT
Start: 2020-01-14 | End: 2020-01-14 | Stop reason: HOSPADM

## 2020-01-14 RX ORDER — EPINEPHRINE 0.1 MG/ML
1 INJECTION INTRACARDIAC; INTRAVENOUS
Status: DISCONTINUED | OUTPATIENT
Start: 2020-01-14 | End: 2020-01-14 | Stop reason: HOSPADM

## 2020-01-14 RX ORDER — SODIUM CHLORIDE 0.9 % (FLUSH) 0.9 %
5-40 SYRINGE (ML) INJECTION EVERY 8 HOURS
Status: DISCONTINUED | OUTPATIENT
Start: 2020-01-14 | End: 2020-01-14 | Stop reason: HOSPADM

## 2020-01-14 RX ADMIN — PROPOFOL 50 MG: 10 INJECTION, EMULSION INTRAVENOUS at 08:35

## 2020-01-14 RX ADMIN — SODIUM CHLORIDE: 900 INJECTION, SOLUTION INTRAVENOUS at 08:10

## 2020-01-14 RX ADMIN — PROPOFOL 50 MG: 10 INJECTION, EMULSION INTRAVENOUS at 08:25

## 2020-01-14 RX ADMIN — PROPOFOL 50 MG: 10 INJECTION, EMULSION INTRAVENOUS at 08:39

## 2020-01-14 RX ADMIN — PROPOFOL 50 MG: 10 INJECTION, EMULSION INTRAVENOUS at 08:37

## 2020-01-14 RX ADMIN — PROPOFOL 50 MG: 10 INJECTION, EMULSION INTRAVENOUS at 08:21

## 2020-01-14 RX ADMIN — PROPOFOL 50 MG: 10 INJECTION, EMULSION INTRAVENOUS at 08:23

## 2020-01-14 RX ADMIN — PROPOFOL 50 MG: 10 INJECTION, EMULSION INTRAVENOUS at 08:32

## 2020-01-14 RX ADMIN — PROPOFOL 100 MG: 10 INJECTION, EMULSION INTRAVENOUS at 08:18

## 2020-01-14 RX ADMIN — PROPOFOL 50 MG: 10 INJECTION, EMULSION INTRAVENOUS at 08:27

## 2020-01-14 RX ADMIN — LIDOCAINE HYDROCHLORIDE 40 MG: 20 INJECTION, SOLUTION EPIDURAL; INFILTRATION; INTRACAUDAL; PERINEURAL at 08:18

## 2020-01-14 RX ADMIN — PROPOFOL 50 MG: 10 INJECTION, EMULSION INTRAVENOUS at 08:29

## 2020-01-14 NOTE — PROCEDURES
[address]  [address] [facility]                 Colonoscopy Procedure Note    Indications:   See Preoperative Diagnosis above  Referring Physician: Felix Peralta MD  Anesthesia/Sedation: MAC anesthesia Propofol  Endoscopist:  Dr. Tatum Kern  Assistant:  Endoscopy Technician-1: Hernando Perla  Endoscopy RN-1: Nilsa Bell RN  Endoscopy RN-2: Josiah Naylor  Preoperative diagnosis: ELEVATED LIVER ENZYMES LEVEL, FAMILY HISTORY OF COLONIC POLYPS  Postoperative diagnosis: 1)transverse colon polyp    Procedure in Detail:  Informed consent was obtained for the procedure, including sedation. Risks of perforation, hemorrhage, adverse drug reaction, and aspiration were discussed. The patient was placed in the left lateral decubitus position. Based on the pre-procedure assessment, including review of the patient's medical history, medications, allergies, and review of systems, he had been deemed to be an appropriate candidate for moderate sedation; he was therefore sedated with the medications listed above. The patient was monitored continuously with ECG tracing, pulse oximetry, blood pressure monitoring, and direct observations. A rectal examination was performed. The [id number] was inserted into the rectum and advanced under direct vision to the cecum, which was identified by the ileocecal valve and appendiceal orifice. The quality of the colonic preparation was fair. A careful inspection was made as the colonoscope was withdrawn, including a retroflexed view of the rectum; findings and interventions are described below. Appropriate photodocumentation was obtained. Findings:  Rectum: normal  Sigmoid: normal  Descending Colon: normal  Transverse Colon: 12 mm sessile polyp was removed with hot snare after submucosal injection of 14 ml of [place].   Ascending Colon: normal  Cecum: normal      Specimens:    Colon polyp    EBL: None    Complications: None; patient tolerated the procedure well. Recommendations:     - Await pathology. - If adenoma is present, repeat colonoscopy in 3 years.         Signed By: Nilsa Fields MD                        January 14, 2020

## 2020-01-14 NOTE — ANESTHESIA POSTPROCEDURE EVALUATION
Post-Anesthesia Evaluation and Assessment    Patient: Mago Urena MRN: [id number]  SSN: [id number]    YOB: 1960  Age: 62 y.o. Sex: male      I have evaluated the patient and they are stable and ready for discharge from the PACU. Cardiovascular Function/Vital Signs  Visit Vitals  /72   Pulse 61   Temp 36.5 °C (97.7 °F)   Resp 13   Ht 6' (1.829 m)   Wt 82.6 kg (182 lb)   SpO2 100%   BMI 24.68 kg/m²       Patient is status post MAC anesthesia for Procedure(s):  COLONOSCOPY  ENDOSCOPIC POLYPECTOMY  INJECTION. Nausea/Vomiting: None    Postoperative hydration reviewed and adequate. Pain:  Pain Scale 1: Numeric (0 - 10) (01/14/20 0914)  Pain Intensity 1: 0 (01/14/20 0914)   Managed    Neurological Status: At baseline    Mental Status, Level of Consciousness: Alert and  oriented to person, place, and time    Pulmonary Status:   O2 Device: Room air (01/14/20 0914)   Adequate oxygenation and airway patent    Complications related to anesthesia: None    Post-anesthesia assessment completed. No concerns    Signed By: Julio Greenfield MD     January 14, 2020              Procedure(s):  COLONOSCOPY  ENDOSCOPIC POLYPECTOMY  INJECTION. MAC    <BSHSIANPOST>    Vitals Value Taken Time   /75 1/14/2020  9:19 AM   Temp 36.5 °C (97.7 °F) 1/14/2020  8:59 AM   Pulse 58 1/14/2020  9:21 AM   Resp 14 1/14/2020  9:21 AM   SpO2 97 % 1/14/2020  9:21 AM   Vitals shown include unvalidated device data.

## 2020-01-14 NOTE — DISCHARGE INSTRUCTIONS
Israel Carlson  [id number]  1960    COLON DISCHARGE INSTRUCTIONS  Discomfort:  Redness at IV site- apply warm compress to area; if redness or soreness persist- contact your physician  There may be a slight amount of blood passed from the rectum  Gaseous discomfort- walking, belching will help relieve any discomfort  You may not operate a vehicle for 12 hours  You may not engage in an occupation involving machinery or appliances for rest of today  You may not drink alcoholic beverages for at least 12 hours  Avoid making any critical decisions for at least 24 hour  DIET:   Regular diet. - however -  remember your colon is empty and a heavy meal will produce gas. Avoid these foods:  vegetables, fried / greasy foods, carbonated drinks for today. MEDICATIONS:      Regarding Aspirin or Nonsteroidal medications, please see below. ACTIVITY:  You may resume your normal daily activities it is recommended that you spend the remainder of the day resting -  avoid any strenuous activity. CALL M.D. ANY SIGN OF:  Increasing pain, nausea, vomiting  Abdominal distension (swelling)  New increased bleeding (oral or rectal)  Fever (chills)  Pain in chest area  Bloody discharge from nose or mouth  Shortness of breath    You may not  take any Advil, Aspirin, Ibuprofen, Motrin, Aleve, or Goodys for 10 days, ONLY  Tylenol as needed for pain.     Post procedure diagnosis: 1)transverse colon polyp      Follow-up Instructions:   Call Dr. Patrick Gillespie  Results of procedure / biopsy in 10 days  Telephone #  [phone]      DISCHARGE SUMMARY from Nurse    The following personal items collected during your admission are returned to you:   Dental Appliance: Dental Appliances: None  Vision: Visual Aid: None  Hearing Aid:    Jewelry:    Clothing:    Other Valuables:    Valuables sent to safe:

## 2020-01-14 NOTE — ROUTINE PROCESS
Damir Gave  1960  [id number]    Situation:  Verbal report received from: Pea Ridgecoleen Guzman  Procedure: Procedure(s):  COLONOSCOPY  ENDOSCOPIC POLYPECTOMY  INJECTION    Background:    Preoperative diagnosis: ELEVATED LIVER ENZYMES LEVEL, FAMILY HISTORY OF COLONIC POLYPS  Postoperative diagnosis: 1)transverse colon polyp    :  Dr. Sakshi Hebert  Assistant(s): Endoscopy Technician-1: Maye Alvarez  Endoscopy RN-1: Clifford Rivera RN  Endoscopy RN-2: Ralph RODRIGUEZ    Specimens:   ID Type Source Tests Collected by Time Destination   1 : transverse colon polyp Preservative Colon, Transverse  Brook Sinha MD 1/14/2020 1004 Pathology     H. Pylori  no    Assessment:  Intra-procedure medications     Anesthesia gave intra-procedure sedation and medications, see anesthesia flow sheet yes    Intravenous fluids: NS@ KVO     Vital signs stable yes    Abdominal assessment: round and soft yes    Recommendation:  Discharge patient per MD order yes.   Return to floor  Family or Friend wife  Permission to share finding with family or friend yes

## 2020-01-14 NOTE — H&P
Colonoscopy History and Physical      The patient was seen and examined. Date of last colonoscopy: none, Polyps  No      The airway was assessed and documented. The problem list, past medical history, and medications were reviewed. Patient Active Problem List   Diagnosis Code    Eczema L30.9    Erectile dysfunction N52.9    Diabetes mellitus ([facility] [address] [address] E11.9    Type 2 diabetes with nephropathy ([facility] E11.21    Dehydration E86.0    Diabetes ([facility] E11.9    ASHLEY (acute kidney injury) ([facility] N17.9    Tobacco abuse Z72.0    ETOH abuse F10.10     Social History     Socioeconomic History    Marital status: SINGLE     Spouse name: Not on file    Number of children: Not on file    Years of education: Not on file    Highest education level: Not on file   Occupational History    Not on file   Social Needs    Financial resource strain: Not on file    Food insecurity:     Worry: Not on file     Inability: Not on file    Transportation needs:     Medical: Not on file     Non-medical: Not on file   Tobacco Use    Smoking status: Former Smoker     Packs/day: 0.25     Last attempt to quit: 2019     Years since quittin.3    Smokeless tobacco: Current User   Substance and Sexual Activity    Alcohol use: Not Currently     Alcohol/week: 8.3 standard drinks     Types: 5 Cans of beer, 5 Standard drinks or equivalent per week     Comment: 3-4 beers a day and 1 shot vodka 3x/week    Drug use: Not Currently     Types: Cocaine, Heroin     Comment: remote hx heroine and cocaine    Sexual activity: Yes     Partners: Female     Comment: single,5 children. not working now   Lifestyle    Physical activity:     Days per week: Not on file     Minutes per session: Not on file    Stress: Not on file   Relationships    Social connections:     Talks on phone: Not on file     Gets together: Not on file     Attends Hoahaoism service: Not on file     Active member of club or organization: Not on file     Attends meetings of clubs or organizations: Not on file     Relationship status: Not on file    Intimate partner violence:     Fear of current or ex partner: Not on file     Emotionally abused: Not on file     Physically abused: Not on file     Forced sexual activity: Not on file   Other Topics Concern    Not on file   Social History Narrative    Not on file     Past Medical History:   Diagnosis Date    Arthritis     Diabetes ([facility] [address]     Diabetes mellitus ([facility] [address] 7/11/2012    Eczema 7/11/2012         Prior to Admission Medications   Prescriptions Last Dose Informant Patient Reported? Taking? Blood-Glucose Meter (RELION ALL-IN-ONE METER) monitoring kit 1/13/2020 at Unknown time  No Yes   Sig: Check BG 1-2 times/day   acetaminophen (TYLENOL) 325 mg tablet 1/7/2020 at Unknown time  No Yes   Sig: Take 2 Tabs by mouth three (3) times daily as needed for Pain. baclofen (LIORESAL) 20 mg tablet 1/7/2020 at Unknown time  No Yes   Sig: Take 1 Tab by mouth two (2) times daily as needed for Pain. diclofenac (VOLTAREN) 1 % gel 1/7/2020 at Unknown time  No Yes   Sig: Apply  to affected area two (2) times daily as needed for Pain. knee   empagliflozin (JARDIANCE) 10 mg tablet 1/7/2020 at Unknown time  No Yes   Sig: Take 1 Tab by mouth daily. ergocalciferol (ERGOCALCIFEROL) 50,000 unit capsule 1/7/2020 at Unknown time  No Yes   Sig: Take 1 Cap by mouth every seven (7) days. folic acid (FOLVITE) 1 mg tablet 1/7/2020 at Unknown time  Yes Yes   Sig: Take 1 Tab by mouth daily. glimepiride (AMARYL) 2 mg tablet 1/7/2020 at Unknown time  No Yes   Sig: Take 1 Tab by mouth two (2) times a day. glucose blood VI test strips (BLOOD GLUCOSE TEST) strip 1/7/2020 at Unknown time  No Yes   Sig: Check BG 1-2 times/day   ibuprofen (MOTRIN) 600 mg tablet 1/7/2020 at Unknown time  No Yes   Sig: Take 1 Tab by mouth every six (6) hours as needed for Pain.    lisinopril (PRINIVIL, ZESTRIL) 2.5 mg tablet 1/7/2020 at Unknown time  No Yes   Sig: Take 1 Tab by mouth daily. DC lisinopril 5 mg   metFORMIN (GLUCOPHAGE) 1,000 mg tablet 1/7/2020 at Unknown time  No Yes   Sig: Take 1 Tab by mouth two (2) times daily (with meals). multivitamin, tx-iron-ca-min (THERA-M W/ IRON) 9 mg iron-400 mcg tab tablet 1/7/2020 at Unknown time  Yes Yes   Sig: Take 1 Tab by mouth daily. tadalafil (CIALIS) 5 mg tablet 1/7/2020 at Unknown time  No Yes   Sig: Take 1 Tab by mouth daily as needed (ED). thiamine mononitrate (B-1) 100 mg tablet 1/7/2020 at Unknown time  Yes Yes   Sig: Take 1 Tab by mouth daily. Facility-Administered Medications: None       The patient was seen and examined in the endoscopy suite. The airway was assessed and docuemented. The problem list and medications were reviewed. Chief complaint, history of present illness, and review of systems and Past medical History are positive for: colon cancer screening and hepatitis C and family history of cpolyps    The heart, lungs and mental status were satisfactory for the administration of sedation and for the procedure. I discussed with the patient the objectives, risks, consequences and alternatives to the procedure.      Plan: Endoscopic procedure with sedation     Nilsa Fields MD   1/14/2020  8:16 AM

## 2020-01-14 NOTE — PERIOP NOTES

## 2020-01-14 NOTE — ANESTHESIA PREPROCEDURE EVALUATION
Relevant Problems   No relevant active problems       Anesthetic History   No history of anesthetic complications            Review of Systems / Medical History  Patient summary reviewed, nursing notes reviewed and pertinent labs reviewed    Pulmonary          Smoker         Neuro/Psych   Within defined limits           Cardiovascular  Within defined limits                     GI/Hepatic/Renal       Hepatitis: type C         Endo/Other    Diabetes    Arthritis     Other Findings            Physical Exam    Airway  Mallampati: II  TM Distance: > 6 cm  Neck ROM: normal range of motion   Mouth opening: Normal     Cardiovascular  Regular rate and rhythm,  S1 and S2 normal,  no murmur, click, rub, or gallop             Dental  No notable dental hx       Pulmonary  Breath sounds clear to auscultation               Abdominal  GI exam deferred       Other Findings            Anesthetic Plan    ASA: 2  Anesthesia type: MAC            Anesthetic plan and risks discussed with: Patient

## 2020-01-17 ENCOUNTER — TELEPHONE (OUTPATIENT)
Dept: HEMATOLOGY | Age: 60
End: 2020-01-17

## 2020-01-17 NOTE — TELEPHONE ENCOUNTER
Left voicemail to pt to scheduled New Pt appointment with DR Riddhi Castro.  Diagnosis: Hep C  Referred by: [facility]

## 2020-01-25 ENCOUNTER — HOSPITAL ENCOUNTER (EMERGENCY)
Age: 60
Discharge: HOME | End: 2020-01-25
Attending: EMERGENCY MEDICINE
Payer: MEDICAID

## 2020-01-25 VITALS
DIASTOLIC BLOOD PRESSURE: 86 MMHG | HEIGHT: 72 IN | WEIGHT: 176 LBS | BODY MASS INDEX: 23.84 KG/M2 | HEART RATE: 80 BPM | RESPIRATION RATE: 16 BRPM | OXYGEN SATURATION: 98 % | TEMPERATURE: 98.3 F | SYSTOLIC BLOOD PRESSURE: 133 MMHG

## 2020-01-25 DIAGNOSIS — H60.502 ACUTE OTITIS EXTERNA OF LEFT EAR, UNSPECIFIED TYPE: ICD-10-CM

## 2020-01-25 DIAGNOSIS — H66.90 ACUTE OTITIS MEDIA, UNSPECIFIED OTITIS MEDIA TYPE: Primary | ICD-10-CM

## 2020-01-25 PROCEDURE — 74011250637 HC RX REV CODE- 250/637: Performed by: EMERGENCY MEDICINE

## 2020-01-25 PROCEDURE — 99283 EMERGENCY DEPT VISIT LOW MDM: CPT

## 2020-01-25 RX ORDER — NEOMYCIN SULFATE, POLYMYXIN B SULFATE, HYDROCORTISONE 3.5; 10000; 1 MG/ML; [USP'U]/ML; MG/ML
4 SOLUTION/ DROPS AURICULAR (OTIC)
Status: COMPLETED | OUTPATIENT
Start: 2020-01-25 | End: 2020-01-25

## 2020-01-25 RX ORDER — CEFDINIR 300 MG/1
300 CAPSULE ORAL 2 TIMES DAILY
Qty: 14 CAP | Refills: 0 | Status: SHIPPED | OUTPATIENT
Start: 2020-01-25 | End: 2020-02-01

## 2020-01-25 RX ORDER — CIPROFLOXACIN AND DEXAMETHASONE 3; 1 MG/ML; MG/ML
4 SUSPENSION/ DROPS AURICULAR (OTIC)
Status: DISCONTINUED | OUTPATIENT
Start: 2020-01-25 | End: 2020-01-25 | Stop reason: CLARIF

## 2020-01-25 RX ORDER — NEOMYCIN SULFATE, POLYMYXIN B SULFATE, HYDROCORTISONE 3.5; 10000; 1 MG/ML; [USP'U]/ML; MG/ML
3-4 SOLUTION/ DROPS AURICULAR (OTIC) 4 TIMES DAILY
Qty: 10 ML | Refills: 0 | Status: SHIPPED | OUTPATIENT
Start: 2020-01-25 | End: 2020-02-01

## 2020-01-25 RX ORDER — IBUPROFEN 400 MG/1
800 TABLET ORAL
Status: DISCONTINUED | OUTPATIENT
Start: 2020-01-25 | End: 2020-01-25 | Stop reason: HOSPADM

## 2020-01-25 RX ADMIN — NEOMYCIN SULFATE, POLYMYXIN B SULFATE AND HYDROCORTISONE 4 DROP: 3.5; 10000; 1 SOLUTION AURICULAR (OTIC) at 10:00

## 2020-01-25 NOTE — DISCHARGE INSTRUCTIONS
Patient Education        Ear Infection (Otitis Media): Care Instructions  Your Care Instructions    An ear infection may start with a cold and affect the middle ear (otitis media). It can hurt a lot. Most ear infections clear up on their own in a couple of days. Most often you will not need antibiotics. This is because many ear infections are caused by a virus. Antibiotics don't work against a virus. Regular doses of pain medicines are the best way to reduce your fever and help you feel better. Follow-up care is a key part of your treatment and safety. Be sure to make and go to all appointments, and call your doctor if you are having problems. It's also a good idea to know your test results and keep a list of the medicines you take. How can you care for yourself at home? · Take pain medicines exactly as directed. ? If the doctor gave you a prescription medicine for pain, take it as prescribed. ? If you are not taking a prescription pain medicine, take an over-the-counter medicine, such as acetaminophen (Tylenol), ibuprofen (Advil, Motrin), or naproxen (Aleve). Read and follow all instructions on the label. ? Do not take two or more pain medicines at the same time unless the doctor told you to. Many pain medicines have acetaminophen, which is Tylenol. Too much acetaminophen (Tylenol) can be harmful. · Plan to take a full dose of pain reliever before bedtime. Getting enough sleep will help you get better. · Try a warm, moist washcloth on the ear. It may help relieve pain. · If your doctor prescribed antibiotics, take them as directed. Do not stop taking them just because you feel better. You need to take the full course of antibiotics. When should you call for help?   Call your doctor now or seek immediate medical care if:    · You have new or increasing ear pain.     · You have new or increasing pus or blood draining from your ear.     · You have a fever with a stiff neck or a severe headache.    Watch closely for changes in your health, and be sure to contact your doctor if:    · You have new or worse symptoms.     · You are not getting better after taking an antibiotic for 2 days. Where can you learn more? Go to http://ajit-david.info/. Enter N485 in the search box to learn more about \"Ear Infection (Otitis Media): Care Instructions. \"  Current as of: October 21, 2018  Content Version: 12.2  © 6157-2066 [employer]. Care instructions adapted under license by [employer] (which disclaims liability or warranty for this information). If you have questions about a medical condition or this instruction, always ask your healthcare professional. [place] [address] any warranty or liability for your use of this information. Patient Education        Swimmer's Ear: Care Instructions  Your Care Instructions    Swimmer's ear (otitis externa) is inflammation or infection of the ear canal. This is the passage that leads from the outer ear to the eardrum. Any water, sand, or other debris that gets into the ear canal and stays there can cause swimmer's ear. Putting cotton swabs or other items in the ear to clean it can also cause this problem. Swimmer's ear can be very painful. But you can treat the pain and infection with medicines. You should feel better in a few days. Follow-up care is a key part of your treatment and safety. Be sure to make and go to all appointments, and call your doctor if you are having problems. It's also a good idea to know your test results and keep a list of the medicines you take. How can you care for yourself at home? Cleaning and care  · Use antibiotic drops as your doctor directs. · Do not insert ear drops (other than the antibiotic ear drops) or anything else into the ear unless your doctor has told you to. · Avoid getting water in the ear until the problem clears up.  Use cotton lightly coated with petroleum jelly as an earplug. Do not use plastic earplugs. · Use a hair dryer set on low to carefully dry the ear after you shower. · To ease ear pain, hold a warm washcloth against your ear. · Take pain medicines exactly as directed. ? If the doctor gave you a prescription medicine for pain, take it as prescribed. ? If you are not taking a prescription pain medicine, ask your doctor if you can take an over-the-counter medicine. Inserting ear drops  · Warm the drops to body temperature by rolling the container in your hands. Or you can place it in a cup of warm water for a few minutes. · Lie down, with your ear facing up. · Place drops inside the ear. Follow your doctor's instructions (or the directions on the label) for how many drops to use. Gently wiggle the outer ear or pull the ear up and back to help the drops get into the ear. · It's important to keep the liquid in the ear canal for 3 to 5 minutes. When should you call for help? Call your doctor now or seek immediate medical care if:    · You have a new or higher fever.     · You have new or worse pain, swelling, warmth, or redness around or behind your ear.     · You have new or increasing pus or blood draining from your ear.    Watch closely for changes in your health, and be sure to contact your doctor if:    · You are not getting better after 2 days (48 hours). Where can you learn more? Go to http://ajit-david.info/. Enter C706 in the search box to learn more about \"Swimmer's Ear: Care Instructions. \"  Current as of: October 21, 2018  Content Version: 12.2  © 1324-2634 [employer]. Care instructions adapted under license by [employer] (which disclaims liability or warranty for this information). If you have questions about a medical condition or this instruction, always ask your healthcare professional. [address] any warranty or liability for your use of this information.

## 2020-01-25 NOTE — ED NOTES
Patient states he is here today with c/o left ear pain x 1 week. He states he has also noticed some dried blood in the ear. He denies any known trauma. Emergency Department Nursing Plan of Care       The Nursing Plan of Care is developed from the Nursing assessment and Emergency Department Attending provider initial evaluation. The plan of care may be reviewed in the ED Provider note.     The Plan of Care was developed with the following considerations:   Patient / Family readiness to learn indicated by:verbalized understanding  Persons(s) to be included in education: patient  Barriers to Learning/Limitations:No    Signed     Robin Stiles RN    1/25/2020   9:42 AM

## 2020-01-25 NOTE — ED PROVIDER NOTES
67-year-old male with a history of diabetes presents with left ear pain since yesterday. Prior to the onset of pain, about 2 days ago, he noticed some bleeding from that ear. Used a Q-tip and was able to remove most of the blood as well as some cerumen. Comes today because the pain persists. Denies current bleeding or discharge. Denies rhinorrhea, congestion, sinus pressure, fever, nausea, vomiting, known ear injury or foreign body. Ear Pain    Associated symptoms include ear discharge. Pertinent negatives include no abdominal pain, no diarrhea, no vomiting and no rash.         Past Medical History:   Diagnosis Date    Arthritis     [facility] [address]     Diabetes mellitus ([facility] [address] 2012    Eczema 2012       Past Surgical History:   Procedure Laterality Date    COLONOSCOPY N/A 2020    COL[facility] ENDOSCOPY    HX ORTHOPAEDIC      L knee scope         Family History:   Problem Relation Age of Onset    Hypertension Mother    [employer] Arthritis-osteo Mother     No Known Problems Father     Diabetes Sister        Social History     Socioeconomic History    Marital status: SINGLE     Spouse name: Not on file    Number of children: Not on file    Years of education: Not on file    Highest education level: Not on file   Occupational History    Not on file   Social Needs    Financial resource strain: Not on file    Food insecurity:     Worry: Not on file     Inability: Not on file    Transportation needs:     Medical: Not on file     Non-medical: Not on file   Tobacco Use    Smoking status: Former Smoker     Packs/day: 0.25     Last attempt to quit: 2019     Years since quittin.3    Smokeless tobacco: Current User   Substance and Sexual Activity    Alcohol use: Not Currently     Alcohol/week: 8.3 standard drinks     Types: 5 Cans of beer, 5 Standard drinks or equivalent per week     Comment: 3-4 beers a day and 1 shot vodka 3x/week    Drug use: Not Currently     Types: Cocaine, Heroin     Comment: remote hx heroine and cocaine    Sexual activity: Yes     Partners: Female     Comment: single,5 children. not working now   Lifestyle    Physical activity:     Days per week: Not on file     Minutes per session: Not on file    Stress: Not on file   Relationships    Social connections:     Talks on phone: Not on file     Gets together: Not on file     Attends Pentecostal service: Not on file     Active member of club or organization: Not on file     Attends meetings of clubs or organizations: Not on file     Relationship status: Not on file    Intimate partner violence:     Fear of current or ex partner: Not on file     Emotionally abused: Not on file     Physically abused: Not on file     Forced sexual activity: Not on file   Other Topics Concern    Not on file   Social History Narrative    Not on file         ALLERGIES: Doxycycline; Tramadol; and Pcn [penicillins]    Review of Systems   Constitutional: Negative. Negative for fever. HENT: Positive for ear discharge and ear pain. Negative for drooling, facial swelling and trouble swallowing. Eyes: Negative. Negative for discharge and redness. Respiratory: Negative. Negative for chest tightness, shortness of breath and wheezing. Cardiovascular: Negative. Negative for chest pain. Gastrointestinal: Negative. Negative for abdominal distention, abdominal pain, constipation, diarrhea, nausea and vomiting. Endocrine: Negative. Genitourinary: Negative. Negative for difficulty urinating and dysuria. Musculoskeletal: Negative. Negative for arthralgias and myalgias. Skin: Negative. Negative for color change and rash. Allergic/Immunologic: Negative. Neurological: Negative. Negative for syncope, facial asymmetry and speech difficulty. Hematological: Negative. Psychiatric/Behavioral: Negative. Negative for agitation and confusion.    All other systems reviewed and are negative. Vitals:    01/25/20 0912   BP: 135/87   Pulse: 80   Resp: 16   Temp: 98.3 °F (36.8 °C)   SpO2: 100%   Weight: 79.8 kg (176 lb)   Height: 6' (1.829 m)            Physical Exam  Vitals signs and nursing note reviewed. Constitutional:       Appearance: Normal appearance. He is well-developed. HENT:      Head: Normocephalic and atraumatic. Left Ear: A middle ear effusion is present. Tympanic membrane is injected. Ears:      Comments: Swelling and erythema of left ear canal.  Opacified left TM. Eyes:      Conjunctiva/sclera: Conjunctivae normal.   Neck:      Musculoskeletal: Neck supple. Cardiovascular:      Rate and Rhythm: Normal rate and regular rhythm. Pulmonary:      Effort: No accessory muscle usage or respiratory distress. Abdominal:      Palpations: Abdomen is soft. Tenderness: There is no tenderness. Musculoskeletal: Normal range of motion. Skin:     General: Skin is warm and dry. Neurological:      Mental Status: He is alert and oriented to person, place, and time. Psychiatric:         Behavior: Behavior normal.         Thought Content: Thought content normal.          MDM       Procedures    LABORATORY TESTS:  No results found for this or any previous visit (from the past 12 hour(s)). IMAGING RESULTS:  No orders to display       MEDICATIONS GIVEN:  Medications   ibuprofen (MOTRIN) tablet 800 mg (800 mg Oral Refused 1/25/20 0957)   neomycin-polymyxin-hydrocortisone (CORTISPORIN) 3.5-10,000-1 mg/mL-unit/mL-% otic solution 4 Drop (4 Drops Left Ear Given 1/25/20 1000)       IMPRESSION:  1. Acute otitis media, unspecified otitis media type    2. Acute otitis externa of left ear, unspecified type        PLAN:  1. Discharge Medication List as of 1/25/2020  9:50 AM      START taking these medications    Details   neomycin-polymyxin-hydrocortisone (CORTISPORIN) otic solution Administer 3-4 Drops in left ear four (4) times daily for 7 days. , Print, Disp-10 mL, R-0 cefdinir (OMNICEF) 300 mg capsule Take 1 Cap by mouth two (2) times a day for 7 days. , Print, Disp-14 Cap, R-0         CONTINUE these medications which have NOT CHANGED    Details   ergocalciferol (ERGOCALCIFEROL) 50,000 unit capsule Take 1 Cap by mouth every seven (7) days. , Normal, Disp-4 Cap, R-3      empagliflozin (JARDIANCE) 10 mg tablet Take 1 Tab by mouth daily. , Normal, Disp-30 Tab, R-5      baclofen (LIORESAL) 20 mg tablet Take 1 Tab by mouth two (2) times daily as needed for Pain., Normal, Disp-30 Tab, R-0      acetaminophen (TYLENOL) 325 mg tablet Take 2 Tabs by mouth three (3) times daily as needed for Pain., Normal, Disp-60 Tab, R-2      lisinopril (PRINIVIL, ZESTRIL) 2.5 mg tablet Take 1 Tab by mouth daily. DC lisinopril 5 mg, Normal, Disp-30 Tab, R-5      ibuprofen (MOTRIN) 600 mg tablet Take 1 Tab by mouth every six (6) hours as needed for Pain., Normal, Disp-10 Tab, R-0      diclofenac (VOLTAREN) 1 % gel Apply  to affected area two (2) times daily as needed for Pain. knee, Normal, Disp-100 g, R-2      metFORMIN (GLUCOPHAGE) 1,000 mg tablet Take 1 Tab by mouth two (2) times daily (with meals). , Normal, Disp-60 Tab, R-6      glimepiride (AMARYL) 2 mg tablet Take 1 Tab by mouth two (2) times a day., Normal, Disp-60 Tab, R-6      folic acid (FOLVITE) 1 mg tablet Take 1 Tab by mouth daily. , OTC, Disp-30 Tab, R-0      multivitamin, tx-iron-ca-min (THERA-M W/ IRON) 9 mg iron-400 mcg tab tablet Take 1 Tab by mouth daily. , OTC, Disp-30 Tab, R-0      thiamine mononitrate (B-1) 100 mg tablet Take 1 Tab by mouth daily. , OTC, Disp-30 Tab, R-0      tadalafil (CIALIS) 5 mg tablet Take 1 Tab by mouth daily as needed (ED)., Normal, Disp-10 Tab, R-0      glucose blood VI test strips (BLOOD GLUCOSE TEST) strip Check BG 1-2 times/day, Normal, Disp-100 Strip, R-11      Blood-Glucose Meter (RELION ALL-IN-ONE METER) monitoring kit Check BG 1-2 times/day, Normal, Disp-1 Kit, R-0           2.    Follow-up Information Follow up With Specialties Details Why Contact Info    Shakeel Del Castillo MD Internal Medicine Schedule an appointment as soon as possible for a visit  [address]  [address] [address] [phone]      [address] [place] [address] EMERGENCY DEPT Emergency Medicine  As needed, If symptoms worsen [place]  [phone]        Return to ED if worse

## 2020-02-27 ENCOUNTER — OFFICE VISIT (OUTPATIENT)
Dept: HEMATOLOGY | Age: 60
End: 2020-02-27

## 2020-02-27 VITALS
SYSTOLIC BLOOD PRESSURE: 127 MMHG | OXYGEN SATURATION: 96 % | WEIGHT: 173.2 LBS | HEART RATE: 74 BPM | TEMPERATURE: 97.9 F | BODY MASS INDEX: 23.46 KG/M2 | DIASTOLIC BLOOD PRESSURE: 67 MMHG | HEIGHT: 72 IN | RESPIRATION RATE: 17 BRPM

## 2020-02-27 DIAGNOSIS — B18.2 CHRONIC HEPATITIS C WITHOUT HEPATIC COMA (HCC): Primary | ICD-10-CM

## 2020-02-27 NOTE — PROGRESS NOTES
[address] [place] [address], MD, Artur Slater MD Julianna Fiedler, SANDIRTA Turner, [facility]     April S Monica, [facility]   Isa Mendes, FNP-C    Antonio Martinez, [facility]       Jessica Carreno [address]    at [address], [address] [place] [address], [facility] 22.    [phone]    FAX: [address] [place] [address]    at [address], [address]    [phone]    FAX: [phone]     Patient Care Team:  Yulissa Torres MD as PCP - General (Internal Medicine)  Yulissa Torres MD as PCP - [facility] Empaneled Provider  Larose Simmonds, MD as Consulting Provider (Endocrinology)    Patient Active Problem List   Diagnosis Code    Eczema L30.9    Erectile dysfunction N52.9    Diabetes mellitus ([address] E11.9    Type 2 diabetes with nephropathy ([address] E11.21    Dehydration E86.0    Diabetes ([address] E11.9    ASHLEY (acute kidney injury) ([address] N17.9    Tobacco abuse Z72.0    ETOH abuse F10.10     The clinicians listed above have asked me to see Erika Kohler in consultation regarding chronic HCV and its management. All medical records sent by the referring physicians were reviewed. The patient is a 62 y.o. Black male who was found to have abnormalities in liver chemistries and subsequently tested positive for chronic HCV in 2019. Risk factor for acquiring HCV is IV drug use. Last use was 2003. There was no history of acute icteric hepatitis at the time of these risk factors. Imaging of the liver was either not performed or the results are not available to me. An assessment of liver fibrosis with biopsy or elastography has not been performed. The patient has never received treatment for chronic HCV.       The patient has no symptoms which can be attributed to the liver disorder. The patient has not experienced the following symptoms: arthralgias. The patient has mild limitations in functional activities which can be attributed to other medical problems not related to the liver disease. ASSESSMENT AND PLAN:  Chronic HCV   Chronic HCV of unclear severity. Liver transaminases are normal. ALP is normal. Liver function is normal. Total bilirubin is   normal. Serum albumin is normal. The platelet count is normal.     Based upon laboratory studies, the patient does not appear to have advanced liver disease. Will perform laboratory testing to monitor liver function and degree of liver injury. This included BMP, hepatic panel, CBC with platelet count and INR. Will perform and/or review results of HCV viral load, HCV genotype to define the specific treatment and duration of treatment that will be required. Will perform serologic and virologic studies to assess for other causes of chronic liver disease. Will perform imaging of the liver with ultrasound. The degree of fibrosis will be assessed by FibroSure. Chronic HCV treatment  The patient has not been treated for HCV. The patient has HCV genotype that is not yet defined. Discussed the treatment alternatives. The SVR/cure rate for HCV now exceeds 97% without significant side effects for most patients with HCV. The specific treatment is dependent upon genotype, viral load and histology. The patient should be treated with [facility], as it is preferred by Medicaid. He signed the treatment agreement in the office with me today. The SVR/cure rate for is over 95%. Screening for hepatocellular carcinoma  HCC screening is not necessary if the patient has no evidence of cirrhosis.     Treatment of other medical problems in patients with chronic liver disease  There are no contraindications for the patient to take most medications necessary for treatment of other medical issues. The patient can take any medications utilized for treatment of DM and/or statins to treat hypercholesterolemia. The patient does not consume alcohol on a daily basis. Normal doses of acetaminophen, as recommended on the label of the bottle, are not hepatotoxic except in the setting of daily alcohol use. Even patients with cirrhosis can utilize acetaminophen for pain. Counseling for alcohol in patients with chronic liver disease  The patient was counseled regarding alcohol consumption and the effect of alcohol on chronic liver disease. The patient was reminded alcohol can cause fatty liver. Vaccinations   The need for vaccination against viral hepatitis A and B will be assessed with serologic and instituted as appropriate. Routine vaccinations against other bacterial and viral agents can be performed as indicated. Annual flu vaccination should be administered if indicated. ALLERGIES  No Known Allergies    Current Outpatient Medications on File Prior to Visit   Medication Sig Dispense Refill    ergocalciferol (ERGOCALCIFEROL) 50,000 unit capsule Take 1 Cap by mouth every seven (7) days. 4 Cap 3    empagliflozin (JARDIANCE) 10 mg tablet Take 1 Tab by mouth daily. 30 Tab 5    acetaminophen (TYLENOL) 325 mg tablet Take 2 Tabs by mouth three (3) times daily as needed for Pain. 60 Tab 2    lisinopril (PRINIVIL, ZESTRIL) 2.5 mg tablet Take 1 Tab by mouth daily. DC lisinopril 5 mg 30 Tab 5    diclofenac (VOLTAREN) 1 % gel Apply  to affected area two (2) times daily as needed for Pain. knee 100 g 2    metFORMIN (GLUCOPHAGE) 1,000 mg tablet Take 1 Tab by mouth two (2) times daily (with meals). 60 Tab 6    glimepiride (AMARYL) 2 mg tablet Take 1 Tab by mouth two (2) times a day. 60 Tab 6    multivitamin, tx-iron-ca-min (THERA-M W/ IRON) 9 mg iron-400 mcg tab tablet Take 1 Tab by mouth daily. 30 Tab 0    tadalafil (CIALIS) 5 mg tablet Take 1 Tab by mouth daily as needed (ED).  10 Tab 0    glucose blood VI test strips (BLOOD GLUCOSE TEST) strip Check BG 1-2 times/day 100 Strip 11    Blood-Glucose Meter (RELION ALL-IN-ONE METER) monitoring kit Check BG 1-2 times/day 1 Kit 0    baclofen (LIORESAL) 20 mg tablet Take 1 Tab by mouth two (2) times daily as needed for Pain. 30 Tab 0    ibuprofen (MOTRIN) 600 mg tablet Take 1 Tab by mouth every six (6) hours as needed for Pain. 10 Tab 0    folic acid (FOLVITE) 1 mg tablet Take 1 Tab by mouth daily. 30 Tab 0    thiamine mononitrate (B-1) 100 mg tablet Take 1 Tab by mouth daily. 30 Tab 0     No current facility-administered medications on file prior to visit. SYSTEM REVIEW NOT RELATED TO LIVER DISEASE OR REVIEWED ABOVE:  Constitution systems: Negative for fever, chills, weight gain, weight loss. Eyes: Negative for visual changes. ENT: Negative for sore throat, painful swallowing. Respiratory: Negative for cough, hemoptysis, SOB. Cardiology: Negative for chest pain, palpitations. GI:  Negative for constipation or diarrhea. : Negative for urinary frequency, dysuria, hematuria, nocturia. Skin: Negative for rash. Hematology: Negative for easy bruising, blood clots. Musculo-skeletal: Negative for back pain, muscle pain, weakness. Neurologic: Negative for headaches, dizziness, vertigo, memory problems not related to HE. Psychology: Negative for anxiety, depression. FAMILY HISTORY:  The father  from unknown cause while the patient was in prison. The mother is alive with hypertension and dementia. There is no family history of liver disease. His brother  from [address] [place] [address]. There is no family history of immune disorders. SOCIAL HISTORY:  The patient has a girlfriend. The patient has 5 children and lots of grandchildren. The patient smokes 1/2 PPD. The patient drinks 2-3 beers about 3 times a week. The patient collects SSI due to arthritis.      PHYSICAL EXAMINATION:  Visit Vitals  /67 (BP 1 Location: Left arm, BP Patient Position: Sitting)   Pulse 74   Temp 97.9 °F (36.6 °C) (Tympanic)   Resp 17   Ht 6' (1.829 m)   Wt 173 lb 3.2 oz (78.6 kg)   SpO2 96%   BMI 23.49 kg/m²     General: No acute distress. Eyes: Sclera anicteric. ENT: No oral lesions. Nodes: No adenopathy. Skin: No spider angiomata. No jaundice. No palmar erythema. Respiratory: Lungs clear to auscultation. Cardiovascular: Regular heart rate. No murmurs. No JVD. Abdomen: Soft non-tender. Liver size normal to percussion/palpation. Spleen not palpable. No obvious ascites. Extremities: No edema. No muscle wasting. No gross arthritic changes. Neurologic: Alert and oriented. Cranial nerves grossly intact. No asterixis. LABORATORY STUDIES:  From 11/2019  AST/ALT/ALP/T Bili/ALB: 26/33/66/0.3/4.5  BUN/CREAT: 14/0.60    SEROLOGIES:  11/2019. HCV RNA qual positive. LIVER HISTOLOGY:  Not available or performed    ENDOSCOPIC PROCEDURES:  Not available or performed    RADIOLOGY:  Not available or performed    OTHER TESTING:  Not available or performed    FOLLOW-UP:  All of the issues listed above in the assessment and plan were discussed with the patient. All questions were answered. The patient expressed a clear understanding of the above. [facility] [id number] in 4 weeks to review all data and determine the treatment plan. If the HCV RNA is positive, I will likely order the medication and cancel the follow up appointment already scheduled. If the viral load is negative, I will keep the appointment scheduled so I can confirm the negative viral load.      GRAZYNA Cherry-BC  Liver [place] [address], [address] [facility], [facility] 22. [phone]

## 2020-02-27 NOTE — PROGRESS NOTES
Chief Complaint   Patient presents with    New Patient     HCV Treatment     Visit Vitals  /67 (BP 1 Location: Left arm, BP Patient Position: Sitting)   Pulse 74   Temp 97.9 °F (36.6 °C) (Tympanic)   Resp 17   Ht 6' (1.829 m)   Wt 173 lb 3.2 oz (78.6 kg)   SpO2 96%   BMI 23.49 kg/m²     3 most recent PHQ Screens 11/6/2019   Little interest or pleasure in doing things Not at all   Feeling down, depressed, irritable, or hopeless Not at all   Total Score PHQ 2 0     Abuse Screening Questionnaire 11/6/2019   Do you ever feel afraid of your partner? N   Are you in a relationship with someone who physically or mentally threatens you? N   Is it safe for you to go home?  Y     Learning Assessment 9/11/2019   PRIMARY LEARNER Patient   CO-LEARNER CAREGIVER No   PRIMARY LANGUAGE ENGLISH   LEARNER PREFERENCE PRIMARY DEMONSTRATION     PICTURES   ANSWERED BY Patient   RELATIONSHIP SELF

## 2020-02-28 ENCOUNTER — TELEPHONE (OUTPATIENT)
Dept: HEMATOLOGY | Age: 60
End: 2020-02-28

## 2020-02-28 LAB
ALBUMIN SERPL-MCNC: 4.2 G/DL (ref 3.8–4.8)
ALP SERPL-CCNC: 68 IU/L (ref 39–117)
ALT SERPL-CCNC: 47 IU/L (ref 0–44)
AST SERPL-CCNC: 37 IU/L (ref 0–40)
BILIRUB DIRECT SERPL-MCNC: 0.13 MG/DL (ref 0–0.4)
BILIRUB SERPL-MCNC: 0.4 MG/DL (ref 0–1.2)
BUN SERPL-MCNC: 14 MG/DL (ref 8–27)
BUN/CREAT SERPL: 18 (ref 10–24)
CALCIUM SERPL-MCNC: 9.8 MG/DL (ref 8.6–10.2)
CHLORIDE SERPL-SCNC: 102 MMOL/L (ref 96–106)
CO2 SERPL-SCNC: 24 MMOL/L (ref 20–29)
CREAT SERPL-MCNC: 0.8 MG/DL (ref 0.76–1.27)
ERYTHROCYTE [DISTWIDTH] IN BLOOD BY AUTOMATED COUNT: 13.9 % (ref 11.6–15.4)
GLUCOSE SERPL-MCNC: 116 MG/DL (ref 65–99)
HAV AB SER QL IA: POSITIVE
HBV CORE AB SERPL QL IA: NEGATIVE
HBV SURFACE AB SER QL: NON REACTIVE
HBV SURFACE AG SERPL QL IA: NEGATIVE
HCT VFR BLD AUTO: 47.4 % (ref 37.5–51)
HGB BLD-MCNC: 15.3 G/DL (ref 13–17.7)
HIV 1+2 AB+HIV1 P24 AG SERPL QL IA: NON REACTIVE
INR PPP: 1 (ref 0.8–1.2)
MCH RBC QN AUTO: 27.3 PG (ref 26.6–33)
MCHC RBC AUTO-ENTMCNC: 32.3 G/DL (ref 31.5–35.7)
MCV RBC AUTO: 85 FL (ref 79–97)
PLATELET # BLD AUTO: 129 X10E3/UL (ref 150–450)
POTASSIUM SERPL-SCNC: 4.4 MMOL/L (ref 3.5–5.2)
PROT SERPL-MCNC: 7.9 G/DL (ref 6–8.5)
PROTHROMBIN TIME: 10.1 SEC (ref 9.1–12)
RBC # BLD AUTO: 5.6 X10E6/UL (ref 4.14–5.8)
SODIUM SERPL-SCNC: 141 MMOL/L (ref 134–144)
WBC # BLD AUTO: 4.7 X10E3/UL (ref 3.4–10.8)

## 2020-02-29 LAB
A2 MACROGLOB SERPL-MCNC: 290 MG/DL (ref 110–276)
ALT SERPL W P-5'-P-CCNC: 62 IU/L (ref 0–55)
APO A-I SERPL-MCNC: 200 MG/DL (ref 101–178)
BILIRUB SERPL-MCNC: 0.2 MG/DL (ref 0–1.2)
COMMENT: ABNORMAL
FIBROSIS SCORING:, 550107: ABNORMAL
FIBROSIS STAGE SERPL QL: ABNORMAL
GGT SERPL-CCNC: 271 IU/L (ref 0–65)
HAPTOGLOB SERPL-MCNC: 196 MG/DL (ref 32–363)
INTERPRETATION, 550106: ABNORMAL
LIVER FIBR SCORE SERPL CALC.FIBROSURE: 0.31 (ref 0–0.21)
NECROINFLAMM ACTIVITY SCORING:, 550121: ABNORMAL
NECROINFLAMMATORY ACT GRADE SERPL QL: ABNORMAL
NECROINFLAMMATORY ACT SCORE SERPL: 0.37 (ref 0–0.17)
SERVICE CMNT-IMP: ABNORMAL

## 2020-03-01 LAB
HCV GENTYP SERPL NAA+PROBE: NORMAL
HCV RNA SERPL NAA+PROBE-ACNC: NORMAL IU/ML
HCV RNA SERPL NAA+PROBE-ACNC: NORMAL IU/ML
HCV RNA SERPL NAA+PROBE-LOG IU: 7.15 {LOG_IU}/ML
HCV RNA SERPL QL NAA+PROBE: POSITIVE
PLEASE NOTE, 550474: NORMAL
TEST INFORMATION: NORMAL

## 2020-03-10 ENCOUNTER — HOSPITAL ENCOUNTER (OUTPATIENT)
Dept: ULTRASOUND IMAGING | Age: 60
Discharge: HOME | End: 2020-03-10
Attending: NURSE PRACTITIONER
Payer: MEDICAID

## 2020-03-10 DIAGNOSIS — B18.2 CHRONIC HEPATITIS C WITHOUT HEPATIC COMA (HCC): ICD-10-CM

## 2020-03-10 PROCEDURE — 76700 US EXAM ABDOM COMPLETE: CPT

## 2020-03-23 ENCOUNTER — DOCUMENTATION ONLY (OUTPATIENT)
Dept: HEMATOLOGY | Age: 60
End: 2020-03-23

## 2020-03-23 NOTE — PROGRESS NOTES
Cancelled appt due to COVID. Provider already informed the pt. Cancelled appt due to COVID. Provider already informed the pt.

## 2020-05-29 ENCOUNTER — HOSPITAL ENCOUNTER (EMERGENCY)
Age: 60
Discharge: HOME | End: 2020-05-30
Attending: EMERGENCY MEDICINE
Payer: MEDICAID

## 2020-05-29 DIAGNOSIS — R73.9 HYPERGLYCEMIA: Primary | ICD-10-CM

## 2020-05-29 DIAGNOSIS — M17.12 PRIMARY OSTEOARTHRITIS OF LEFT KNEE: ICD-10-CM

## 2020-05-29 LAB
GLUCOSE BLD STRIP.AUTO-MCNC: 279 MG/DL (ref 65–100)
SERVICE CMNT-IMP: ABNORMAL

## 2020-05-29 PROCEDURE — 99284 EMERGENCY DEPT VISIT MOD MDM: CPT

## 2020-05-29 PROCEDURE — 74011250636 HC RX REV CODE- 250/636: Performed by: EMERGENCY MEDICINE

## 2020-05-29 PROCEDURE — 96374 THER/PROPH/DIAG INJ IV PUSH: CPT

## 2020-05-29 PROCEDURE — 96361 HYDRATE IV INFUSION ADD-ON: CPT

## 2020-05-29 PROCEDURE — 82962 GLUCOSE BLOOD TEST: CPT

## 2020-05-29 RX ADMIN — SODIUM CHLORIDE 1000 ML: 900 INJECTION, SOLUTION INTRAVENOUS at 23:59

## 2020-05-30 ENCOUNTER — APPOINTMENT (OUTPATIENT)
Dept: GENERAL RADIOLOGY | Age: 60
End: 2020-05-30
Attending: EMERGENCY MEDICINE
Payer: MEDICAID

## 2020-05-30 VITALS
BODY MASS INDEX: 24.38 KG/M2 | HEIGHT: 72 IN | OXYGEN SATURATION: 95 % | TEMPERATURE: 98 F | DIASTOLIC BLOOD PRESSURE: 67 MMHG | SYSTOLIC BLOOD PRESSURE: 132 MMHG | RESPIRATION RATE: 16 BRPM | WEIGHT: 180 LBS | HEART RATE: 85 BPM

## 2020-05-30 LAB
ALBUMIN SERPL-MCNC: 3.4 G/DL (ref 3.5–5)
ALBUMIN/GLOB SERPL: 0.8 {RATIO} (ref 1.1–2.2)
ALP SERPL-CCNC: 62 U/L (ref 45–117)
ALT SERPL-CCNC: 75 U/L (ref 12–78)
ANION GAP SERPL CALC-SCNC: 10 MMOL/L (ref 5–15)
AST SERPL-CCNC: 69 U/L (ref 15–37)
BASOPHILS # BLD: 0 K/UL (ref 0–0.1)
BASOPHILS NFR BLD: 0 % (ref 0–1)
BILIRUB SERPL-MCNC: 0.2 MG/DL (ref 0.2–1)
BUN SERPL-MCNC: 10 MG/DL (ref 6–20)
BUN/CREAT SERPL: 10 (ref 12–20)
CALCIUM SERPL-MCNC: 8.4 MG/DL (ref 8.5–10.1)
CHLORIDE SERPL-SCNC: 104 MMOL/L (ref 97–108)
CO2 SERPL-SCNC: 26 MMOL/L (ref 21–32)
CREAT SERPL-MCNC: 0.97 MG/DL (ref 0.7–1.3)
DIFFERENTIAL METHOD BLD: ABNORMAL
EOSINOPHIL # BLD: 0 K/UL (ref 0–0.4)
EOSINOPHIL NFR BLD: 0 % (ref 0–7)
ERYTHROCYTE [DISTWIDTH] IN BLOOD BY AUTOMATED COUNT: 12.9 % (ref 11.5–14.5)
GLOBULIN SER CALC-MCNC: 4.2 G/DL (ref 2–4)
GLUCOSE BLD STRIP.AUTO-MCNC: 235 MG/DL (ref 65–100)
GLUCOSE SERPL-MCNC: 273 MG/DL (ref 65–100)
HCT VFR BLD AUTO: 41.7 % (ref 36.6–50.3)
HGB BLD-MCNC: 13.7 G/DL (ref 12.1–17)
IMM GRANULOCYTES # BLD AUTO: 0 K/UL (ref 0–0.04)
IMM GRANULOCYTES NFR BLD AUTO: 1 % (ref 0–0.5)
LYMPHOCYTES # BLD: 0.7 K/UL (ref 0.8–3.5)
LYMPHOCYTES NFR BLD: 16 % (ref 12–49)
MCH RBC QN AUTO: 27.8 PG (ref 26–34)
MCHC RBC AUTO-ENTMCNC: 32.9 G/DL (ref 30–36.5)
MCV RBC AUTO: 84.8 FL (ref 80–99)
MONOCYTES # BLD: 0.3 K/UL (ref 0–1)
MONOCYTES NFR BLD: 8 % (ref 5–13)
NEUTS SEG # BLD: 3.2 K/UL (ref 1.8–8)
NEUTS SEG NFR BLD: 75 % (ref 32–75)
NRBC # BLD: 0 K/UL (ref 0–0.01)
NRBC BLD-RTO: 0 PER 100 WBC
PLATELET # BLD AUTO: 180 K/UL (ref 150–400)
PMV BLD AUTO: 10.1 FL (ref 8.9–12.9)
POTASSIUM SERPL-SCNC: 3.5 MMOL/L (ref 3.5–5.1)
PROT SERPL-MCNC: 7.6 G/DL (ref 6.4–8.2)
RBC # BLD AUTO: 4.92 M/UL (ref 4.1–5.7)
RBC MORPH BLD: ABNORMAL
SERVICE CMNT-IMP: ABNORMAL
SODIUM SERPL-SCNC: 140 MMOL/L (ref 136–145)
WBC # BLD AUTO: 4.2 K/UL (ref 4.1–11.1)

## 2020-05-30 PROCEDURE — 85025 COMPLETE CBC W/AUTO DIFF WBC: CPT

## 2020-05-30 PROCEDURE — 80053 COMPREHEN METABOLIC PANEL: CPT

## 2020-05-30 PROCEDURE — 82962 GLUCOSE BLOOD TEST: CPT

## 2020-05-30 PROCEDURE — 74011250636 HC RX REV CODE- 250/636: Performed by: EMERGENCY MEDICINE

## 2020-05-30 PROCEDURE — 73562 X-RAY EXAM OF KNEE 3: CPT

## 2020-05-30 PROCEDURE — 36415 COLL VENOUS BLD VENIPUNCTURE: CPT

## 2020-05-30 RX ORDER — HYDROCODONE BITARTRATE AND ACETAMINOPHEN 5; 325 MG/1; MG/1
1 TABLET ORAL
Qty: 10 TAB | Refills: 0 | Status: SHIPPED | OUTPATIENT
Start: 2020-05-30 | End: 2020-06-01

## 2020-05-30 RX ORDER — IBUPROFEN 600 MG/1
600 TABLET ORAL
Qty: 30 TAB | Refills: 0 | Status: SHIPPED | OUTPATIENT
Start: 2020-05-30 | End: 2020-07-01 | Stop reason: ALTCHOICE

## 2020-05-30 RX ORDER — KETOROLAC TROMETHAMINE 30 MG/ML
30 INJECTION, SOLUTION INTRAMUSCULAR; INTRAVENOUS
Status: COMPLETED | OUTPATIENT
Start: 2020-05-30 | End: 2020-05-30

## 2020-05-30 RX ADMIN — KETOROLAC TROMETHAMINE 30 MG: 30 INJECTION, SOLUTION INTRAMUSCULAR; INTRAVENOUS at 01:02

## 2020-05-30 NOTE — ED PROVIDER NOTES
EMERGENCY DEPARTMENT HISTORY AND PHYSICAL EXAM      Date: 5/29/2020  Patient Name: Tea Watts    History of Presenting Illness     Chief Complaint   Patient presents with    Knee Pain    High Blood Sugar     History Provided By: Patient    HPI: Tea Watts, 62 y.o. male with past medical history significant for osteoarthritis and diabetes who presents via private vehicle to the ED with cc of left knee pain for the past week after jumping off of the tailgate of a friend's truck. Patient was doing some work with this gerald and was getting the coolers off of the truck when he landed awkwardly on his left leg. He denies any other trauma. His pain has progressively gotten worse over the past 4 days. He has tried using his knee brace with no relief of symptoms. He also endorses dry mouth for the past 2 days. He ran out of his diabetes medications but recently restarted them. He denies any polyuria or polydipsia. He does have a history of chronic kidney disease so he was told that he needs to avoid NSAIDs. PMHx: Osteoarthritis and diabetes  Social Hx: Smokes 1/2 pack/day, occasional alcohol use, denies illegal drug use    PCP: Shelby Camacho MD    There are no other complaints, changes, or physical findings at this time. No current facility-administered medications on file prior to encounter. Current Outpatient Medications on File Prior to Encounter   Medication Sig Dispense Refill    ergocalciferol (ERGOCALCIFEROL) 50,000 unit capsule Take 1 Cap by mouth every seven (7) days. 4 Cap 3    empagliflozin (JARDIANCE) 10 mg tablet Take 1 Tab by mouth daily. 30 Tab 5    baclofen (LIORESAL) 20 mg tablet Take 1 Tab by mouth two (2) times daily as needed for Pain. 30 Tab 0    acetaminophen (TYLENOL) 325 mg tablet Take 2 Tabs by mouth three (3) times daily as needed for Pain. 60 Tab 2    lisinopril (PRINIVIL, ZESTRIL) 2.5 mg tablet Take 1 Tab by mouth daily.  DC lisinopril 5 mg 30 Tab 5    [DISCONTINUED] ibuprofen (MOTRIN) 600 mg tablet Take 1 Tab by mouth every six (6) hours as needed for Pain. 10 Tab 0    [DISCONTINUED] diclofenac (VOLTAREN) 1 % gel Apply  to affected area two (2) times daily as needed for Pain. knee 100 g 2    metFORMIN (GLUCOPHAGE) 1,000 mg tablet Take 1 Tab by mouth two (2) times daily (with meals). 60 Tab 6    glimepiride (AMARYL) 2 mg tablet Take 1 Tab by mouth two (2) times a day. 60 Tab 6    folic acid (FOLVITE) 1 mg tablet Take 1 Tab by mouth daily. 30 Tab 0    multivitamin, tx-iron-ca-min (THERA-M W/ IRON) 9 mg iron-400 mcg tab tablet Take 1 Tab by mouth daily. 30 Tab 0    thiamine mononitrate (B-1) 100 mg tablet Take 1 Tab by mouth daily. 30 Tab 0    tadalafil (CIALIS) 5 mg tablet Take 1 Tab by mouth daily as needed (ED). 10 Tab 0    glucose blood VI test strips (BLOOD GLUCOSE TEST) strip Check BG 1-2 times/day 100 Strip 11    Blood-Glucose Meter (RELION ALL-IN-ONE METER) monitoring kit Check BG 1-2 times/day 1 Kit 0     Past History     Past Medical History:  Past Medical History:   Diagnosis Date    Arthritis     Diabetes [facility] [address]     Diabetes mellitus ([facility] [address] 2012    Eczema 2012     Past Surgical History:  Past Surgical History:   Procedure Laterality Date    COLONOSCOPY N/A 2020    COLONOSCOPY performed by [facility] at [facility] ENDOSCOPY    HX ORTHOPAEDIC      L knee scope     Family History:  Family History   Problem Relation Age of Onset    Hypertension Mother     Arthritis-osteo Mother     No Known Problems Father     Diabetes Sister      Social History:  Social History     Tobacco Use    Smoking status: Former Smoker     Packs/day: 0.25     Last attempt to quit: 2019     Years since quittin.7    Smokeless tobacco: Current User   Substance Use Topics    Alcohol use:  Yes     Alcohol/week: 8.3 standard drinks     Types: 5 Cans of beer, 5 Standard drinks or equivalent per week     Comment: 3-4 beers a day and 1 shot vodka 3x/week    Drug use: Not Currently     Types: Cocaine, Heroin     Comment: remote hx heroine and cocaine     Allergies: Allergies   Allergen Reactions    Doxycycline Itching    Tramadol Itching    Pcn [Penicillins] Hives     Review of Systems   Review of Systems   Constitutional: Negative for chills and fever. HENT: Negative for congestion, rhinorrhea, sneezing and sore throat. Dry mouth   Eyes: Negative for redness and visual disturbance. Respiratory: Negative for shortness of breath. Cardiovascular: Negative for chest pain and leg swelling. Gastrointestinal: Negative for abdominal pain, nausea and vomiting. Genitourinary: Negative for difficulty urinating and frequency. Musculoskeletal: Positive for arthralgias. Negative for back pain, myalgias and neck stiffness. Skin: Negative for rash. Neurological: Negative for dizziness, syncope, weakness and headaches. Hematological: Negative for adenopathy. All other systems reviewed and are negative. Physical Exam   Physical Exam  Vitals signs and nursing note reviewed. Constitutional:       Appearance: Normal appearance. He is well-developed. HENT:      Head: Normocephalic and atraumatic. Neck:      Musculoskeletal: Full passive range of motion without pain, normal range of motion and neck supple. Cardiovascular:      Rate and Rhythm: Normal rate and regular rhythm. Pulses: Normal pulses. Heart sounds: Normal heart sounds. No murmur. Pulmonary:      Effort: Pulmonary effort is normal. No respiratory distress. Breath sounds: Normal breath sounds. Chest:      Chest wall: No tenderness. Abdominal:      General: Bowel sounds are normal.      Palpations: Abdomen is soft. Tenderness: There is no abdominal tenderness. There is no guarding or rebound. Musculoskeletal:      Left knee: He exhibits decreased range of motion. He exhibits no swelling, no effusion, no deformity, normal meniscus and no MCL laxity. Tenderness found. Medial joint line and lateral joint line tenderness noted. Skin:     General: Skin is warm and dry. Findings: No erythema or rash. Neurological:      Mental Status: He is alert and oriented to person, place, and time. Psychiatric:         Speech: Speech normal.         Behavior: Behavior normal.         Thought Content: Thought content normal.         Judgment: Judgment normal.       Diagnostic Study Results   Labs -     Recent Results (from the past 12 hour(s))   GLUCOSE, POC    Collection Time: 05/29/20 11:41 PM   Result Value Ref Range    Glucose (POC) 279 (H) 65 - 100 mg/dL    Performed by Florencia Newman    CBC WITH AUTOMATED DIFF    Collection Time: 05/30/20 12:01 AM   Result Value Ref Range    WBC 4.2 4.1 - 11.1 K/uL    RBC 4.92 4.10 - 5.70 M/uL    HGB 13.7 12.1 - 17.0 g/dL    HCT 41.7 36.6 - 50.3 %    MCV 84.8 80.0 - 99.0 FL    MCH 27.8 26.0 - 34.0 PG    MCHC 32.9 30.0 - 36.5 g/dL    RDW 12.9 11.5 - 14.5 %    PLATELET 705 412 - 355 K/uL    MPV 10.1 8.9 - 12.9 FL    NRBC 0.0 0  WBC    ABSOLUTE NRBC 0.00 0.00 - 0.01 K/uL    NEUTROPHILS 75 32 - 75 %    LYMPHOCYTES 16 12 - 49 %    MONOCYTES 8 5 - 13 %    EOSINOPHILS 0 0 - 7 %    BASOPHILS 0 0 - 1 %    IMMATURE GRANULOCYTES 1 (H) 0.0 - 0.5 %    ABS. NEUTROPHILS 3.2 1.8 - 8.0 K/UL    ABS. LYMPHOCYTES 0.7 (L) 0.8 - 3.5 K/UL    ABS. MONOCYTES 0.3 0.0 - 1.0 K/UL    ABS. EOSINOPHILS 0.0 0.0 - 0.4 K/UL    ABS. BASOPHILS 0.0 0.0 - 0.1 K/UL    ABS. IMM.  GRANS. 0.0 0.00 - 0.04 K/UL    DF SMEAR SCANNED      RBC COMMENTS NORMOCYTIC, NORMOCHROMIC     METABOLIC PANEL, COMPREHENSIVE    Collection Time: 05/30/20 12:01 AM   Result Value Ref Range    Sodium 140 136 - 145 mmol/L    Potassium 3.5 3.5 - 5.1 mmol/L    Chloride 104 97 - 108 mmol/L    CO2 26 21 - 32 mmol/L    Anion gap 10 5 - 15 mmol/L    Glucose 273 (H) 65 - 100 mg/dL    BUN 10 6 - 20 MG/DL    Creatinine 0.97 0.70 - 1.30 MG/DL    BUN/Creatinine ratio 10 (L) 12 - 20      GFR est AA >60 >60 ml/min/1.73m2    GFR est non-AA >60 >60 ml/min/1.73m2    Calcium 8.4 (L) 8.5 - 10.1 MG/DL    Bilirubin, total 0.2 0.2 - 1.0 MG/DL    ALT (SGPT) 75 12 - 78 U/L    AST (SGOT) 69 (H) 15 - 37 U/L    Alk. phosphatase 62 45 - 117 U/L    Protein, total 7.6 6.4 - 8.2 g/dL    Albumin 3.4 (L) 3.5 - 5.0 g/dL    Globulin 4.2 (H) 2.0 - 4.0 g/dL    A-G Ratio 0.8 (L) 1.1 - 2.2     GLUCOSE, POC    Collection Time: 05/30/20  1:18 AM   Result Value Ref Range    Glucose (POC) 235 (H) 65 - 100 mg/dL    Performed by Sharona Monday        Radiologic Studies -   XR KNEE LT 3 V   Final Result   IMPRESSION:    1. Small joint effusion. No evidence of acute fracture. 2. Tricompartmental degenerative disease, severe in the patellofemoral   compartment and moderate in the medial compartment, which have progressed since   2015. Xr Knee Lt 3 V    Result Date: 5/30/2020  IMPRESSION: 1. Small joint effusion. No evidence of acute fracture. 2. Tricompartmental degenerative disease, severe in the patellofemoral compartment and moderate in the medial compartment, which have progressed since 2015. Medical Decision Making   I am the first provider for this patient. I reviewed the vital signs, available nursing notes, past medical history, past surgical history, family history and social history. Vital Signs-Reviewed the patient's vital signs. Patient Vitals for the past 24 hrs:   Temp Pulse Resp BP SpO2   05/30/20 0100 98 °F (36.7 °C) 85 16 132/67 95 %   05/29/20 2343  97   96 %   05/29/20 2328 98 °F (36.7 °C) (!) 102 16 158/84 100 %     Pulse Oximetry Analysis - 96% on RA    Records Reviewed: Nursing Notes, Old Medical Records and Previous Laboratory Studies    Provider Notes (Medical Decision Making):   52-year-old male presents with left knee pain for the past week as well as elevated blood glucose. Differential includes strain, osteoarthritis, low suspicion for fracture or dislocation.   Differential for his elevated blood glucose includes hyperglycemia versus DKA versus electrolyte abnormality. Will check basic labs, treat pain, and treat with 1 L of IV fluids and reassess. ED Course:   Initial assessment performed. The patients presenting problems have been discussed, and they are in agreement with the care plan formulated and outlined with them. I have encouraged them to ask questions as they arise throughout their visit. Progress Note  12:43 AM  I have re-evaluated pt and his labs are significant for a blood glucose of 273 but his creatinine is normal.  His knee x-ray shows tricompartmental osteoarthritis and degenerative joint disease. Will treat his pain with Toradol and recheck his blood glucose after his liter of IV fluids. Patient's blood sugar is 235 on repeat after his liter of IV fluids. Since he just restarted his medications, will discharge with outpatient follow-up. I have stressed the importance of continuing both of his diabetic medications and following a strict diet. Will discharge with a prescription for a few hydrocodone and a short course of NSAIDs given his normal renal function today. Progress Note:   Updated pt on all returned results and findings. Discussed the importance of proper follow up as referred below along with return precautions. Pt in agreement with the care plan and expresses agreement with and understanding of all items discussed. Disposition:  Discharge Note:  The pt is ready for discharge. The pt's signs, symptoms, diagnosis, and discharge instructions have been discussed and pt has conveyed their understanding. The pt is to follow up as recommended or return to ER should their symptoms worsen. Plan has been discussed and pt is in agreement. PLAN:  1.    Discharge Medication List as of 5/30/2020  1:14 AM      START taking these medications    Details   HYDROcodone-acetaminophen (Norco) 5-325 mg per tablet Take 1 Tab by mouth every six (6) hours as needed for Pain for up to 2 days. Max Daily Amount: 4 Tabs., Normal, Disp-10 Tab, R-0         CONTINUE these medications which have CHANGED    Details   ibuprofen (MOTRIN) 600 mg tablet Take 1 Tab by mouth every eight (8) hours as needed for Pain., Normal, Disp-30 Tab, R-0         CONTINUE these medications which have NOT CHANGED    Details   ergocalciferol (ERGOCALCIFEROL) 50,000 unit capsule Take 1 Cap by mouth every seven (7) days. , Normal, Disp-4 Cap, R-3      empagliflozin (JARDIANCE) 10 mg tablet Take 1 Tab by mouth daily. , Normal, Disp-30 Tab, R-5      baclofen (LIORESAL) 20 mg tablet Take 1 Tab by mouth two (2) times daily as needed for Pain., Normal, Disp-30 Tab, R-0      acetaminophen (TYLENOL) 325 mg tablet Take 2 Tabs by mouth three (3) times daily as needed for Pain., Normal, Disp-60 Tab, R-2      lisinopril (PRINIVIL, ZESTRIL) 2.5 mg tablet Take 1 Tab by mouth daily. DC lisinopril 5 mg, Normal, Disp-30 Tab, R-5      metFORMIN (GLUCOPHAGE) 1,000 mg tablet Take 1 Tab by mouth two (2) times daily (with meals). , Normal, Disp-60 Tab, R-6      glimepiride (AMARYL) 2 mg tablet Take 1 Tab by mouth two (2) times a day., Normal, Disp-60 Tab, R-6      folic acid (FOLVITE) 1 mg tablet Take 1 Tab by mouth daily. , OTC, Disp-30 Tab, R-0      multivitamin, tx-iron-ca-min (THERA-M W/ IRON) 9 mg iron-400 mcg tab tablet Take 1 Tab by mouth daily. , OTC, Disp-30 Tab, R-0      thiamine mononitrate (B-1) 100 mg tablet Take 1 Tab by mouth daily. , OTC, Disp-30 Tab, R-0      tadalafil (CIALIS) 5 mg tablet Take 1 Tab by mouth daily as needed (ED)., Normal, Disp-10 Tab, R-0      glucose blood VI test strips (BLOOD GLUCOSE TEST) strip Check BG 1-2 times/day, Normal, Disp-100 Strip, R-11      Blood-Glucose Meter (RELION ALL-IN-ONE METER) monitoring kit Check BG 1-2 times/day, Normal, Disp-1 Kit, R-0         STOP taking these medications       diclofenac (VOLTAREN) 1 % gel Comments:   Reason for Stoppin.   Follow-up Information     Follow up With Specialties Details Why Contact Info    Tom Olsen MD Internal Medicine Schedule an appointment as soon as possible for a visit  [address]  [facility]  [phone]      Meaghan Rosales DO Orthopedic Surgery Schedule an appointment as soon as possible for a visit  [address]  [place]  [address]  [phone]      [facility] EMERGENCY DEPT Emergency Medicine  As needed, If symptoms worsen Brett Velez  [phone]        Return to ED if worse     Diagnosis     Clinical Impression:   1. Hyperglycemia    2. Primary osteoarthritis of left knee            Please note that this dictation was completed with Dragon, computer voice recognition software. Quite often unanticipated grammatical, syntax, homophones, and other interpretive errors are inadvertently transcribed by the computer software. Please disregard these errors. Additionally, please excuse any errors that have escaped final proofreading.

## 2020-05-30 NOTE — ED TRIAGE NOTES
Pt presents to the ED with c/o left knee pain after jumping off a tailgate a week ago. Pt stated he landed wrong and then fell on his left side. Pt also stated he has concerns for high blood sugar. Pt stated he took his blood sugar and it was 275 PTA. Pt reported just getting his blood sugar medication filled yesterday (metformin). Pt reports having dry mouth and SOB that started today. Denies fevers, cough or being around anyone sick. Pt is very anxious and stated \"my sugar is normally low 100's. I read in retirement that that is stroke levels. This is not good. I can feel it in my eyes. \"     Pt is alert, oriented and appropriate. Ambulatory on arrival.       Emergency Department Nursing Plan of Care       The Nursing Plan of Care is developed from the Nursing assessment and Emergency Department Attending provider initial evaluation. The plan of care may be reviewed in the ED Provider note.     The Plan of Care was developed with the following considerations:   Patient / Family readiness to learn indicated by:verbalized understanding  Persons(s) to be included in education: patient  Barriers to Learning/Limitations:No    Signed     Maribel Knox    5/29/2020   11:32 PM

## 2020-05-30 NOTE — DISCHARGE INSTRUCTIONS
Patient Education        Arthritis: Care Instructions  Overview  Arthritis, also called osteoarthritis, is a breakdown of the cartilage that cushions your joints. When the cartilage wears down, your bones rub against each other. This causes pain and stiffness. Many people have some arthritis as they age. Arthritis most often affects the joints of the spine, hands, hips, knees, or feet. Arthritis never goes away completely. But medicine and home treatment can help reduce pain and help you stay active. Follow-up care is a key part of your treatment and safety. Be sure to make and go to all appointments, and call your doctor if you are having problems. It's also a good idea to know your test results and keep a list of the medicines you take. How can you care for yourself at home? · Stay at a healthy weight. Being overweight puts extra strain on your joints. · Talk to your doctor or physical therapist about exercises that will help ease joint pain. ? Stretch. You may enjoy gentle forms of yoga to help keep your joints and muscles flexible. ? Walk instead of jog. Other types of exercise that are less stressful on the joints include riding a bike, swimming, christina chi, or water exercise. ? Lift weights. Strong muscles help reduce stress on your joints. Stronger thigh muscles, for example, take some of the stress off of the knees and hips. Learn the right way to lift weights so you don't make joint pain worse. · Take your medicines exactly as prescribed. Call your doctor if you think you are having a problem with your medicine. · Take pain medicines exactly as directed. ? If the doctor gave you a prescription medicine for pain, take it as prescribed. ? If you are not taking a prescription pain medicine, ask your doctor if you can take an over-the-counter medicine. · Use a cane, crutch, walker, or another device if you need help to get around. These can help rest your joints.  You also can use other things to make life easier, such as a higher toilet seat and padded handles on kitchen utensils. · Do not sit in low chairs. They can make it hard to get up. · Put heat or cold on your sore joints as needed. Use whichever helps you most. You can also switch between hot and cold packs. ? Apply heat 2 or 3 times a day for 20 to 30 minutes--using a heating pad, hot shower, or hot pack--to relieve pain and stiffness. But don't use heat on a swollen joint. ? Put ice or a cold pack on your sore joint for 10 to 20 minutes at a time. Put a thin cloth between the ice and your skin. When should you call for help? Call your doctor now or seek immediate medical care if:  · You have sudden swelling, warmth, or pain in any joint. · You have joint pain and a fever or rash. · You have such bad pain that you cannot use a joint. Watch closely for changes in your health, and be sure to contact your doctor if:  · You have mild joint symptoms that continue even with more than 6 weeks of care at home. · You have stomach pain or other problems with your medicine. Where can you learn more? Go to http://ajitNotedavid.info/  Enter W715 in the search box to learn more about \"Arthritis: Care Instructions. \"  Current as of: December 9, 2019               Content Version: 12.5  © 2039-3449 [employer]. Care instructions adapted under license by [employer] (which disclaims liability or warranty for this information). If you have questions about a medical condition or this instruction, always ask your healthcare professional. [place] [address] any warranty or liability for your use of this information. Patient Education        Knee Arthritis: Care Instructions  Your Care Instructions     Knee arthritis is a breakdown of the cartilage that cushions your knee joint. When the cartilage wears down, your bones rub against each other. This causes pain and stiffness.  Knee arthritis tends to get worse with time. Treatment for knee arthritis involves reducing pain, making the leg muscles stronger, and staying at a healthy body weight. The treatment usually does not improve the health of the cartilage, but it can reduce pain and improve how well your knee works. You can take simple measures to protect your knee joints, ease your pain, and help you stay active. Follow-up care is a key part of your treatment and safety. Be sure to make and go to all appointments, and call your doctor if you are having problems. It's also a good idea to know your test results and keep a list of the medicines you take. How can you care for yourself at home? · Know that knee arthritis will cause more pain on some days than on others. · Stay at a healthy weight. Lose weight if you are overweight. When you stand up, the pressure on your knees from every pound of body weight is multiplied four times. So if you lose 10 pounds, you will reduce the pressure on your knees by 40 pounds. · Talk to your doctor or physical therapist about exercises that will help ease joint pain. ? Stretch to help prevent stiffness and to prevent injury before you exercise. You may enjoy gentle forms of yoga to help keep your knee joints and muscles flexible. ? Walk instead of jog.  ? Ride a bike. This makes your thigh muscles stronger and takes pressure off your knee. ? Wear well-fitting and comfortable shoes. ? Exercise in chest-deep water. This can help you exercise longer with less pain. ? Avoid exercises that include squatting or kneeling. They can put a lot of strain on your knees. ? Talk to your doctor to make sure that the exercise you do is not making the arthritis worse. · Do not sit for long periods of time. Try to walk once in a while to keep your knee from getting stiff. · Ask your doctor or physical therapist whether shoe inserts may reduce your arthritis pain.   · If you can afford it, get new athletic shoes at least every year. This can help reduce the strain on your knees. · Use a device to help you do everyday activities. ? A cane or walking stick can help you keep your balance when you walk. Hold the cane or walking stick in the hand opposite the painful knee. ? If you feel like you may fall when you walk, try using crutches or a front-wheeled walker. These can prevent falls that could cause more damage to your knee. ? A knee brace may help keep your knee stable and prevent pain. ? You also can use other things to make life easier, such as a higher toilet seat and handrails in the bathtub or shower. · Take pain medicines exactly as directed. ? Do not wait until you are in severe pain. You will get better results if you take it sooner. ? If you are not taking a prescription pain medicine, take an over-the-counter medicine such as acetaminophen (Tylenol), ibuprofen (Advil, Motrin), or naproxen (Aleve). Read and follow all instructions on the label. ? Do not take two or more pain medicines at the same time unless the doctor told you to. Many pain medicines have acetaminophen, which is Tylenol. Too much acetaminophen (Tylenol) can be harmful. ? Tell your doctor if you take a blood thinner, have diabetes, or have allergies to shellfish. · Ask your doctor if you might benefit from a shot of steroid medicine into your knee. This may provide pain relief for several months. · Many people take the supplements glucosamine and chondroitin for osteoarthritis. Some people feel they help, but the medical research does not show that they work. Talk to your doctor before you take these supplements. When should you call for help? Call your doctor now or seek immediate medical care if:  · You have sudden swelling, warmth, or pain in your knee. · You have knee pain and a fever or rash. · You have such bad pain that you cannot use your knee.   Watch closely for changes in your health, and be sure to contact your doctor if you have any problems. Where can you learn more? Go to http://ajit-david.info/  Enter W187 in the search box to learn more about \"Knee Arthritis: Care Instructions. \"  Current as of: December 9, 2019               Content Version: 12.5  © 1036-1369 [employer]. Care instructions adapted under license by [employer] (which disclaims liability or warranty for this information). If you have questions about a medical condition or this instruction, always ask your healthcare professional. [place] [address] any warranty or liability for your use of this information.

## 2020-06-01 ENCOUNTER — PATIENT OUTREACH (OUTPATIENT)
Dept: INTERNAL MEDICINE CLINIC | Age: 60
End: 2020-06-01

## 2020-06-01 ENCOUNTER — TELEPHONE (OUTPATIENT)
Dept: ORTHOPEDIC SURGERY | Age: 60
End: 2020-06-01

## 2020-06-01 NOTE — TELEPHONE ENCOUNTER
Called pt to see if he would like to be scheduled for a virtual visit with Dr. David Marin for his LT knee that he was seen in the ED for on 5/29/20. Pt stated he would call back in the next few days so he can check his schedule.

## 2020-06-01 NOTE — PROGRESS NOTES
TOCED 2020:  Hyperglycemia/OA Knee  Patient contacted regarding recent discharge and COVID-19 risk. Discussed COVID-19 related testing which was not done at this time. Test results were not done. Patient informed of results, if available? no    Care Transition Nurse/ Ambulatory Care Manager contacted the patient by telephone to perform post discharge assessment. Verified name and  with patient as identifiers. Patient has following risk factors of: diabetes. ED   CTN/ACM reviewed discharge instructions, medical action plan and red flags related to discharge diagnosis. Reviewed and educated them on any new and changed medications related to discharge diagnosis. Advised obtaining a 90-day supply of all daily and as-needed medications. Education provided regarding infection prevention, and signs and symptoms of COVID-19 and when to seek medical attention with patient who verbalized understanding. Discussed exposure protocols and quarantine from [address] you at higher risk for severe illness  and given an opportunity for questions and concerns. The patient agrees to contact the COVID-19 hotline [phone] or PCP office for questions related to their healthcare. CTN/ACM provided contact information for future reference. From CDC: Are you at higher risk for severe illness?  Wash your hands often.  Avoid close contact (6 feet, which is about two arm lengths) with people who are sick.  Put distance between yourself and other people if COVID-19 is spreading in your community.  Clean and disinfect frequently touched surfaces.  Avoid all cruise travel and non-essential air travel.  Call your healthcare professional if you have concerns about COVID-19 and your underlying condition or if you are sick.     For more information on steps you can take to protect yourself, see CDC's How to Protect Yourself      Patient/family/caregiver given information for [facility] and agrees to enroll no    Plan for follow-up call in 7-14 days based on severity of symptoms and risk factors.

## 2020-07-01 ENCOUNTER — VIRTUAL VISIT (OUTPATIENT)
Dept: INTERNAL MEDICINE CLINIC | Age: 60
End: 2020-07-01

## 2020-07-01 DIAGNOSIS — Z12.5 SCREENING FOR MALIGNANT NEOPLASM OF PROSTATE: ICD-10-CM

## 2020-07-01 DIAGNOSIS — I10 ESSENTIAL HYPERTENSION: ICD-10-CM

## 2020-07-01 DIAGNOSIS — N52.9 ERECTILE DYSFUNCTION, UNSPECIFIED ERECTILE DYSFUNCTION TYPE: ICD-10-CM

## 2020-07-01 DIAGNOSIS — M17.10 ARTHRITIS OF KNEE: ICD-10-CM

## 2020-07-01 DIAGNOSIS — E11.9 TYPE 2 DIABETES MELLITUS WITHOUT COMPLICATION, WITHOUT LONG-TERM CURRENT USE OF INSULIN (HCC): Primary | ICD-10-CM

## 2020-07-01 RX ORDER — TRAMADOL HYDROCHLORIDE 50 MG/1
50 TABLET ORAL
Qty: 20 TAB | Refills: 0 | Status: SHIPPED | OUTPATIENT
Start: 2020-07-01 | End: 2020-07-04

## 2020-07-01 RX ORDER — GLIMEPIRIDE 2 MG/1
2 TABLET ORAL 2 TIMES DAILY
Qty: 60 TAB | Refills: 6 | Status: SHIPPED | OUTPATIENT
Start: 2020-07-01 | End: 2021-03-08 | Stop reason: SDUPTHER

## 2020-07-01 RX ORDER — LISINOPRIL 2.5 MG/1
2.5 TABLET ORAL DAILY
Qty: 30 TAB | Refills: 5 | Status: SHIPPED | OUTPATIENT
Start: 2020-07-01 | End: 2021-02-17

## 2020-07-01 RX ORDER — METFORMIN HYDROCHLORIDE 1000 MG/1
1000 TABLET ORAL 2 TIMES DAILY WITH MEALS
Qty: 60 TAB | Refills: 6 | Status: SHIPPED | OUTPATIENT
Start: 2020-07-01 | End: 2021-03-08 | Stop reason: SDUPTHER

## 2020-07-01 NOTE — PROGRESS NOTES
Alisha Malloy is a 62 y.o. male who was seen by synchronous (real-time) audio-video technology on 7/1/2020 for Diabetes; Elevated PSA (wants labs and to have his prostate checked); ED Follow-up (5/29/2020 ); and Knee Pain (and arthritis)        Assessment & Plan:   Diagnoses and all orders for this visit:    1. Type 2 diabetes mellitus without complication, without long-term current use of insulin ([facility])    Fasting blood sugar is 172 ,190. He is on metformin and glimepiride twice a day. Never started the Jardiance. Probably insurance is not covering it. Will discontinue Jardiance. Will add,  -     SITagliptin (JANUVIA) 50 mg tablet; Take 1 Tab by mouth daily. DC jardiance  -     REFERRAL TO OPHTHALMOLOGY  -     lisinopriL (PRINIVIL, ZESTRIL) 2.5 mg tablet; Take 1 Tab by mouth daily. DC lisinopril 5 mg  -     glimepiride (AMARYL) 2 mg tablet; Take 1 Tab by mouth two (2) times a day. -     metFORMIN (GLUCOPHAGE) 1,000 mg tablet; Take 1 Tab by mouth two (2) times daily (with meals). -     LIPID PANEL  -     METABOLIC PANEL, COMPREHENSIVE  -     HEMOGLOBIN A1C WITH EAG  -     MICROALBUMIN, UR, RAND W/ MICROALB/CREAT RATIO    2. Essential hypertension    Stable. Will refill,  -     lisinopriL (PRINIVIL, ZESTRIL) 2.5 mg tablet; Take 1 Tab by mouth daily. DC lisinopril 5 mg  -     LIPID PANEL    3. Arthritis of knee    Has advanced DJD on the left side. Also have some arthritis in the right knee. He is taking Tylenol every day and using diclofenac gel. Not helping much. Advised him to see orthopedic to schedule for surgery. Will give,  -     traMADoL (ULTRAM) 50 mg tablet; Take 1 Tab by mouth two (2) times daily as needed for Pain for up to 3 days. Max Daily Amount: 100 mg. #20, no refill. Patient is able to take tramadol.  -     REFERRAL TO ORTHOPEDICS    4. Screening for malignant neoplasm of prostate    Will order,  -     PSA, DIAGNOSTIC (PROSTATE SPECIFIC AG)    5.  Erectile dysfunction, unspecified erectile dysfunction type    We will check,  -     TESTOSTERONE, FREE & TOTAL        I spent at least 25 minutes on this visit with this established patient. Subjective:   Mr. Dolores Villanueva has not been seen in over 6-month. Report bilateral knee pain, more pain on the left knee, he is not able to walk with arthritis in the knee. He has been taking Tylenol every day elevate using diclofenac gel. Knee pain is not better. Report ureteral dysfunction also not on medicine right now. He is diabetic, fasting blood sugar is 172 190. He is on metformin and glimepiride. He has never taken Jardiance. Eye checkup not up-to-date. Has hypertension, compliant with medicine. And need medicine refill. Had colonoscopy done, has tubulovillous polyp. He is advised to get follow-up colonoscopy in 2 years. Prior to Admission medications    Medication Sig Start Date End Date Taking? Authorizing Provider   SITagliptin (JANUVIA) 50 mg tablet Take 1 Tab by mouth daily. BHARAT jardiance 7/1/20  Yes Renetta Hopson MD   lisinopriL (PRINIVIL, ZESTRIL) 2.5 mg tablet Take 1 Tab by mouth daily. DC lisinopril 5 mg 7/1/20  Yes Renetta Hopson MD   glimepiride (AMARYL) 2 mg tablet Take 1 Tab by mouth two (2) times a day. 7/1/20  Yes Renetta Hopson MD   metFORMIN (GLUCOPHAGE) 1,000 mg tablet Take 1 Tab by mouth two (2) times daily (with meals). 7/1/20  Yes Renetta Hopson MD   traMADoL Aubrie Neas) 50 mg tablet Take 1 Tab by mouth two (2) times daily as needed for Pain for up to 3 days. Max Daily Amount: 100 mg. 7/1/20 7/4/20 Yes Renetta Hopson MD   acetaminophen (TYLENOL) 325 mg tablet Take 2 Tabs by mouth three (3) times daily as needed for Pain.  11/6/19  Yes Renetta Hopson MD   glucose blood VI test strips (BLOOD GLUCOSE TEST) strip Check BG 1-2 times/day 7/6/17  Yes Evelyn Mariano MD   Blood-Glucose Meter (RELION ALL-IN-ONE METER) monitoring kit Check BG 1-2 times/day 11/30/16  Yes Evelyn Mariano MD   ibuprofen (MOTRIN) 600 mg tablet Take 1 Tab by mouth every eight (8) hours as needed for Pain. 5/30/20 7/1/20  Linda Reese MD   ergocalciferol (ERGOCALCIFEROL) 50,000 unit capsule Take 1 Cap by mouth every seven (7) days. 11/13/19 7/1/20  Candance Maris, MD   empagliflozin (JARDIANCE) 10 mg tablet Take 1 Tab by mouth daily. 11/13/19 7/1/20  Candance Maris, MD   baclofen (LIORESAL) 20 mg tablet Take 1 Tab by mouth two (2) times daily as needed for Pain. 11/6/19   Candance Maris, MD   lisinopril (PRINIVIL, ZESTRIL) 2.5 mg tablet Take 1 Tab by mouth daily. DC lisinopril 5 mg 11/6/19 7/1/20  Candance Maris, MD   metFORMIN (GLUCOPHAGE) 1,000 mg tablet Take 1 Tab by mouth two (2) times daily (with meals). 9/18/19 7/1/20  Emiliana Quintero MD   glimepiride (AMARYL) 2 mg tablet Take 1 Tab by mouth two (2) times a day. 9/18/19 7/1/20  Emiliana Quintero MD   folic acid (FOLVITE) 1 mg tablet Take 1 Tab by mouth daily. 9/7/19   July Burnham NP   multivitamin, tx-iron-ca-min (THERA-M W/ IRON) 9 mg iron-400 mcg tab tablet Take 1 Tab by mouth daily. 9/7/19   July Burnham NP   thiamine mononitrate (B-1) 100 mg tablet Take 1 Tab by mouth daily. 9/7/19   July Burnham NP   tadalafil (CIALIS) 5 mg tablet Take 1 Tab by mouth daily as needed (ED). 5/9/19   Candance Maris, MD     Past Medical History:   Diagnosis Date    Arthritis     Diabetes ([address]     Diabetes mellitus ([facility] [facility] 7/11/2012    Eczema 7/11/2012       ROS significant for knee pain.     Objective:     Patient-Reported Vitals 7/1/2020   Patient-Reported Weight 180lb   Patient-Reported Height 6f          Constitutional: [x] Appears well-developed and well-nourished [x] No apparent distress      [] Abnormal -     Mental status: [x] Alert and awake  [x] Oriented to person/place/time [x] Able to follow commands    [] Abnormal -      HENT: [x] Normocephalic, atraumatic  [] Abnormal -   [x] Mouth/Throat: Mucous membranes are moist    External Ears [x] Normal  [] Abnormal -    Neck: [x] No visualized mass [] Abnormal - Pulmonary/Chest: [x] Respiratory effort normal   [x] No visualized signs of difficulty breathing or respiratory distress        [] Abnormal -      Musculoskeletal: Bilateral knee: Pain and tenderness present. Range of motion restricted. -     Neurological:        [x] No Facial Asymmetry (Cranial nerve 7 motor function) (limited exam due to video visit)          [x] No gaze palsy        [] Abnormal -          Skin:        [x] No significant exanthematous lesions or discoloration noted on facial skin         [] Abnormal -            Psychiatric:       [x] Normal Affect [] Abnormal -        [x] No Hallucinations    Other pertinent observable physical exam findings:-        We discussed the expected course, resolution and complications of the diagnosis(es) in detail. Medication risks, benefits, costs, interactions, and alternatives were discussed as indicated. I advised him to contact the office if his condition worsens, changes or fails to improve as anticipated. He expressed understanding with the diagnosis(es) and plan. Shanika Garcias, who was evaluated through a patient-initiated, synchronous (real-time) audio-video encounter, and/or his healthcare decision maker, is aware that it is a billable service, with coverage as determined by his insurance carrier. He provided verbal consent to proceed: Yes, and patient identification was verified. It was conducted pursuant to the emergency declaration under the [address] [place] [address] and the [employer] and [employer] General Act. A caregiver was present when appropriate. Ability to conduct physical exam was limited. I was in the office. The patient was at home.       Annita Alejandro MD

## 2020-07-01 NOTE — PROGRESS NOTES
Health Maintenance Due   Topic Date Due    Eye Exam Retinal or Dilated  08/11/1968    DTaP/Tdap/Td series (1 - Tdap) 08/11/1979    Shingrix Vaccine Age 50> (1 of 2) 08/11/2008    FOBT Q1Y Age 50-75  08/11/2008    Pneumococcal 0-64 years (1 of 1 - PPSV23) 11/06/2019    Lipid Screen  03/11/2020    Foot Exam Q1  05/09/2020       No chief complaint on file. 1. Have you been to the ER, urgent care clinic since your last visit? Hospitalized since your last visit? 5/29/2020 [facility] [place], [place]    2. Have you seen or consulted any other health care providers outside of the [address] [place] [address] since your last visit? Include any pap smears or colon screening. No    3) Do you have an Advance Directive on file? no    4) Are you interested in receiving information on Advance Directives? NO      Patient is accompanied by self I have received verbal consent from Melany Hernández to discuss any/all medical information while they are present in the room.

## 2020-07-16 ENCOUNTER — OFFICE VISIT (OUTPATIENT)
Dept: HEMATOLOGY | Age: 60
End: 2020-07-16

## 2020-07-16 VITALS
BODY MASS INDEX: 23.84 KG/M2 | SYSTOLIC BLOOD PRESSURE: 133 MMHG | OXYGEN SATURATION: 100 % | HEART RATE: 78 BPM | TEMPERATURE: 97.6 F | HEIGHT: 72 IN | RESPIRATION RATE: 16 BRPM | DIASTOLIC BLOOD PRESSURE: 78 MMHG | WEIGHT: 176 LBS

## 2020-07-16 DIAGNOSIS — B18.2 CHRONIC HEPATITIS C WITHOUT HEPATIC COMA (HCC): Primary | ICD-10-CM

## 2020-07-16 RX ORDER — GLECAPREVIR AND PIBRENTASVIR 40; 100 MG/1; MG/1
3 TABLET, FILM COATED ORAL DAILY
Qty: 84 TAB | Refills: 1 | Status: SHIPPED | OUTPATIENT
Start: 2020-07-16 | End: 2020-10-12 | Stop reason: ALTCHOICE

## 2020-07-16 NOTE — LETTER
7/16/20 Patient: Aime Coleman YOB: 1960 Date of Visit: 7/16/2020 Caitlin Leon MD 
[address] [id number] VIA In Basket Dear Caitlin Leon MD, Thank you for referring Mr. Jenny Greco to [address] for evaluation. My notes for this consultation are attached. If you have questions, please do not hesitate to call me. I look forward to following your patient along with you. Sincerely, Viviana Gomez, NP

## 2020-07-16 NOTE — PROGRESS NOTES
1201 Lila Fitzpatrick MD, Moisés Vallejo, MD Cayla Hernandez PA-C [facility]-MIKE Anderson, [facility]   SERGIO Osorio-DION Platt, [facility]       Jessica Goldsmith [facility] [address]    at [address], [address] [place] [address], [facility] 22.    [phone]    FAX: [address] [place] [address]    at [address], [address]    [phone]    FAX: [phone]     Patient Care Team:  Madie Inman MD as PCP - General (Internal Medicine)  Madie Inman MD as PCP - [facility]  John Wise MD as Consulting Provider (Endocrinology)    Patient Active Problem List   Diagnosis Code    Eczema L30.9    Erectile dysfunction N52.9    Diabetes mellitus ([facility] [facility] E11.9    Type 2 diabetes with nephropathy ([facility] E11.21    Dehydration E86.0    Diabetes ([facility] [address] [facility] E11.9    ASHLEY (acute kidney injury) ([facility] [facility] N17.9    Tobacco abuse Z72.0    ETOH abuse F10.10     Alisha Malloy is being seen at [address] for management of chronic HCV. The active problem list, all pertinent past medical history, medications, liver histology,   radiologic findings and laboratory findings related to the liver disorder were reviewed with the patient. The patient is a 62 y.o. Black male who was found to have abnormalities in liver chemistries and subsequently tested positive for chronic HCV in 2019. Imaging of the liver was either not performed or the results are not available to me. An assessment of liver fibrosis with HCV FibroSure suggests F1. The patient has not received treatment for HCV. The patient has no symptoms which can be attributed to the liver disorder.     The patient has not experienced the following symptoms: arthralgias. The patient has mild limitations in functional activities which can be attributed to other medical problems not related to the liver disease. ASSESSMENT AND PLAN:  Chronic HCV   Chronic HCV with F1 fibrosis. Liver transaminases are normal. ALP is normal. Liver function is normal. Total bilirubin is normal. Serum albumin is normal. The platelet count is normal.     Based upon laboratory studies, the patient does not appear to have advanced liver disease. Will perform laboratory testing to monitor liver function and degree of liver injury. This included BMP, hepatic panel and CBC with platelet count. Will perform and/or review results of HCV viral load, HCV genotype to define the specific treatment and duration of treatment that will be required. Will perform imaging of the liver with ultrasound. Chronic HCV treatment  The patient has not been treated for HCV. The patient has HCV genotype that is not yet defined. Discussed the treatment alternatives. The SVR/cure rate for HCV now exceeds 97% without significant side effects for most patients with HCV. The specific treatment is dependent upon genotype, viral load and histology. The patient should be treated with [facility], as it is preferred by Medicaid. He signed the treatment agreement in the office with me today. The SVR/cure rate for is over 95%. Screening for hepatocellular carcinoma  HCC screening is not necessary if the patient has no evidence of cirrhosis. Treatment of other medical problems in patients with chronic liver disease  There are no contraindications for the patient to take most medications necessary for treatment of other medical issues. The patient can take any medications utilized for treatment of DM and/or statins to treat hypercholesterolemia. The patient does not consume alcohol on a daily basis.  Normal doses of acetaminophen, as recommended on the label of the bottle, are not hepatotoxic except in the setting of daily alcohol use. Even patients with cirrhosis can utilize acetaminophen for pain. Counseling for alcohol in patients with chronic liver disease  The patient was counseled regarding alcohol consumption and the effect of alcohol on chronic liver disease. The patient was reminded alcohol can cause fatty liver. Vaccinations   Recommend vaccination against viral hepatitis B. He has documented immunity against viral hepatitis A. Routine vaccinations against other bacterial and viral agents can be performed as indicated. Annual flu vaccination should be administered if indicated. ALLERGIES  No Known Allergies    Current Outpatient Medications on File Prior to Visit   Medication Sig Dispense Refill    SITagliptin (JANUVIA) 50 mg tablet Take 1 Tab by mouth daily. DC jardiance 30 Tab 5    lisinopriL (PRINIVIL, ZESTRIL) 2.5 mg tablet Take 1 Tab by mouth daily. DC lisinopril 5 mg 30 Tab 5    glimepiride (AMARYL) 2 mg tablet Take 1 Tab by mouth two (2) times a day. 60 Tab 6    metFORMIN (GLUCOPHAGE) 1,000 mg tablet Take 1 Tab by mouth two (2) times daily (with meals). 60 Tab 6    baclofen (LIORESAL) 20 mg tablet Take 1 Tab by mouth two (2) times daily as needed for Pain. 30 Tab 0    acetaminophen (TYLENOL) 325 mg tablet Take 2 Tabs by mouth three (3) times daily as needed for Pain. 60 Tab 2    folic acid (FOLVITE) 1 mg tablet Take 1 Tab by mouth daily. 30 Tab 0    multivitamin, tx-iron-ca-min (THERA-M W/ IRON) 9 mg iron-400 mcg tab tablet Take 1 Tab by mouth daily. 30 Tab 0    thiamine mononitrate (B-1) 100 mg tablet Take 1 Tab by mouth daily. 30 Tab 0    tadalafil (CIALIS) 5 mg tablet Take 1 Tab by mouth daily as needed (ED).  10 Tab 0    glucose blood VI test strips (BLOOD GLUCOSE TEST) strip Check BG 1-2 times/day 100 Strip 11    Blood-Glucose Meter (RELION ALL-IN-ONE METER) monitoring kit Check BG 1-2 times/day 1 Kit 0     No current facility-administered medications on file prior to visit. SYSTEM REVIEW NOT RELATED TO LIVER DISEASE OR REVIEWED ABOVE:  Constitution systems: Negative for fever, chills, weight gain, weight loss. Eyes: Negative for visual changes. ENT: Negative for sore throat, painful swallowing. Respiratory: Negative for cough, hemoptysis, SOB. Cardiology: Negative for chest pain, palpitations. GI:  Negative for constipation or diarrhea. : Negative for urinary frequency, dysuria, hematuria, nocturia. Skin: Negative for rash. Hematology: Negative for easy bruising, blood clots. Musculo-skeletal: Negative for back pain, muscle pain, weakness. Neurologic: Negative for headaches, dizziness, vertigo, memory problems not related to HE. Psychology: Negative for anxiety, depression. FAMILY HISTORY:  The father  from unknown cause while the patient was in skilled nursing. The mother is alive with hypertension and dementia. There is no family history of liver disease. His brother  from [facility]. There is no family history of immune disorders. SOCIAL HISTORY:  The patient has a girlfriend. The patient has 5 children and lots of grandchildren. The patient smokes 1/2 PPD. The patient drinks 2-3 beers about 3 times a week. The patient collects SSI due to arthritis. PHYSICAL EXAMINATION:  Visit Vitals  /78   Pulse 78   Temp 97.6 °F (36.4 °C) (Temporal)   Resp 16   Ht 6' (1.829 m)   Wt 176 lb (79.8 kg)   SpO2 100%   BMI 23.87 kg/m²     General: No acute distress. Eyes: Sclera anicteric. ENT: No oral lesions. Nodes: No adenopathy. Skin: No spider angiomata. No jaundice. No palmar erythema. Respiratory: Lungs clear to auscultation. Cardiovascular: Regular heart rate. No murmurs. No JVD. Abdomen: Soft non-tender. Liver size normal to percussion/palpation. Spleen not palpable. No obvious ascites. Extremities: No edema. No muscle wasting. No gross arthritic changes.   Neurologic: Alert and oriented. Cranial nerves grossly intact. No asterixis.     LABORATORY STUDIES:  [facility] [place] of [address] Units 5/30/2020 2/27/2020   WBC 4.1 - 11.1 K/uL 4.2    ANC 1.8 - 8.0 K/UL 3.2    HGB 12.1 - 17.0 g/dL 13.7     - 400 K/uL 180    INR 0.8 - 1.2     AST 15 - 37 U/L 69 (H)    ALT 12 - 78 U/L 75 62 (H)   Alk Phos 45 - 117 U/L 62    Bili, Total 0.2 - 1.0 MG/DL 0.2 0.2   Bili, Direct 0.00 - 0.40 mg/dL     Albumin 3.5 - 5.0 g/dL 3.4 (L)    BUN 6 - 20 MG/DL 10    BUN (iSTAT) 9 - 20 mg/dL     Creat 0.70 - 1.30 MG/DL 0.97    Creat (iSTAT) 0.6 - 1.3 mg/dL     Na 136 - 145 mmol/L 140    K 3.5 - 5.1 mmol/L 3.5    Cl 97 - 108 mmol/L 104    CO2 21 - 32 mmol/L 26    Glucose 65 - 100 mg/dL 273 (H)    Magnesium 1.6 - 2.4 mg/dL       [facility] [place] of [address] [place] [address] Ref Rng & Units 2/27/2020 11/6/2019   WBC 4.1 - 11.1 K/uL 4.7 5.4   ANC 1.8 - 8.0 K/UL  3.9   HGB 12.1 - 17.0 g/dL 15.3 14.4    - 400 K/uL 129 (L) 193   INR 0.8 - 1.2 1.0    AST 15 - 37 U/L 37 25   ALT 12 - 78 U/L 47 (H) 26   Alk Phos 45 - 117 U/L 68 56   Bili, Total 0.2 - 1.0 MG/DL 0.4 0.5   Bili, Direct 0.00 - 0.40 mg/dL 0.13    Albumin 3.5 - 5.0 g/dL 4.2 4.2   BUN 6 - 20 MG/DL 14 16   BUN (iSTAT) 9 - 20 mg/dL     Creat 0.70 - 1.30 MG/DL 0.80 0.91   Creat (iSTAT) 0.6 - 1.3 mg/dL     Na 136 - 145 mmol/L 141 139   K 3.5 - 5.1 mmol/L 4.4 4.3   Cl 97 - 108 mmol/L 102 100   CO2 21 - 32 mmol/L 24 26   Glucose 65 - 100 mg/dL 116 (H) 139 (H)   Magnesium 1.6 - 2.4 mg/dL       From 11/2019  AST/ALT/ALP/T Bili/ALB: 26/33/66/0.3/4.5  BUN/CREAT: 14/0.60    SEROLOGIES:  Virology Latest Ref Rng & Units 2/27/2020   HCV RNA (IU) IU/mL 14,000,000   HCV RNA, DAISY, QL Negative Positive (A)     Serologies Latest Ref Rng & Units 2/27/2020 9/6/2019   Hep A Ab, Total Negative Positive (A)    Hep B Surface Ag Negative Negative    Hep B Surface Ag Index     Hep B Surface Ag Interp NEG       Hep B Core Ab, Total Negative Negative    Hep B Surface AB QL  Non Reactive    Hep C Ab NR       Hep C Genotype  1a    HCV RT-PCR, Quant IU/mL See Final Results    C-ANCA Neg:<1:20 titer  <1:20   P-ANCA Neg:<1:20 titer  <1:20   ANCA Neg:<1:20 titer  <1:20     Serologies Latest Ref Rng & Units 9/5/2019   Hep A Ab, Total Negative    Hep B Surface Ag Negative    Hep B Surface Ag Index <0.10   Hep B Surface Ag Interp NEG   NEGATIVE   Hep B Core Ab, Total Negative    Hep B Surface AB QL     Hep C Ab NR   REACTIVE (A)   Hep C Genotype     HCV RT-PCR, Quant IU/mL    C-ANCA Neg:<1:20 titer    P-ANCA Neg:<1:20 titer    ANCA Neg:<1:20 titer      11/2019. HCV RNA qual positive. LIVER HISTOLOGY:  2/2020. FibroSure. F1    ENDOSCOPIC PROCEDURES:  Not available or performed    RADIOLOGY:  3/2020. Abdominal ultrasound. Mildly echogenic and heterogeneous liver can be seen with hepatic steatosis or nonspecific parenchymal liver disease. No focal liver mass. OTHER TESTING:  Not available or performed    FOLLOW-UP:  All of the issues listed above in the assessment and plan were discussed with the patient. All questions were answered. The patient expressed a clear understanding of the above. [facility] 87 4 weeks after initiation of medical therapy. The patient was advised to call the office when he receives his medication to provide us with his start date and to schedule his 4 week treatment follow up appointment. Recommend the follow up be scheduled virtually and advised the patient of this.      Kathrin Davalos, [facility]  [facility] [place] [facility] [address] [place] [address], [id number] [address]  22.  [phone]

## 2020-07-17 LAB
ALBUMIN SERPL-MCNC: 4.3 G/DL (ref 3.8–4.8)
ALP SERPL-CCNC: 56 IU/L (ref 39–117)
ALT SERPL-CCNC: 41 IU/L (ref 0–44)
AST SERPL-CCNC: 31 IU/L (ref 0–40)
BILIRUB DIRECT SERPL-MCNC: 0.12 MG/DL (ref 0–0.4)
BILIRUB SERPL-MCNC: 0.3 MG/DL (ref 0–1.2)
BUN SERPL-MCNC: 16 MG/DL (ref 8–27)
BUN/CREAT SERPL: 18 (ref 10–24)
CALCIUM SERPL-MCNC: 9.4 MG/DL (ref 8.6–10.2)
CHLORIDE SERPL-SCNC: 103 MMOL/L (ref 96–106)
CO2 SERPL-SCNC: 22 MMOL/L (ref 20–29)
CREAT SERPL-MCNC: 0.89 MG/DL (ref 0.76–1.27)
ERYTHROCYTE [DISTWIDTH] IN BLOOD BY AUTOMATED COUNT: 13.4 % (ref 11.6–15.4)
GLUCOSE SERPL-MCNC: 117 MG/DL (ref 65–99)
HCT VFR BLD AUTO: 42.1 % (ref 37.5–51)
HGB BLD-MCNC: 13.8 G/DL (ref 13–17.7)
MCH RBC QN AUTO: 27.5 PG (ref 26.6–33)
MCHC RBC AUTO-ENTMCNC: 32.8 G/DL (ref 31.5–35.7)
MCV RBC AUTO: 84 FL (ref 79–97)
PLATELET # BLD AUTO: 218 X10E3/UL (ref 150–450)
POTASSIUM SERPL-SCNC: 4.1 MMOL/L (ref 3.5–5.2)
PROT SERPL-MCNC: 7.2 G/DL (ref 6–8.5)
RBC # BLD AUTO: 5.01 X10E6/UL (ref 4.14–5.8)
SODIUM SERPL-SCNC: 140 MMOL/L (ref 134–144)
WBC # BLD AUTO: 4.6 X10E3/UL (ref 3.4–10.8)

## 2020-07-23 LAB
HCV RNA SERPL NAA+PROBE-ACNC: NORMAL IU/ML
HCV RNA SERPL NAA+PROBE-LOG IU: 6.78 LOG10 IU/ML
HCV RNA SERPL QL NAA+PROBE: POSITIVE
TEST INFORMATION: NORMAL

## 2020-08-06 ENCOUNTER — TELEPHONE (OUTPATIENT)
Dept: HEMATOLOGY | Age: 60
End: 2020-08-06

## 2020-08-06 ENCOUNTER — DOCUMENTATION ONLY (OUTPATIENT)
Dept: HEMATOLOGY | Age: 60
End: 2020-08-06

## 2020-08-06 NOTE — PROGRESS NOTES
Called pt back. He is on his 3rd dose pack today. He has not had alcohol since Sunday. He asked if he can have one beer. I told him that was okay. He was also concerned about the HCV medication interfering with any current medications. I assured him I checked that before prescribing the medication. I will have front office call him to set up a virtual visit in about 3 weeks to check viral load and LFTs.

## 2020-08-24 ENCOUNTER — DOCUMENTATION ONLY (OUTPATIENT)
Dept: HEMATOLOGY | Age: 60
End: 2020-08-24

## 2020-08-24 NOTE — PROGRESS NOTES
Pharmacy called stating patient didn't agree for refill shipment of [address]. Called patient to verify. He was unsure of how long he was to stay on treatment. Clarified it was for 8 weeks and he needs refill. Pharmacy name and phone number given to the patient. He has meds until Sunday and will call today to get refill shipped. Follow up is scheduled in CC.

## 2020-09-17 ENCOUNTER — VIRTUAL VISIT (OUTPATIENT)
Dept: HEMATOLOGY | Age: 60
End: 2020-09-17
Payer: MEDICAID

## 2020-09-17 DIAGNOSIS — B18.2 CHRONIC HEPATITIS C WITHOUT HEPATIC COMA (HCC): Primary | ICD-10-CM

## 2020-09-17 PROCEDURE — 99213 OFFICE O/P EST LOW 20 MIN: CPT | Performed by: NURSE PRACTITIONER

## 2020-09-17 NOTE — PROGRESS NOTES
Marilyn Gonzalez is a 56 y.o. male, evaluated via audio-only technology on 9/17/2020 for No chief complaint on file. .  Assessment & Plan:   Chronic HCV. Continue medication. Check labs today. 12  Subjective: Mother passed away yesterday in hospice. Prior to Admission medications    Medication Sig Start Date End Date Taking? Authorizing Provider   glecaprevir-pibrentasvir (Mavyret) 100-40 mg tab Take 3 Tabs by mouth daily. Indications: chronic infection of genotype 1 hepatitis C virus 7/16/20   Rolan Carrera NP   SITagliptin (JANUVIA) 50 mg tablet Take 1 Tab by mouth daily. BHARAT jardiance 7/1/20   Marcia Saldivar MD   lisinopriL (PRINIVIL, ZESTRIL) 2.5 mg tablet Take 1 Tab by mouth daily. BHARAT lisinopril 5 mg 7/1/20   Marcia Saldivar MD   glimepiride (AMARYL) 2 mg tablet Take 1 Tab by mouth two (2) times a day. 7/1/20   Marcia Saldivar MD   metFORMIN (GLUCOPHAGE) 1,000 mg tablet Take 1 Tab by mouth two (2) times daily (with meals). 7/1/20   Marcia Saldivar MD   baclofen (LIORESAL) 20 mg tablet Take 1 Tab by mouth two (2) times daily as needed for Pain. 11/6/19   Marcia Saldivar MD   acetaminophen (TYLENOL) 325 mg tablet Take 2 Tabs by mouth three (3) times daily as needed for Pain. 11/6/19   Marcia Saldivar MD   folic acid (FOLVITE) 1 mg tablet Take 1 Tab by mouth daily. 9/7/19   July Burnham NP   multivitamin, tx-iron-ca-min (THERA-M W/ IRON) 9 mg iron-400 mcg tab tablet Take 1 Tab by mouth daily. 9/7/19   July Burnham NP   thiamine mononitrate (B-1) 100 mg tablet Take 1 Tab by mouth daily.  9/7/19   July Burnham NP   tadalafil (CIALIS) 5 mg tablet Take 1 Tab by mouth daily as needed (ED). 5/9/19   Marcia Saldivar MD   glucose blood VI test strips (BLOOD GLUCOSE TEST) strip Check BG 1-2 times/day 7/6/17   General Marizol MD   Blood-Glucose Meter (RELION ALL-IN-ONE METER) monitoring kit Check BG 1-2 times/day 11/30/16   General Marizol MD         ROS below    Patient-Reported Vitals 7/1/2020 Patient-Reported Weight 180lb   Patient-Reported Height 6f        Parul Garcia, who was evaluated through a patient-initiated, synchronous (real-time) audio only encounter, and/or her healthcare decision maker, is aware that it is a billable service, with coverage as determined by his insurance carrier. He provided verbal consent to proceed: yes. He has not had a related appointment within my department in the past 7 days or scheduled within the next 24 hours. Total Time: 11-20 minutes    Viviana Minaya NP           [facility], [place], [address] [place] [address], [address] Post, MD Benny Cervantes, PA-DION Kimball, ACNP-BC     April S Monica, AGPCNP-BC   Rodrigo Real, FNP-C    Mendy Herr, [facility]-BC       [facility] 136    at [address] 22.    [phone]    FAX: [address] [place] [address], [address]    [phone]    FAX: [phone]     Patient Care Team:  Maya Strickland MD as PCP - General (Internal Medicine)  Maya Strickland MD as PCP - [facility]  Brando Fritz MD as Consulting Provider (Endocrinology)    Patient Active Problem List   Diagnosis Code    Eczema L30.9    Erectile dysfunction N52.9    Diabetes mellitus ([address] E11.9    Type 2 diabetes with nephropathy ([address] E11.21    Dehydration E86.0    Diabetes ([address] E11.9    ASHLEY (acute kidney injury) ([address] N17.9    Tobacco abuse Z72.0    ETOH abuse F10.10     Parul Garcia is being seen at [address] for management of chronic HCV.      The active problem list, all pertinent past medical history, medications, liver histology,   radiologic findings and laboratory findings related to the liver disorder were reviewed with the patient. The patient is a 56 y.o. Black male who was found to have abnormalities in liver chemistries and subsequently tested positive for chronic HCV in 2019. Imaging of the liver was either not performed or the results are not available to me. An assessment of liver fibrosis with HCV FibroSure suggests F1. The patient is currently on 8 weeks of [address]. There was an issue getting his second box but it was remedied. He has a few days left at this point. The patient has no symptoms which can be attributed to the liver disorder. The patient has not experienced the following symptoms: arthralgias. The patient has mild limitations in functional activities which can be attributed to other medical problems not related to the liver disease. His mother passed away yesterday in hospice. ASSESSMENT AND PLAN:  Chronic HCV   Chronic HCV with F1 fibrosis. Liver transaminases are normal. ALP is normal. Liver function is normal. Total bilirubin is normal. Serum albumin is normal. The platelet count is normal.     Will perform laboratory testing to monitor liver function and degree of liver injury. This included BMP, hepatic panel and CBC with platelet count. Chronic HCV treatment  The patient has almost completed 8 weeks of Mavyret. I will send labs to assess his response to medication therapy. Screening for hepatocellular carcinoma  HCC screening is not necessary if the patient has no evidence of cirrhosis. Treatment of other medical problems in patients with chronic liver disease  There are no contraindications for the patient to take most medications necessary for treatment of other medical issues. The patient can take any medications utilized for treatment of DM and/or statins to treat hypercholesterolemia. The patient does not consume alcohol on a daily basis.  Normal doses of acetaminophen, as recommended on the label of the bottle, are not hepatotoxic except in the setting of daily alcohol use. Even patients with cirrhosis can utilize acetaminophen for pain. Counseling for alcohol in patients with chronic liver disease  The patient was counseled regarding alcohol consumption and the effect of alcohol on chronic liver disease. The patient was reminded alcohol can cause fatty liver. Vaccinations   Recommend vaccination against viral hepatitis B. He has documented immunity against viral hepatitis A. Routine vaccinations against other bacterial and viral agents can be performed as indicated. Annual flu vaccination should be administered if indicated. ALLERGIES  No Known Allergies    Current Outpatient Medications on File Prior to Visit   Medication Sig Dispense Refill    glecaprevir-pibrentasvir (Mavyret) 100-40 mg tab Take 3 Tabs by mouth daily. Indications: chronic infection of genotype 1 hepatitis C virus 84 Tab 1    SITagliptin (JANUVIA) 50 mg tablet Take 1 Tab by mouth daily. DC jardiance 30 Tab 5    lisinopriL (PRINIVIL, ZESTRIL) 2.5 mg tablet Take 1 Tab by mouth daily. DC lisinopril 5 mg 30 Tab 5    glimepiride (AMARYL) 2 mg tablet Take 1 Tab by mouth two (2) times a day. 60 Tab 6    metFORMIN (GLUCOPHAGE) 1,000 mg tablet Take 1 Tab by mouth two (2) times daily (with meals). 60 Tab 6    baclofen (LIORESAL) 20 mg tablet Take 1 Tab by mouth two (2) times daily as needed for Pain. 30 Tab 0    acetaminophen (TYLENOL) 325 mg tablet Take 2 Tabs by mouth three (3) times daily as needed for Pain. 60 Tab 2    folic acid (FOLVITE) 1 mg tablet Take 1 Tab by mouth daily. 30 Tab 0    multivitamin, tx-iron-ca-min (THERA-M W/ IRON) 9 mg iron-400 mcg tab tablet Take 1 Tab by mouth daily. 30 Tab 0    thiamine mononitrate (B-1) 100 mg tablet Take 1 Tab by mouth daily. 30 Tab 0    tadalafil (CIALIS) 5 mg tablet Take 1 Tab by mouth daily as needed (ED).  10 Tab 0    glucose blood VI test strips (BLOOD GLUCOSE TEST) strip Check BG 1-2 times/day 100 Strip 11    Blood-Glucose Meter (RELION ALL-IN-ONE METER) monitoring kit Check BG 1-2 times/day 1 Kit 0     No current facility-administered medications on file prior to visit. SYSTEM REVIEW NOT RELATED TO LIVER DISEASE OR REVIEWED ABOVE:  Constitution systems: Negative for fever, chills, weight gain, weight loss. Eyes: Negative for visual changes. ENT: Negative for sore throat, painful swallowing. Respiratory: Negative for cough, hemoptysis, SOB. Cardiology: Negative for chest pain, palpitations. GI:  Negative for constipation or diarrhea. : Negative for urinary frequency, dysuria, hematuria, nocturia. Skin: Negative for rash. Hematology: Negative for easy bruising, blood clots. Musculo-skeletal: Negative for back pain, muscle pain, weakness. Neurologic: Negative for headaches, dizziness, vertigo, memory problems not related to HE. Psychology: Negative for anxiety, depression. FAMILY HISTORY:  The father  from unknown cause while the patient was in long term. The mother passed away 2020. There is no family history of liver disease. His brother  from [address] [place] [address]. There is no family history of immune disorders. SOCIAL HISTORY:  The patient has a girlfriend. The patient has 5 children and lots of grandchildren. The patient smokes 1/2 PPD. The patient drinks 2-3 beers about 3 times a week. The patient collects SSI due to arthritis. PHYSICAL EXAMINATION:  No physical exam was performed.      LABORATORY STUDIES:  Liver [place] of [address] Units 2020   WBC 3.4 - 10.8 x10E3/uL 4.6 4.2   ANC 1.8 - 8.0 K/UL  3.2   HGB 13.0 - 17.7 g/dL 13.8 13.7    - 450 x10E3/uL 218 180   INR 0.8 - 1.2     AST 0 - 40 IU/L 31 69 (H)   ALT 0 - 44 IU/L 41 75   Alk Phos 39 - 117 IU/L 56 62   Bili, Total 0.0 - 1.2 mg/dL 0.3 0.2   Bili, Direct 0.00 - 0.40 mg/dL 0.12    Albumin 3.8 - 4.8 g/dL 4.3 3.4 (L)   BUN 8 - 27 mg/dL 16 10   BUN (iSTAT) 9 - 20 mg/dL Creat 0.76 - 1.27 mg/dL 0.89 0.97   Creat (iSTAT) 0.6 - 1.3 mg/dL     Na 134 - 144 mmol/L 140 140   K 3.5 - 5.2 mmol/L 4.1 3.5   Cl 96 - 106 mmol/L 103 104   CO2 20 - 29 mmol/L 22 26   Glucose 65 - 99 mg/dL 117 (H) 273 (H)   Magnesium 1.6 - 2.4 mg/dL       Liver [place] [address] Ref Rng & Units 2/27/2020 2/27/2020   WBC 3.4 - 10.8 x10E3/uL  4.7   ANC 1.8 - 8.0 K/UL     HGB 13.0 - 17.7 g/dL  15.3    - 450 x10E3/uL  129 (L)   INR 0.8 - 1.2  1.0   AST 0 - 40 IU/L  37   ALT 0 - 44 IU/L 62 (H) 47 (H)   Alk Phos 39 - 117 IU/L  68   Bili, Total 0.0 - 1.2 mg/dL 0.2 0.4   Bili, Direct 0.00 - 0.40 mg/dL  0.13   Albumin 3.8 - 4.8 g/dL  4.2   BUN 8 - 27 mg/dL  14   BUN (iSTAT) 9 - 20 mg/dL     Creat 0.76 - 1.27 mg/dL  0.80   Creat (iSTAT) 0.6 - 1.3 mg/dL     Na 134 - 144 mmol/L  141   K 3.5 - 5.2 mmol/L  4.4   Cl 96 - 106 mmol/L  102   CO2 20 - 29 mmol/L  24   Glucose 65 - 99 mg/dL  116 (H)   Magnesium 1.6 - 2.4 mg/dL       From 11/2019  AST/ALT/ALP/T Bili/ALB: 26/33/66/0.3/4.5  BUN/CREAT: 14/0.60    SEROLOGIES:  Virology Latest Ref Rng & Units 2/27/2020   HCV RNA (IU) IU/mL 14,000,000   HCV RNA, DAISY, QL Negative Positive (A)     Serologies Latest Ref Rng & Units 2/27/2020 9/6/2019   Hep A Ab, Total Negative Positive (A)    Hep B Surface Ag Negative Negative    Hep B Surface Ag Index     Hep B Surface Ag Interp NEG       Hep B Core Ab, Total Negative Negative    Hep B Surface AB QL  Non Reactive    Hep C Ab NR       Hep C Genotype  1a    HCV RT-PCR, Quant IU/mL See Final Results    C-ANCA Neg:<1:20 titer  <1:20   P-ANCA Neg:<1:20 titer  <1:20   ANCA Neg:<1:20 titer  <1:20     Serologies Latest Ref Rng & Units 9/5/2019   Hep A Ab, Total Negative    Hep B Surface Ag Negative    Hep B Surface Ag Index <0.10   Hep B Surface Ag Interp NEG   NEGATIVE   Hep B Core Ab, Total Negative    Hep B Surface AB QL     Hep C Ab NR   REACTIVE (A)   Hep C Genotype     HCV RT-PCR, Quant IU/mL    C-ANCA Neg:<1:20 titer P-ANCA Neg:<1:20 titer    ANCA Neg:<1:20 titer      11/2019. HCV RNA qual positive. LIVER HISTOLOGY:  2/2020. FibroSure. F1    ENDOSCOPIC PROCEDURES:  Not available or performed    RADIOLOGY:  3/2020. Abdominal ultrasound. Mildly echogenic and heterogeneous liver can be seen with hepatic steatosis or nonspecific parenchymal liver disease. No focal liver mass. OTHER TESTING:  Not available or performed    FOLLOW-UP:  All of the issues listed above in the assessment and plan were discussed with the patient. All questions were answered. The patient expressed a clear understanding of the above. [facility] 87 in 3 1/2 months for SVR.      GRAZYNA Bill-BC  Liver [place] [facility] [address] [place] [address], [address] [address] Kim  22.  [phone]

## 2020-10-12 ENCOUNTER — OFFICE VISIT (OUTPATIENT)
Dept: HEMATOLOGY | Age: 60
End: 2020-10-12
Payer: MEDICAID

## 2020-10-12 VITALS
BODY MASS INDEX: 24.08 KG/M2 | OXYGEN SATURATION: 98 % | SYSTOLIC BLOOD PRESSURE: 138 MMHG | HEIGHT: 72 IN | TEMPERATURE: 96.4 F | HEART RATE: 84 BPM | WEIGHT: 177.8 LBS | RESPIRATION RATE: 20 BRPM | DIASTOLIC BLOOD PRESSURE: 78 MMHG

## 2020-10-12 DIAGNOSIS — B18.2 CHRONIC HEPATITIS C WITHOUT HEPATIC COMA (HCC): Primary | ICD-10-CM

## 2020-10-12 PROCEDURE — 99213 OFFICE O/P EST LOW 20 MIN: CPT | Performed by: NURSE PRACTITIONER

## 2020-10-12 NOTE — PROGRESS NOTES
[address] [place] [address], MD, MD Erin Marlow PA-C [place], [facility]-BC     April S Monica [facility]-BC   SERGIO Garcia-DION Church [facility]       Jessica [address] 136    at [address], [address] [place] [address], [facility] 22.    [phone]    FAX: [address] [place] [address], [address]    [phone]    FAX: [phone]     Patient Care Team:  Berto Baum MD as PCP - General (Internal Medicine)  Berto Baum MD as PCP - [facility] Provider  Curtis Monaco MD as Consulting Provider (Endocrinology)    Patient Active Problem List   Diagnosis Code    Eczema L30.9    Erectile dysfunction N52.9    Type 2 diabetes with nephropathy ([facility] E11.21    Dehydration E86.0    ASHLEY (acute kidney injury) ([facility] N17.9    Tobacco abuse Z72.0    ETOH abuse F10.10    Chronic hepatitis C without hepatic coma ([facility] B18.2       Jim Zendejas is being seen at [address] for management of chronic HCV. The active problem list, all pertinent past medical history, medications, liver histology, radiologic findings and laboratory findings related to the liver disorder were reviewed with the patient. The patient is a 56 y.o. Black male who was found to have abnormalities in liver chemistries and subsequently tested positive for chronic HCV in 2019. Imaging of the liver was either not performed or the results are not available to me. An assessment of liver fibrosis with HCV FibroSure suggests F1. The patient has completed 8 weeks of therapy with Mavyret Sept 2020. The patient has no symptoms which can be attributed to the liver disorder.     The patient has not experienced the following symptoms: arthralgias. The patient has mild limitations in functional activities which can be attributed to other medical problems not related to the liver disease. ASSESSMENT AND PLAN:  Chronic HCV   Chronic HCV with F1 fibrosis. Liver transaminases are normal. ALP is normal. Liver function is normal. Total bilirubin is normal. Serum albumin is normal. The platelet count is normal.     Based upon laboratory studies, the patient does not appear to have advanced liver disease. Will perform laboratory testing to monitor liver function and degree of liver injury. This included BMP, hepatic panel and CBC with platelet count. Chronic HCV treatment  The patient has completed 8 weeks of Mavyret in September 2020. I will check his viral load and response to medication therapy today. Screening for hepatocellular carcinoma  HCC screening is not necessary if the patient has no evidence of cirrhosis. Treatment of other medical problems in patients with chronic liver disease  There are no contraindications for the patient to take most medications necessary for treatment of other medical issues. The patient can take any medications utilized for treatment of DM and/or statins to treat hypercholesterolemia. The patient does not consume alcohol on a daily basis. Normal doses of acetaminophen, as recommended on the label of the bottle, are not hepatotoxic except in the setting of daily alcohol use. Even patients with cirrhosis can utilize acetaminophen for pain. Counseling for alcohol in patients with chronic liver disease  The patient was counseled regarding alcohol consumption and the effect of alcohol on chronic liver disease. The patient was reminded alcohol can cause fatty liver. Vaccinations   Recommend vaccination against viral hepatitis B. He has documented immunity against viral hepatitis A.  Routine vaccinations against other bacterial and viral agents can be performed as indicated. Annual flu vaccination should be administered if indicated. ALLERGIES  No Known Allergies    Current Outpatient Medications on File Prior to Visit   Medication Sig Dispense Refill    SITagliptin (JANUVIA) 50 mg tablet Take 1 Tab by mouth daily. DC jardiance 30 Tab 5    glimepiride (AMARYL) 2 mg tablet Take 1 Tab by mouth two (2) times a day. 60 Tab 6    metFORMIN (GLUCOPHAGE) 1,000 mg tablet Take 1 Tab by mouth two (2) times daily (with meals). 60 Tab 6    tadalafil (CIALIS) 5 mg tablet Take 1 Tab by mouth daily as needed (ED). 10 Tab 0    [DISCONTINUED] glecaprevir-pibrentasvir (Mavyret) 100-40 mg tab Take 3 Tabs by mouth daily. Indications: chronic infection of genotype 1 hepatitis C virus 84 Tab 1    lisinopriL (PRINIVIL, ZESTRIL) 2.5 mg tablet Take 1 Tab by mouth daily. DC lisinopril 5 mg 30 Tab 5    baclofen (LIORESAL) 20 mg tablet Take 1 Tab by mouth two (2) times daily as needed for Pain. 30 Tab 0    acetaminophen (TYLENOL) 325 mg tablet Take 2 Tabs by mouth three (3) times daily as needed for Pain. 60 Tab 2    folic acid (FOLVITE) 1 mg tablet Take 1 Tab by mouth daily. 30 Tab 0    multivitamin, tx-iron-ca-min (THERA-M W/ IRON) 9 mg iron-400 mcg tab tablet Take 1 Tab by mouth daily. 30 Tab 0    thiamine mononitrate (B-1) 100 mg tablet Take 1 Tab by mouth daily. 30 Tab 0    glucose blood VI test strips (BLOOD GLUCOSE TEST) strip Check BG 1-2 times/day 100 Strip 11    Blood-Glucose Meter (RELION ALL-IN-ONE METER) monitoring kit Check BG 1-2 times/day 1 Kit 0     No current facility-administered medications on file prior to visit. SYSTEM REVIEW NOT RELATED TO LIVER DISEASE OR REVIEWED ABOVE:  Constitution systems: Negative for fever, chills, weight gain, weight loss. Eyes: Negative for visual changes. ENT: Negative for sore throat, painful swallowing. Respiratory: Negative for cough, hemoptysis, SOB. Cardiology: Negative for chest pain, palpitations.   GI: Negative for constipation or diarrhea. : Negative for urinary frequency, dysuria, hematuria, nocturia. Skin: Negative for rash. Hematology: Negative for easy bruising, blood clots. Musculo-skeletal: Negative for back pain, muscle pain, weakness. Neurologic: Negative for headaches, dizziness, vertigo, memory problems not related to HE. Psychology: Negative for anxiety, depression. FAMILY HISTORY:  The father  from unknown cause while the patient was in long term. The mother is alive with hypertension and dementia. There is no family history of liver disease. [facility]  [facility]. There is no family history of immune disorders. SOCIAL HISTORY:  The patient has a girlfriend. The patient has 5 children and lots of grandchildren. The patient smokes 1/2 PPD. The patient drinks 2-3 beers about 3 times a week. The patient collects SSI due to arthritis. PHYSICAL EXAMINATION:  Visit Vitals  /78 (BP 1 Location: Right arm, BP Patient Position: Sitting)   Pulse 84   Temp (!) 96.4 °F (35.8 °C) (Temporal)   Resp 20   Ht 6' (1.829 m)   Wt 177 lb 12.8 oz (80.6 kg)   SpO2 98%   BMI 24.11 kg/m²     General: No acute distress. Eyes: Sclera anicteric. ENT: No oral lesions. Nodes: No adenopathy. Skin: No spider angiomata. No jaundice. No palmar erythema. Respiratory: Lungs clear to auscultation. Cardiovascular: Regular heart rate. No murmurs. No JVD. Abdomen: Soft non-tender. Liver size normal to percussion/palpation. Spleen not palpable. No obvious ascites. Extremities: No edema. No muscle wasting. No gross arthritic changes. Neurologic: Alert and oriented. Cranial nerves grossly intact. No asterixis.     LABORATORY STUDIES:  Liver [place] of [address] [place] [address] Units 2020   WBC 3.4 - 10.8 x10E3/uL 4.6 4.2   ANC 1.8 - 8.0 K/UL  3.2   HGB 13.0 - 17.7 g/dL 13.8 13.7    - 450 x10E3/uL 218 180   INR 0.8 - 1.2     AST 0 - 40 IU/L 31 69 (H)   ALT 0 - 44 IU/L 41 75   Alk Phos 39 - 117 IU/L 56 62   Bili, Total 0.0 - 1.2 mg/dL 0.3 0.2   Bili, Direct 0.00 - 0.40 mg/dL 0.12    Albumin 3.8 - 4.8 g/dL 4.3 3.4 (L)   BUN 8 - 27 mg/dL 16 10   BUN (iSTAT) 9 - 20 mg/dL     Creat 0.76 - 1.27 mg/dL 0.89 0.97   Creat (iSTAT) 0.6 - 1.3 mg/dL     Na 134 - 144 mmol/L 140 140   K 3.5 - 5.2 mmol/L 4.1 3.5   Cl 96 - 106 mmol/L 103 104   CO2 20 - 29 mmol/L 22 26   Glucose 65 - 99 mg/dL 117 (H) 273 (H)   Magnesium 1.6 - 2.4 mg/dL       Liver [place] [address] Ref Rng & Units 2/27/2020 2/27/2020   WBC 3.4 - 10.8 x10E3/uL  4.7   ANC 1.8 - 8.0 K/UL     HGB 13.0 - 17.7 g/dL  15.3    - 450 x10E3/uL  129 (L)   INR 0.8 - 1.2  1.0   AST 0 - 40 IU/L  37   ALT 0 - 44 IU/L 62 (H) 47 (H)   Alk Phos 39 - 117 IU/L  68   Bili, Total 0.0 - 1.2 mg/dL 0.2 0.4   Bili, Direct 0.00 - 0.40 mg/dL  0.13   Albumin 3.8 - 4.8 g/dL  4.2   BUN 8 - 27 mg/dL  14   BUN (iSTAT) 9 - 20 mg/dL     Creat 0.76 - 1.27 mg/dL  0.80   Creat (iSTAT) 0.6 - 1.3 mg/dL     Na 134 - 144 mmol/L  141   K 3.5 - 5.2 mmol/L  4.4   Cl 96 - 106 mmol/L  102   CO2 20 - 29 mmol/L  24   Glucose 65 - 99 mg/dL  116 (H)   Magnesium 1.6 - 2.4 mg/dL       From 11/2019  AST/ALT/ALP/T Bili/ALB: 26/33/66/0.3/4.5  BUN/CREAT: 14/0.60    SEROLOGIES:  Virology Latest Ref Rng & Units 7/16/2020 2/27/2020   HCV RNA (IU) IU/mL  14,000,000   HCV RNA, DAISY, QL Negative Positive (A) Positive (A)     Virology Latest Ref Rng & Units 2/27/2020   HCV RNA (IU) IU/mL 14,000,000   HCV RNA, DAISY, QL Negative Positive (A)     Serologies Latest Ref Rng & Units 2/27/2020 9/6/2019   Hep A Ab, Total Negative Positive (A)    Hep B Surface Ag Negative Negative    Hep B Surface Ag Index     Hep B Surface Ag Interp NEG       Hep B Core Ab, Total Negative Negative    Hep B Surface AB QL  Non Reactive    Hep C Ab NR       Hep C Genotype  1a    HCV RT-PCR, Quant IU/mL See Final Results    C-ANCA Neg:<1:20 titer  <1:20   P-ANCA Neg:<1:20 titer  <1:20   ANCA Neg:<1:20 titer  <1:20     Serologies Latest Ref Rng & Units 9/5/2019   Hep A Ab, Total Negative    Hep B Surface Ag Negative    Hep B Surface Ag Index <0.10   Hep B Surface Ag Interp NEG   NEGATIVE   Hep B Core Ab, Total Negative    Hep B Surface AB QL     Hep C Ab NR   REACTIVE (A)   Hep C Genotype     HCV RT-PCR, Quant IU/mL    C-ANCA Neg:<1:20 titer    P-ANCA Neg:<1:20 titer    ANCA Neg:<1:20 titer      11/2019. HCV RNA qual positive. LIVER HISTOLOGY:  2/2020. FibroSure. F1    ENDOSCOPIC PROCEDURES:  Not available or performed    RADIOLOGY:  3/2020. Abdominal ultrasound. Mildly echogenic and heterogeneous liver can be seen with hepatic steatosis or nonspecific parenchymal liver disease. No focal liver mass. OTHER TESTING:  Not available or performed    FOLLOW-UP:  All of the issues listed above in the assessment and plan were discussed with the patient. All questions were answered. The patient expressed a clear understanding of the above.     GRAZYNA Bennett-BC  Liver [place] [address], [address] [address]ena Kim  22.  [phone]

## 2020-10-12 NOTE — PROGRESS NOTES
Identified pt with two pt identifiers(name and ). Reviewed record in preparation for visit and have obtained necessary documentation. Chief Complaint   Patient presents with    Follow-up     Follow-Up with Konrad Saucedo      Vitals:    10/12/20 0941   BP: 138/78   Pulse: 84   Resp: 20   Temp: (!) 96.4 °F (35.8 °C)   TempSrc: Temporal   SpO2: 98%   Weight: 177 lb 12.8 oz (80.6 kg)   Height: 6' (1.829 m)   PainSc:   0 - No pain       Health Maintenance Review: Patient reminded of \"due or due soon\" health maintenance. I have asked the patient to contact his/her primary care provider (PCP) for follow-up on his/her health maintenance. Coordination of Care Questionnaire:  :   1) Have you been to an emergency room, urgent care, or hospitalized since your last visit? If yes, where when, and reason for visit? no       2. Have seen or consulted any other health care provider since your last visit? If yes, where when, and reason for visit? NO      Patient is accompanied by self I have received verbal consent from Roya Avery to discuss any/all medical information while they are present in the room.

## 2020-10-13 LAB
ALBUMIN SERPL-MCNC: 4.5 G/DL (ref 3.8–4.8)
ALP SERPL-CCNC: 79 IU/L (ref 39–117)
ALT SERPL-CCNC: 12 IU/L (ref 0–44)
AST SERPL-CCNC: 14 IU/L (ref 0–40)
BILIRUB DIRECT SERPL-MCNC: 0.06 MG/DL (ref 0–0.4)
BILIRUB SERPL-MCNC: <0.2 MG/DL (ref 0–1.2)
BUN SERPL-MCNC: 10 MG/DL (ref 8–27)
BUN/CREAT SERPL: 11 (ref 10–24)
CALCIUM SERPL-MCNC: 9.7 MG/DL (ref 8.6–10.2)
CHLORIDE SERPL-SCNC: 104 MMOL/L (ref 96–106)
CO2 SERPL-SCNC: 23 MMOL/L (ref 20–29)
CREAT SERPL-MCNC: 0.91 MG/DL (ref 0.76–1.27)
ERYTHROCYTE [DISTWIDTH] IN BLOOD BY AUTOMATED COUNT: 12.6 % (ref 11.6–15.4)
GLUCOSE SERPL-MCNC: 107 MG/DL (ref 65–99)
HCT VFR BLD AUTO: 40 % (ref 37.5–51)
HGB BLD-MCNC: 13.1 G/DL (ref 13–17.7)
MCH RBC QN AUTO: 27.2 PG (ref 26.6–33)
MCHC RBC AUTO-ENTMCNC: 32.8 G/DL (ref 31.5–35.7)
MCV RBC AUTO: 83 FL (ref 79–97)
PLATELET # BLD AUTO: 235 X10E3/UL (ref 150–450)
POTASSIUM SERPL-SCNC: 4 MMOL/L (ref 3.5–5.2)
PROT SERPL-MCNC: 7.8 G/DL (ref 6–8.5)
RBC # BLD AUTO: 4.81 X10E6/UL (ref 4.14–5.8)
SODIUM SERPL-SCNC: 139 MMOL/L (ref 134–144)
WBC # BLD AUTO: 6.1 X10E3/UL (ref 3.4–10.8)

## 2020-10-15 ENCOUNTER — TRANSCRIBE ORDER (OUTPATIENT)
Dept: FAMILY MEDICINE CLINIC | Age: 60
End: 2020-10-15

## 2020-10-15 LAB — HCV RNA SERPL QL NAA+PROBE: NEGATIVE

## 2020-10-20 NOTE — PROGRESS NOTES
Pt completed therapy in Sept 2020. 4 weeks after completion of therapy. Follow up as scheduled in Dec. SVR check then. Spoke with pt on phoen.
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
## 2020-11-04 ENCOUNTER — VIRTUAL VISIT (OUTPATIENT)
Dept: INTERNAL MEDICINE CLINIC | Age: 60
End: 2020-11-04
Payer: MEDICAID

## 2020-11-04 DIAGNOSIS — I10 ESSENTIAL HYPERTENSION: ICD-10-CM

## 2020-11-04 DIAGNOSIS — E11.9 TYPE 2 DIABETES MELLITUS WITHOUT COMPLICATION, WITHOUT LONG-TERM CURRENT USE OF INSULIN (HCC): Primary | ICD-10-CM

## 2020-11-04 DIAGNOSIS — E11.21 TYPE 2 DIABETES WITH NEPHROPATHY (HCC): ICD-10-CM

## 2020-11-04 DIAGNOSIS — M17.10 ARTHRITIS OF KNEE: ICD-10-CM

## 2020-11-04 PROCEDURE — 99214 OFFICE O/P EST MOD 30 MIN: CPT | Performed by: INTERNAL MEDICINE

## 2020-11-04 RX ORDER — TRAMADOL HYDROCHLORIDE 50 MG/1
50 TABLET ORAL
Qty: 20 TAB | Refills: 0 | Status: SHIPPED | OUTPATIENT
Start: 2020-11-04 | End: 2021-01-18 | Stop reason: SDUPTHER

## 2020-11-04 NOTE — PROGRESS NOTES
Shan Marin is a 56 y.o. male who was seen by synchronous (real-time) audio-video technology on 11/4/2020 for Follow-up and Knee Pain (right)        Assessment & Plan:   Diagnoses and all orders for this visit:    1. Type 2 diabetes mellitus without complication, without long-term current use of insulin (HCC)  Stable diabetes. On an glimepiride, Metformin and Januvia. Will check,    -     HEMOGLOBIN A1C WITH EAG  -     MICROALBUMIN, UR, RAND W/ MICROALB/CREAT RATIO  -     LIPID PANEL    2. Essential hypertension    Stable blood pressure. On lisinopril. Will check,  -     METABOLIC PANEL, COMPREHENSIVE  -     LIPID PANEL    3. Type 2 diabetes with nephropathy (HCC)  Last kidney function test seems stable. 4. Arthritis of knee    Has DJD on the knee. Not able to take any NSAID since had renal insufficiency in the past.  He mentioned topical diclofenac gel is not doing much. Will refer,  -     REFERRAL TO ORTHOPEDICS  -     traMADoL (ULTRAM) 50 mg tablet; Take 1 Tab by mouth every six (6) hours as needed for Pain for up to 30 days. #20, no refill. .        I spent at least 25 minutes on this visit with this established patient. Subjective:   Mr. Kary Interiano is here for follow-up. Report chronic knee pain. Has DJD. He does see orthopedics here. Not able to take any NSAID. Tylenol is not helping him. He tried topical gel, did not help him. No fall or injury. Has diabetes, on multiple medications. Blood sugar seems normal.  Need lab work. Eye checkup up-to-date. Has hypertension, compliant with medicine. Denies chest pain palpitation or shortness of breath. He was an alcoholic, not drinking anymore. Colonoscopy is up-to-date. Need flu vaccine. Prior to Admission medications    Medication Sig Start Date End Date Taking? Authorizing Provider   traMADoL (ULTRAM) 50 mg tablet Take 1 Tab by mouth every six (6) hours as needed for Pain for up to 30 days.  Max Daily Amount: 200 mg. 11/4/20 12/4/20 Yes Kirti Wray MD   SITagliptin (JANUVIA) 50 mg tablet Take 1 Tab by mouth daily. DC jardiance 7/1/20  Yes Kirti Wray MD   lisinopriL (PRINIVIL, ZESTRIL) 2.5 mg tablet Take 1 Tab by mouth daily. DC lisinopril 5 mg 7/1/20  Yes Kirti Wray MD   glimepiride (AMARYL) 2 mg tablet Take 1 Tab by mouth two (2) times a day. 7/1/20  Yes Kirti Wray MD   metFORMIN (GLUCOPHAGE) 1,000 mg tablet Take 1 Tab by mouth two (2) times daily (with meals). 7/1/20  Yes Kirti Wray MD   folic acid (FOLVITE) 1 mg tablet Take 1 Tab by mouth daily. 9/7/19  Yes July Burnham NP   glucose blood VI test strips (BLOOD GLUCOSE TEST) strip Check BG 1-2 times/day 7/6/17  Yes Ines Loya MD   Blood-Glucose Meter (RELION ALL-IN-ONE METER) monitoring kit Check BG 1-2 times/day 11/30/16  Yes Ines Loya MD   baclofen (LIORESAL) 20 mg tablet Take 1 Tab by mouth two (2) times daily as needed for Pain. 11/6/19   Kirti Wray MD   acetaminophen (TYLENOL) 325 mg tablet Take 2 Tabs by mouth three (3) times daily as needed for Pain. 11/6/19   Kirti Wray MD   multivitamin, tx-iron-ca-min (THERA-M W/ IRON) 9 mg iron-400 mcg tab tablet Take 1 Tab by mouth daily. 9/7/19   July Burnham NP   thiamine mononitrate (B-1) 100 mg tablet Take 1 Tab by mouth daily.  9/7/19   July Burnham NP   tadalafil (CIALIS) 5 mg tablet Take 1 Tab by mouth daily as needed (ED). 5/9/19   Kirti Wray MD     Past Medical History:   Diagnosis Date    Arthritis     Chronic hepatitis C without hepatic coma ([facility], [place] [address]     Eczema 7/11/2012       ROS significant for knee pain    Objective:     Patient-Reported Vitals 7/1/2020   Patient-Reported Weight 180lb   Patient-Reported Height 6f          Constitutional: [x] Appears well-developed and well-nourished [x] No apparent distress      [] Abnormal -     Mental status: [x] Alert and awake  [x] Oriented to person/place/time [x] Able to follow commands    [] Abnormal -       HENT: [x] Normocephalic, atraumatic [] Abnormal -   [x] Mouth/Throat: Mucous membranes are moist    External Ears [x] Normal  [] Abnormal -    Neck: [x] No visualized mass [] Abnormal -     Pulmonary/Chest: [x] Respiratory effort normal   [x] No visualized signs of difficulty breathing or respiratory distress        [] Abnormal -      Musculoskeletal: Bilateral knee: Mild pain present. Range of motion moderately restricted. -     Neurological:        [x] No Facial Asymmetry (Cranial nerve 7 motor function) (limited exam due to video visit)          [x] No gaze palsy        [] Abnormal -                    Psychiatric:       [x] Normal Affect [] Abnormal -        [x] No Hallucinations    Other pertinent observable physical exam findings:-        We discussed the expected course, resolution and complications of the diagnosis(es) in detail. Medication risks, benefits, costs, interactions, and alternatives were discussed as indicated. I advised him to contact the office if his condition worsens, changes or fails to improve as anticipated. He expressed understanding with the diagnosis(es) and plan. Beth Miner, who was evaluated through a patient-initiated, synchronous (real-time) audio-video encounter, and/or his healthcare decision maker, is aware that it is a billable service, with coverage as determined by his insurance carrier. He provided verbal consent to proceed: Yes, and patient identification was verified. It was conducted pursuant to the emergency declaration under the [address] [place] [address] authority and the [employer] General Act. A caregiver was present when appropriate. Ability to conduct physical exam was limited. I was in the office. The patient was at home.       Beatriz Knight MD

## 2020-12-22 ENCOUNTER — VIRTUAL VISIT (OUTPATIENT)
Dept: HEMATOLOGY | Age: 60
End: 2020-12-22
Payer: MEDICAID

## 2020-12-22 DIAGNOSIS — B18.2 CHRONIC HEPATITIS C WITHOUT HEPATIC COMA (HCC): Primary | ICD-10-CM

## 2020-12-22 PROCEDURE — 99212 OFFICE O/P EST SF 10 MIN: CPT | Performed by: NURSE PRACTITIONER

## 2020-12-22 NOTE — PROGRESS NOTES
[address] [facility], [address]awanda WAYNE Camille Shields, MD Leeanne Books, PAIronC    Laura Bose, [facility]     Sindy Anderson, [facility]   Elvia Sauceda P-C    Kirt Lozada, [facility]       Jessica Carreno [address]    at [address], [address] [place] [address], [facility] [place] [facility].    [phone]    FAX: [address] [place] [address]    at [address], [address], [address]    [phone]    FAX: [phone]     Patient Care Team:  Darlene Steinberg MD as PCP - General (Internal Medicine)  Darlene Steinberg MD as PCP - [facility]  Natalie Murphy MD as Consulting Provider (Endocrinology)    Patient Active Problem List   Diagnosis Code    Eczema L30.9    Erectile dysfunction N52.9    Type 2 diabetes with nephropathy ([facility] E11.21    Dehydration E86.0    ASHLEY (acute kidney injury) ([facility] N17.9    Tobacco abuse Z72.0    ETOH abuse F10.10    Chronic hepatitis C without hepatic coma ([facility] B18.2     Julian Vines is a 56 y.o. male, evaluated via audio-only technology on 12/22/2020 for No chief complaint on file. .    Assessment & Plan:   Chronic HCV. Check for SVR. Labs ordered. 12  Subjective:   Feels great. No questions. Prior to Admission medications    Medication Sig Start Date End Date Taking? Authorizing Provider   SITagliptin (JANUVIA) 50 mg tablet Take 1 Tab by mouth daily. DC jardiance 7/1/20   Darlene Steinberg MD   lisinopriL (PRINIVIL, ZESTRIL) 2.5 mg tablet Take 1 Tab by mouth daily. DC lisinopril 5 mg 7/1/20   Darlene Steinberg MD   glimepiride (AMARYL) 2 mg tablet Take 1 Tab by mouth two (2) times a day. 7/1/20   Darlene Steinberg MD   metFORMIN (GLUCOPHAGE) 1,000 mg tablet Take 1 Tab by mouth two (2) times daily (with meals).  7/1/20   Darlene Steinberg MD   baclofen (LIORESAL) 20 mg tablet Take 1 Tab by mouth two (2) times daily as needed for Pain. 11/6/19   Sapna Alejandra MD   acetaminophen (TYLENOL) 325 mg tablet Take 2 Tabs by mouth three (3) times daily as needed for Pain. 11/6/19   Sapna Alejandra MD   folic acid (FOLVITE) 1 mg tablet Take 1 Tab by mouth daily. 9/7/19   July Burnham NP   multivitamin, tx-iron-ca-min (THERA-M W/ IRON) 9 mg iron-400 mcg tab tablet Take 1 Tab by mouth daily. 9/7/19   July Burnham NP   thiamine mononitrate (B-1) 100 mg tablet Take 1 Tab by mouth daily. 9/7/19   July Burnham NP   tadalafil (CIALIS) 5 mg tablet Take 1 Tab by mouth daily as needed (ED). 5/9/19   Sapna Alejandra MD   glucose blood VI test strips (BLOOD GLUCOSE TEST) strip Check BG 1-2 times/day 7/6/17   Kathy Guzman MD   Blood-Glucose Meter (RELION ALL-IN-ONE METER) monitoring kit Check BG 1-2 times/day 11/30/16   Kathy Guzman MD         ROS     Patient-Reported Vitals 7/1/2020   Patient-Reported Weight 180lb   Patient-Reported Height 6f        Mohan Harrell, who was evaluated through a patient-initiated, synchronous (real-time) audio only encounter, and/or her healthcare decision maker, is aware that it is a billable service, with coverage as determined by his insurance carrier. He provided verbal consent to proceed: yes. He has not had a related appointment within my department in the past 7 days or scheduled within the next 24 hours. Total Time: 5-10 minutes    Viviana Gr NP             Mohan Harrell is being seen at [address] for management of chronic HCV. The active problem list, all pertinent past medical history, medications, liver histology, radiologic findings and laboratory findings related to the liver disorder were reviewed with the patient. The patient is a 56 y.o. Black male who was found to have abnormalities in liver chemistries and subsequently tested positive for chronic HCV in 2019.      Imaging of the liver was either not performed or the results are not available to me. An assessment of liver fibrosis with HCV FibroSure suggests F1. The patient has completed 8 weeks of therapy with Mavyret Sept 2020. His virus was undetected at 4 weeks on treatment. The patient has no symptoms which can be attributed to the liver disorder. The patient has not experienced the following symptoms: arthralgias. The patient has mild limitations in functional activities which can be attributed to other medical problems not related to the liver disease. ASSESSMENT AND PLAN:  Chronic HCV   Chronic HCV with F1 fibrosis. Liver transaminases are normal. ALP is normal. Liver function is normal. Total bilirubin is normal. Serum albumin is normal. The platelet count is normal.     Based upon laboratory studies, the patient does not appear to have advanced liver disease. Will perform laboratory testing to monitor liver function and degree of liver injury. This included BMP, hepatic panel and CBC with platelet count. Chronic HCV treatment  The patient has completed 8 weeks of Mavyret in September 2020. I will check his viral load and response to medication therapy today. This will be his SVR lab draw. Screening for hepatocellular carcinoma  HCC screening is not necessary if the patient has no evidence of cirrhosis. Treatment of other medical problems in patients with chronic liver disease  There are no contraindications for the patient to take most medications necessary for treatment of other medical issues. The patient can take any medications utilized for treatment of DM and/or statins to treat hypercholesterolemia. The patient does not consume alcohol on a daily basis. Normal doses of acetaminophen, as recommended on the label of the bottle, are not hepatotoxic except in the setting of daily alcohol use. Even patients with cirrhosis can utilize acetaminophen for pain.     Counseling for alcohol in patients with chronic liver disease  The patient was counseled regarding alcohol consumption and the effect of alcohol on chronic liver disease. The patient was reminded alcohol can cause fatty liver. Vaccinations   Recommend vaccination against viral hepatitis B. He has documented immunity against viral hepatitis A. Routine vaccinations against other bacterial and viral agents can be performed as indicated. Annual flu vaccination should be administered if indicated. ALLERGIES  No Known Allergies    Current Outpatient Medications on File Prior to Visit   Medication Sig Dispense Refill    SITagliptin (JANUVIA) 50 mg tablet Take 1 Tab by mouth daily. DC jardiance 30 Tab 5    lisinopriL (PRINIVIL, ZESTRIL) 2.5 mg tablet Take 1 Tab by mouth daily. DC lisinopril 5 mg 30 Tab 5    glimepiride (AMARYL) 2 mg tablet Take 1 Tab by mouth two (2) times a day. 60 Tab 6    metFORMIN (GLUCOPHAGE) 1,000 mg tablet Take 1 Tab by mouth two (2) times daily (with meals). 60 Tab 6    baclofen (LIORESAL) 20 mg tablet Take 1 Tab by mouth two (2) times daily as needed for Pain. 30 Tab 0    acetaminophen (TYLENOL) 325 mg tablet Take 2 Tabs by mouth three (3) times daily as needed for Pain. 60 Tab 2    folic acid (FOLVITE) 1 mg tablet Take 1 Tab by mouth daily. 30 Tab 0    multivitamin, tx-iron-ca-min (THERA-M W/ IRON) 9 mg iron-400 mcg tab tablet Take 1 Tab by mouth daily. 30 Tab 0    thiamine mononitrate (B-1) 100 mg tablet Take 1 Tab by mouth daily. 30 Tab 0    tadalafil (CIALIS) 5 mg tablet Take 1 Tab by mouth daily as needed (ED). 10 Tab 0    glucose blood VI test strips (BLOOD GLUCOSE TEST) strip Check BG 1-2 times/day 100 Strip 11    Blood-Glucose Meter (RELION ALL-IN-ONE METER) monitoring kit Check BG 1-2 times/day 1 Kit 0     No current facility-administered medications on file prior to visit.       SYSTEM REVIEW NOT RELATED TO LIVER DISEASE OR REVIEWED ABOVE:  Constitution systems: Negative for fever, chills, weight gain, weight loss. Eyes: Negative for visual changes. ENT: Negative for sore throat, painful swallowing. Respiratory: Negative for cough, hemoptysis, SOB. Cardiology: Negative for chest pain, palpitations. GI:  Negative for constipation or diarrhea. : Negative for urinary frequency, dysuria, hematuria, nocturia. Skin: Negative for rash. Hematology: Negative for easy bruising, blood clots. Musculo-skeletal: Negative for back pain, muscle pain, weakness. Neurologic: Negative for headaches, dizziness, vertigo, memory problems not related to HE. Psychology: Negative for anxiety, depression. FAMILY HISTORY:  The father  from unknown cause while the patient was in alf. The mother is alive with hypertension and dementia. There is no family history of liver disease. His brother  from [address] [facility]. There is no family history of immune disorders. SOCIAL HISTORY:  The patient has a girlfriend. The patient has 5 children and lots of grandchildren. The patient smokes 1/2 PPD. The patient drinks 2-3 beers about 3 times a week. The patient collects SSI due to arthritis. PHYSICAL EXAMINATION:  No physical exam performed.      LABORATORY STUDIES:  Liver [place] of [address] Units 10/12/2020 2020   WBC 3.4 - 10.8 x10E3/uL 6.1 4.6   ANC 1.8 - 8.0 K/UL     HGB 13.0 - 17.7 g/dL 13.1 13.8    - 450 x10E3/uL 235 218   INR 0.8 - 1.2     AST 0 - 40 IU/L 14 31   ALT 0 - 44 IU/L 12 41   Alk Phos 39 - 117 IU/L 79 56   Bili, Total 0.0 - 1.2 mg/dL <0.2 0.3   Bili, Direct 0.00 - 0.40 mg/dL 0.06 0.12   Albumin 3.8 - 4.8 g/dL 4.5 4.3   BUN 8 - 27 mg/dL 10 16   BUN (iSTAT) 9 - 20 mg/dL     Creat 0.76 - 1.27 mg/dL 0.91 0.89   Creat (iSTAT) 0.6 - 1.3 mg/dL     Na 134 - 144 mmol/L 139 140   K 3.5 - 5.2 mmol/L 4.0 4.1   Cl 96 - 106 mmol/L 104 103   CO2 20 - 29 mmol/L 23 22   Glucose 65 - 99 mg/dL 107 (H) 117 (H)   Magnesium 1.6 - 2.4 mg/dL       [facility]iver [place] of [address] [place] [address] Ref Rng & Units 5/30/2020 2/27/2020   WBC 3.4 - 10.8 x10E3/uL 4.2    ANC 1.8 - 8.0 K/UL 3.2    HGB 13.0 - 17.7 g/dL 13.7     - 450 x10E3/uL 180    INR 0.8 - 1.2     AST 0 - 40 IU/L 69 (H)    ALT 0 - 44 IU/L 75 62 (H)   Alk Phos 39 - 117 IU/L 62    Bili, Total 0.0 - 1.2 mg/dL 0.2 0.2   Bili, Direct 0.00 - 0.40 mg/dL     Albumin 3.8 - 4.8 g/dL 3.4 (L)    BUN 8 - 27 mg/dL 10    BUN (iSTAT) 9 - 20 mg/dL     Creat 0.76 - 1.27 mg/dL 0.97    Creat (iSTAT) 0.6 - 1.3 mg/dL     Na 134 - 144 mmol/L 140    K 3.5 - 5.2 mmol/L 3.5    Cl 96 - 106 mmol/L 104    CO2 20 - 29 mmol/L 26    Glucose 65 - 99 mg/dL 273 (H)    Magnesium 1.6 - 2.4 mg/dL       From 11/2019  AST/ALT/ALP/T Bili/ALB: 26/33/66/0.3/4.5  BUN/CREAT: 14/0.60    SEROLOGIES:  Virology Latest Ref Rng & Units 10/12/2020 7/16/2020   HCV RNA (IU) IU/mL     HCV RNA, DAISY, QL Negative Negative Positive (A)     Virology Latest Ref Rng & Units 2/27/2020   HCV RNA (IU) IU/mL 14,000,000   HCV RNA, DAISY, QL Negative Positive (A)     Serologies Latest Ref Rng & Units 2/27/2020 9/6/2019   Hep A Ab, Total Negative Positive (A)    Hep B Surface Ag Negative Negative    Hep B Surface Ag Index     Hep B Surface Ag Interp NEG       Hep B Core Ab, Total Negative Negative    Hep B Surface AB QL  Non Reactive    Hep C Ab NR       Hep C Genotype  1a    HCV RT-PCR, Quant IU/mL See Final Results    C-ANCA Neg:<1:20 titer  <1:20   P-ANCA Neg:<1:20 titer  <1:20   ANCA Neg:<1:20 titer  <1:20     Serologies Latest Ref Rng & Units 9/5/2019   Hep A Ab, Total Negative    Hep B Surface Ag Negative    Hep B Surface Ag Index <0.10   Hep B Surface Ag Interp NEG   NEGATIVE   Hep B Core Ab, Total Negative    Hep B Surface AB QL     Hep C Ab NR   REACTIVE (A)   Hep C Genotype     HCV RT-PCR, Quant IU/mL    C-ANCA Neg:<1:20 titer    P-ANCA Neg:<1:20 titer    ANCA Neg:<1:20 titer      11/2019. HCV RNA qual positive. LIVER HISTOLOGY:  2/2020. FibroSure.  F1    ENDOSCOPIC PROCEDURES:  Not available or performed    RADIOLOGY:  3/2020. Abdominal ultrasound. Mildly echogenic and heterogeneous liver can be seen with hepatic steatosis or nonspecific parenchymal liver disease. No focal liver mass. OTHER TESTING:  Not available or performed    FOLLOW-UP:  All of the issues listed above in the assessment and plan were discussed with the patient. All questions were answered. The patient expressed a clear understanding of the above.     [facility]  [facility] [address] [facility], [address] Magdalena Kim  22.  [phone]

## 2021-01-18 ENCOUNTER — TRANSCRIBE ORDER (OUTPATIENT)
Dept: FAMILY MEDICINE CLINIC | Age: 61
End: 2021-01-18

## 2021-01-18 ENCOUNTER — VIRTUAL VISIT (OUTPATIENT)
Dept: ENDOCRINOLOGY | Age: 61
End: 2021-01-18
Payer: MEDICAID

## 2021-01-18 DIAGNOSIS — M17.10 ARTHRITIS OF KNEE: ICD-10-CM

## 2021-01-18 DIAGNOSIS — E11.9 TYPE 2 DIABETES MELLITUS WITHOUT COMPLICATION, WITHOUT LONG-TERM CURRENT USE OF INSULIN (HCC): Primary | ICD-10-CM

## 2021-01-18 PROCEDURE — 99442 PR PHYS/QHP TELEPHONE EVALUATION 11-20 MIN: CPT | Performed by: INTERNAL MEDICINE

## 2021-01-18 NOTE — TELEPHONE ENCOUNTER
----- Message from Anila Pettit sent at 1/18/2021  2:59 PM EST -----  Regarding: Dr. Yamilex Lynch Message/Vendor Calls    Caller's first and last name:  Dean Wakefield      Reason for call:  Pt requesting a refill for \"Tramadol\" for arthritic pain in his right knee. Pt is having trouble sleeping due to knee pain. Pt's pharmacy is [facility] on [facility] which is on file. Callback required yes/no and why:  Yes. Pt would like to know if medication can be sent to pharmacy for his knee pain.       Best contact number(s):  [phone]      Details to clarify the request:      Anila Pettit

## 2021-01-18 NOTE — PROGRESS NOTES
This is an established visit conducted via telemedicine by phone only. The patient has been instructed that this meets HIPAA criteria ,that they may receive a bill for these services and acknowledges and agrees to this method of visitation. This is a 56-year-old -American male with a history of type 2 diabetes mellitus x 9 years. I have not seen him since September 2019. 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 Finances have been the major limiting factor to his diabetes management. When Dr. Elvin Leslie saw him last in March his A1c was 13%. She got him back on metformin and glimepiride and his A1c last was 9.8%. He apparently ran out of the medicines for several weeks but is back on the medicine now. He was hospitalized at [facility] in 2019 with hyperglycemia and  dehydration. His creatinine increased to 3.6 but at discharge it was back down to 1.28. The most recent is 0.91. He has recently been followed by the hepatology folks and is completed treatment for hepatitis C. He tells me that he has been taking his medications and Januvia was added to his previous regimen. He has not been able to check blood sugars because of issues with the glucose meters. Is not entirely clear what those issues are. Current Diabetes Medications  Metformin 1000 BID  Amaryl 2 mg BID  Januvia 100     His diet is getting better. He has cut down on the sodas and sweet tea. He does drink orange juice and does admit to eating some candy. Breakfast is an egg sandwich. Lunch can be a sandwich and had been with sweet tea but less frequently. Last night for dinner he had baked chicken with rice and stuffing. He eats chips at night but his girlfriend is encouraging him to avoid that. He denies chest pain, shortness of breath, constipation or diarrhea.   His major complaint is of his knees which has been debilitating for him for quite some time.    Impression type 2 diabetes mellitus with what sounds like better blood sugar control based on laboratory tests obtained by the hepatology folks. Plan: I have ordered a hemoglobin A1c and will call him with the results. I will see him back in 3 to 4 months, face-to-face.     Total time 14 minutes

## 2021-01-19 RX ORDER — TRAMADOL HYDROCHLORIDE 50 MG/1
50 TABLET ORAL
Qty: 20 TAB | Refills: 0 | Status: SHIPPED | OUTPATIENT
Start: 2021-01-19 | End: 2021-02-18

## 2021-01-25 ENCOUNTER — VIRTUAL VISIT (OUTPATIENT)
Dept: HEMATOLOGY | Age: 61
End: 2021-01-25
Payer: MEDICAID

## 2021-01-25 DIAGNOSIS — B18.2 CHRONIC HEPATITIS C WITHOUT HEPATIC COMA (HCC): Primary | ICD-10-CM

## 2021-01-25 PROCEDURE — 99212 OFFICE O/P EST SF 10 MIN: CPT | Performed by: NURSE PRACTITIONER

## 2021-01-25 NOTE — PROGRESS NOTES
Merrill Chandler MD, Kristal Jacinto, MD Eloy Quan PA-C Milta Foerster, [facility]-BC     April S Monica, [facility]   Kim Duran [facility]    Arragini Gregory, [facility]       Jessica Deputado [address]    at [address], [address] [place] [address], [facility] 22.    [phone]    FAX: [address] [place] [address]    at [address], [address]    [phone]    FAX: [phone]     Patient Care Team:  Arvin Gregorio MD as PCP - General (Internal Medicine)  Arvin Gregorio MD as PCP - [address] [place] [address] [facility]  Kelley Magaña MD as Consulting Provider (Endocrinology)    Patient Active Problem List   Diagnosis Code    Eczema L30.9    Erectile dysfunction N52.9    Type 2 diabetes with nephropathy ([facility] E11.21    Dehydration E86.0    ASHLEY (acute kidney injury) ([facility] N17.9    Tobacco abuse Z72.0    ETOH abuse F10.10    Chronic hepatitis C without hepatic coma ([facility] B18.2     Leigha Valladares is a 56 y.o. male, evaluated via audio-only technology on 1/25/2021 for Follow-up (3 month)    Assessment & Plan:   Chronic HCV. Check for SVR. He did not receive the lab slips from our last appointment. He is not sure if [facility] has a lab area. I have had the front office print them off here and hold them up front for him. He will come in the week to get them drawn here. 12  Subjective:   Feels great. No questions. Prior to Admission medications    Medication Sig Start Date End Date Taking? Authorizing Provider   traMADoL (ULTRAM) 50 mg tablet Take 1 Tab by mouth every six (6) hours as needed for Pain for up to 30 days. Max Daily Amount: 200 mg. 1/19/21 2/18/21 Yes Arvin Gregorio MD   SITagliptin (JANUVIA) 50 mg tablet Take 1 Tab by mouth daily.  DC jardiance 7/1/20  Yes Precious Mead MD   lisinopriL (PRINIVIL, ZESTRIL) 2.5 mg tablet Take 1 Tab by mouth daily. DC lisinopril 5 mg 7/1/20  Yes Precious Mead MD   glimepiride (AMARYL) 2 mg tablet Take 1 Tab by mouth two (2) times a day. 7/1/20  Yes Precious Mead MD   metFORMIN (GLUCOPHAGE) 1,000 mg tablet Take 1 Tab by mouth two (2) times daily (with meals). 7/1/20  Yes Precious Mead MD   multivitamin, tx-iron-ca-min (THERA-M W/ IRON) 9 mg iron-400 mcg tab tablet Take 1 Tab by mouth daily. 9/7/19  Yes July Burnham NP   tadalafil (CIALIS) 5 mg tablet Take 1 Tab by mouth daily as needed (ED). 5/9/19  Yes Precious Mead MD   glucose blood VI test strips (BLOOD GLUCOSE TEST) strip Check BG 1-2 times/day 7/6/17  Yes René Flores MD   Blood-Glucose Meter (RELION ALL-IN-ONE METER) monitoring kit Check BG 1-2 times/day 11/30/16  Yes René Flores MD   baclofen (LIORESAL) 20 mg tablet Take 1 Tab by mouth two (2) times daily as needed for Pain. 11/6/19   Precious Mead MD   acetaminophen (TYLENOL) 325 mg tablet Take 2 Tabs by mouth three (3) times daily as needed for Pain. 11/6/19   Precious Mead MD   folic acid (FOLVITE) 1 mg tablet Take 1 Tab by mouth daily. 9/7/19   July Burnham NP   thiamine mononitrate (B-1) 100 mg tablet Take 1 Tab by mouth daily. 9/7/19   Gonzalo Burnham NP         ROS     Patient-Reported Vitals 1/25/2021   Patient-Reported Weight 180lb   Patient-Reported Height -        Eryn Rodriguez, who was evaluated through a patient-initiated, synchronous (real-time) audio only encounter, and/or her healthcare decision maker, is aware that it is a billable service, with coverage as determined by his insurance carrier. He provided verbal consent to proceed: yes. He has not had a related appointment within my department in the past 7 days or scheduled within the next 24 hours. Total Time: 5-10 minutes    KENNY Smyth is being seen at [address] [address] of [place] for management of chronic HCV. The active problem list, all pertinent past medical history, medications, liver histology, radiologic findings and laboratory findings related to the liver disorder were reviewed with the patient. The patient is a 56 y.o. Black male who was found to have abnormalities in liver chemistries and subsequently tested positive for chronic HCV in 2019. Imaging of the liver was either not performed or the results are not available to me. An assessment of liver fibrosis with HCV FibroSure suggests F1. The patient has completed 8 weeks of therapy with Mavyret Sept 2020. His virus was undetected at 4 weeks on treatment. The patient has no symptoms which can be attributed to the liver disorder. The patient has not experienced the following symptoms: arthralgias. The patient has mild limitations in functional activities which can be attributed to other medical problems not related to the liver disease. ASSESSMENT AND PLAN:  Chronic HCV   Chronic HCV with F1 fibrosis. Liver transaminases are normal. ALP is normal. Liver function is normal. Total bilirubin is normal. Serum albumin is normal. The platelet count is normal.     Based upon laboratory studies, the patient does not appear to have advanced liver disease. Will perform laboratory testing to monitor liver function and degree of liver injury. This included BMP, hepatic panel and CBC with platelet count. Chronic HCV treatment  The patient has completed 8 weeks of Mavyret in September 2020. I will check his viral load and response to medication therapy today. This will be his SVR lab draw. He did not get the lab slips from the last visit. I am reprinting them and he will come by the office to get them drawn. Screening for hepatocellular carcinoma  HCC screening is not necessary if the patient has no evidence of cirrhosis.     Treatment of other medical problems in patients with chronic liver disease  There are no contraindications for the patient to take most medications necessary for treatment of other medical issues. The patient can take any medications utilized for treatment of DM and/or statins to treat hypercholesterolemia. The patient does not consume alcohol on a daily basis. Normal doses of acetaminophen, as recommended on the label of the bottle, are not hepatotoxic except in the setting of daily alcohol use. Even patients with cirrhosis can utilize acetaminophen for pain. Counseling for alcohol in patients with chronic liver disease  The patient was counseled regarding alcohol consumption and the effect of alcohol on chronic liver disease. The patient was reminded alcohol can cause fatty liver. Vaccinations   Recommend vaccination against viral hepatitis B. He has documented immunity against viral hepatitis A. Routine vaccinations against other bacterial and viral agents can be performed as indicated. Annual flu vaccination should be administered if indicated. ALLERGIES  No Known Allergies    Current Outpatient Medications on File Prior to Visit   Medication Sig Dispense Refill    traMADoL (ULTRAM) 50 mg tablet Take 1 Tab by mouth every six (6) hours as needed for Pain for up to 30 days. Max Daily Amount: 200 mg. 20 Tab 0    SITagliptin (JANUVIA) 50 mg tablet Take 1 Tab by mouth daily. DC jardiance 30 Tab 5    lisinopriL (PRINIVIL, ZESTRIL) 2.5 mg tablet Take 1 Tab by mouth daily. DC lisinopril 5 mg 30 Tab 5    glimepiride (AMARYL) 2 mg tablet Take 1 Tab by mouth two (2) times a day. 60 Tab 6    metFORMIN (GLUCOPHAGE) 1,000 mg tablet Take 1 Tab by mouth two (2) times daily (with meals). 60 Tab 6    multivitamin, tx-iron-ca-min (THERA-M W/ IRON) 9 mg iron-400 mcg tab tablet Take 1 Tab by mouth daily. 30 Tab 0    tadalafil (CIALIS) 5 mg tablet Take 1 Tab by mouth daily as needed (ED).  10 Tab 0    glucose blood VI test strips (BLOOD GLUCOSE TEST) strip Check BG 1-2 times/day 100 Strip 11    Blood-Glucose Meter (RELION ALL-IN-ONE METER) monitoring kit Check BG 1-2 times/day 1 Kit 0    baclofen (LIORESAL) 20 mg tablet Take 1 Tab by mouth two (2) times daily as needed for Pain. 30 Tab 0    acetaminophen (TYLENOL) 325 mg tablet Take 2 Tabs by mouth three (3) times daily as needed for Pain. 60 Tab 2    folic acid (FOLVITE) 1 mg tablet Take 1 Tab by mouth daily. 30 Tab 0    thiamine mononitrate (B-1) 100 mg tablet Take 1 Tab by mouth daily. 30 Tab 0     No current facility-administered medications on file prior to visit. SYSTEM REVIEW NOT RELATED TO LIVER DISEASE OR REVIEWED ABOVE:  Constitution systems: Negative for fever, chills, weight gain, weight loss. Eyes: Negative for visual changes. ENT: Negative for sore throat, painful swallowing. Respiratory: Negative for cough, hemoptysis, SOB. Cardiology: Negative for chest pain, palpitations. GI:  Negative for constipation or diarrhea. : Negative for urinary frequency, dysuria, hematuria, nocturia. Skin: Negative for rash. Hematology: Negative for easy bruising, blood clots. Musculo-skeletal: Negative for back pain, muscle pain, weakness. Neurologic: Negative for headaches, dizziness, vertigo, memory problems not related to HE. Psychology: Negative for anxiety, depression. FAMILY HISTORY:  The father  from unknown cause while the patient was in prison. The mother is alive with hypertension and dementia. There is no family history of liver disease. His brother  from [address] [place] [address]. There is no family history of immune disorders. SOCIAL HISTORY:  The patient has a girlfriend. The patient has 5 children and lots of grandchildren. The patient smokes 1/2 PPD. The patient drinks 2-3 beers about 3 times a week. The patient collects SSI due to arthritis. PHYSICAL EXAMINATION:  No physical exam performed.      LABORATORY STUDIES:  L[place] [id number] & Units 10/12/2020 7/16/2020   WBC 3.4 - 10.8 x10E3/uL 6.1 4.6   ANC 1.8 - 8.0 K/UL     HGB 13.0 - 17.7 g/dL 13.1 13.8    - 450 x10E3/uL 235 218   INR 0.8 - 1.2     AST 0 - 40 IU/L 14 31   ALT 0 - 44 IU/L 12 41   Alk Phos 39 - 117 IU/L 79 56   Bili, Total 0.0 - 1.2 mg/dL <0.2 0.3   Bili, Direct 0.00 - 0.40 mg/dL 0.06 0.12   Albumin 3.8 - 4.8 g/dL 4.5 4.3   BUN 8 - 27 mg/dL 10 16   BUN (iSTAT) 9 - 20 mg/dL     Creat 0.76 - 1.27 mg/dL 0.91 0.89   Creat (iSTAT) 0.6 - 1.3 mg/dL     Na 134 - 144 mmol/L 139 140   K 3.5 - 5.2 mmol/L 4.0 4.1   Cl 96 - 106 mmol/L 104 103   CO2 20 - 29 mmol/L 23 22   Glucose 65 - 99 mg/dL 107 (H) 117 (H)   Magnesium 1.6 - 2.4 mg/dL       Liver [place] [address] Ref Rng & Units 5/30/2020 2/27/2020   WBC 3.4 - 10.8 x10E3/uL 4.2    ANC 1.8 - 8.0 K/UL 3.2    HGB 13.0 - 17.7 g/dL 13.7     - 450 x10E3/uL 180    INR 0.8 - 1.2     AST 0 - 40 IU/L 69 (H)    ALT 0 - 44 IU/L 75 62 (H)   Alk Phos 39 - 117 IU/L 62    Bili, Total 0.0 - 1.2 mg/dL 0.2 0.2   Bili, Direct 0.00 - 0.40 mg/dL     Albumin 3.8 - 4.8 g/dL 3.4 (L)    BUN 8 - 27 mg/dL 10    BUN (iSTAT) 9 - 20 mg/dL     Creat 0.76 - 1.27 mg/dL 0.97    Creat (iSTAT) 0.6 - 1.3 mg/dL     Na 134 - 144 mmol/L 140    K 3.5 - 5.2 mmol/L 3.5    Cl 96 - 106 mmol/L 104    CO2 20 - 29 mmol/L 26    Glucose 65 - 99 mg/dL 273 (H)    Magnesium 1.6 - 2.4 mg/dL       From 11/2019  AST/ALT/ALP/T Bili/ALB: 26/33/66/0.3/4.5  BUN/CREAT: 14/0.60    SEROLOGIES:  Virology Latest Ref Rng & Units 10/12/2020 7/16/2020   HCV RNA (IU) IU/mL     HCV RNA, DAISY, QL Negative Negative Positive (A)     Virology Latest Ref Rng & Units 2/27/2020   HCV RNA (IU) IU/mL 14,000,000   HCV RNA, DAISY, QL Negative Positive (A)     Serologies Latest Ref Rng & Units 2/27/2020 9/6/2019   Hep A Ab, Total Negative Positive (A)    Hep B Surface Ag Negative Negative    Hep B Surface Ag Index     Hep B Surface Ag Interp NEG       Hep B Core Ab, Total Negative Negative    Hep B Surface AB QL Non Reactive    Hep C Ab NR       Hep C Genotype  1a    HCV RT-PCR, Quant IU/mL See Final Results    C-ANCA Neg:<1:20 titer  <1:20   P-ANCA Neg:<1:20 titer  <1:20   ANCA Neg:<1:20 titer  <1:20     Serologies Latest Ref Rng & Units 9/5/2019   Hep A Ab, Total Negative    Hep B Surface Ag Negative    Hep B Surface Ag Index <0.10   Hep B Surface Ag Interp NEG   NEGATIVE   Hep B Core Ab, Total Negative    Hep B Surface AB QL     Hep C Ab NR   REACTIVE (A)   Hep C Genotype     HCV RT-PCR, Quant IU/mL    C-ANCA Neg:<1:20 titer    P-ANCA Neg:<1:20 titer    ANCA Neg:<1:20 titer      11/2019. HCV RNA qual positive. LIVER HISTOLOGY:  2/2020. FibroSure. F1    ENDOSCOPIC PROCEDURES:  Not available or performed    RADIOLOGY:  3/2020. Abdominal ultrasound. Mildly echogenic and heterogeneous liver can be seen with hepatic steatosis or nonspecific parenchymal liver disease. No focal liver mass. OTHER TESTING:  Not available or performed    FOLLOW-UP:  All of the issues listed above in the assessment and plan were discussed with the patient. All questions were answered. The patient expressed a clear understanding of the above.     Marie Monte, [facility]-BC  [place]iver [place] [address] [facility], [address] [facility]  [address] [facility] 22.  [phone]

## 2021-01-25 NOTE — PROGRESS NOTES
Adilson Rodriguez is a 60 y.o. male  Chief Complaint   Patient presents with   • Follow-up     3 month     Health Maintenance Due   Topic Date Due   • Eye Exam Retinal or Dilated  08/11/1968   • COVID-19 Vaccine (1 of 2) 08/11/1974   • DTaP/Tdap/Td series (1 - Tdap) 08/11/1979   • Shingrix Vaccine Age 50> (1 of 2) 08/11/2008   • Pneumococcal 0-64 years (1 of 1 - PPSV23) 11/06/2019   • Lipid Screen  03/11/2020   • Foot Exam Q1  05/09/2020   • Flu Vaccine (1) 09/01/2020   • A1C test (Diabetic or Prediabetic)  11/06/2020   • MICROALBUMIN Q1  11/06/2020     There were no vitals taken for this visit.    Patient-Reported Vitals 1/25/2021   Patient-Reported Weight 180lb   Patient-Reported Height -

## 2021-02-03 ENCOUNTER — DOCUMENTATION ONLY (OUTPATIENT)
Dept: HEMATOLOGY | Age: 61
End: 2021-02-03

## 2021-02-03 NOTE — PROGRESS NOTES
Pt returned my call and left VM. I called him back and left him another VM. No need to be concerned. Not sure the HCV RNA was ordered when went to get blood drawn. LFTs were normal. Advised pt of all of this on VM.

## 2021-03-08 ENCOUNTER — OFFICE VISIT (OUTPATIENT)
Dept: INTERNAL MEDICINE CLINIC | Age: 61
End: 2021-03-08
Payer: MEDICAID

## 2021-03-08 VITALS
SYSTOLIC BLOOD PRESSURE: 132 MMHG | WEIGHT: 181.2 LBS | DIASTOLIC BLOOD PRESSURE: 78 MMHG | BODY MASS INDEX: 24.54 KG/M2 | OXYGEN SATURATION: 97 % | RESPIRATION RATE: 18 BRPM | TEMPERATURE: 98 F | HEIGHT: 72 IN | HEART RATE: 71 BPM

## 2021-03-08 DIAGNOSIS — E11.9 TYPE 2 DIABETES MELLITUS WITHOUT COMPLICATION, WITHOUT LONG-TERM CURRENT USE OF INSULIN (HCC): Primary | ICD-10-CM

## 2021-03-08 DIAGNOSIS — N52.8 OTHER MALE ERECTILE DYSFUNCTION: ICD-10-CM

## 2021-03-08 DIAGNOSIS — M17.0 OSTEOARTHRITIS OF BOTH KNEES, UNSPECIFIED OSTEOARTHRITIS TYPE: ICD-10-CM

## 2021-03-08 DIAGNOSIS — B18.2 CHRONIC HEPATITIS C WITHOUT HEPATIC COMA (HCC): ICD-10-CM

## 2021-03-08 PROCEDURE — 99214 OFFICE O/P EST MOD 30 MIN: CPT | Performed by: INTERNAL MEDICINE

## 2021-03-08 RX ORDER — GLIMEPIRIDE 2 MG/1
2 TABLET ORAL 2 TIMES DAILY
Qty: 60 TAB | Refills: 6 | Status: SHIPPED | OUTPATIENT
Start: 2021-03-08 | End: 2021-09-27 | Stop reason: SDUPTHER

## 2021-03-08 RX ORDER — LANCETS
EACH MISCELLANEOUS
Qty: 100 EACH | Refills: 11 | Status: SHIPPED | OUTPATIENT
Start: 2021-03-08 | End: 2021-09-27 | Stop reason: SDUPTHER

## 2021-03-08 RX ORDER — IBUPROFEN 200 MG
CAPSULE ORAL
Qty: 100 STRIP | Refills: 11 | Status: SHIPPED | OUTPATIENT
Start: 2021-03-08 | End: 2021-09-27 | Stop reason: SDUPTHER

## 2021-03-08 RX ORDER — METFORMIN HYDROCHLORIDE 1000 MG/1
1000 TABLET ORAL 2 TIMES DAILY WITH MEALS
Qty: 60 TAB | Refills: 6 | Status: SHIPPED | OUTPATIENT
Start: 2021-03-08 | End: 2022-01-06 | Stop reason: SDUPTHER

## 2021-03-08 RX ORDER — TADALAFIL 5 MG/1
5 TABLET ORAL
Qty: 10 TAB | Refills: 0 | Status: SHIPPED | OUTPATIENT
Start: 2021-03-08 | End: 2021-03-12 | Stop reason: ALTCHOICE

## 2021-03-08 RX ORDER — INSULIN PUMP SYRINGE, 3 ML
EACH MISCELLANEOUS
Qty: 1 KIT | Refills: 0 | Status: SHIPPED | OUTPATIENT
Start: 2021-03-08 | End: 2021-09-27 | Stop reason: SDUPTHER

## 2021-03-08 NOTE — PROGRESS NOTES
HISTORY OF PRESENT ILLNESS  Zakia Blevins is a 56 y.o. male here for follow-up. He has seen knee specialist, got an injection. Knee feels better. Would like to get a DMV form. Has diabetes, seeing endocrinologist.  Watching diet. Need refill on medications. Need foot exam and eye checkup. Has hypertension, blood pressure seems stable. No chest pain palpitation or shortness of breath. He is not drinking alcohol right now. All labs reviewed, imaging reviewed. Medications are reconciled. Has erectile dysfunction. Would like to get a refill on Cialis. Diabetes    Hypertension     Knee Pain    Medication Refill    Erectile Dysfunction        Review of Systems   Constitutional: Negative. HENT: Negative. Eyes: Negative. Respiratory: Negative. Cardiovascular: Negative. Gastrointestinal: Negative. Genitourinary: Negative. Musculoskeletal: Positive for joint pain. Skin: Negative. Neurological: Negative. Endo/Heme/Allergies: Negative. Psychiatric/Behavioral: Negative. Physical Exam  Constitutional:       General: He is not in acute distress. Appearance: Normal appearance. He is well-developed. Neck:      Musculoskeletal: Normal range of motion and neck supple. Thyroid: No thyromegaly. Vascular: No JVD. Cardiovascular:      Rate and Rhythm: Normal rate and regular rhythm. Pulses: Normal pulses. Heart sounds: Normal heart sounds. Pulmonary:      Effort: Pulmonary effort is normal. No respiratory distress. Breath sounds: Normal breath sounds. No wheezing. Musculoskeletal:         General: Tenderness present.       Comments:   Diabetic foot exam:     Left Foot:   Visual Exam: normal    Pulse DP: 2+ (normal)   Filament test: normal sensation    Vibratory sensation: normal      Right Foot:   Visual Exam: normal    Pulse DP: 2+ (normal)   Filament test: normal sensation    Vibratory sensation: normal    Bilateral knee: Mild tenderness in the knee joint. Range of motion restricted. Neurological:      Mental Status: He is alert. Psychiatric:         Mood and Affect: Mood normal.         Behavior: Behavior normal.         ASSESSMENT and PLAN  Diagnoses and all orders for this visit:    1. Type 2 diabetes mellitus without complication, without long-term current use of insulin (HCC)    Stable diabetes. Will refill,  -     metFORMIN (GLUCOPHAGE) 1,000 mg tablet; Take 1 Tab by mouth two (2) times daily (with meals). -     Blood-Glucose Meter (ReliOn All-In-One Meter) monitoring kit; Check BG 1-2 times/day  -     glucose blood VI test strips (blood glucose test) strip; Check BG 1-2 times/day  -     lancets misc; Check BS BID  -     glimepiride (AMARYL) 2 mg tablet; Take 1 Tab by mouth two (2) times a day. -     REFERRAL TO OPHTHALMOLOGY  -     HEMOGLOBIN A1C WITH EAG; Future  -     MICROALBUMIN, UR, RAND W/ MICROALB/CREAT RATIO; Future  -     TESTOSTERONE, FREE & TOTAL; Future  -     LIPID PANEL; Future  -     METABOLIC PANEL, COMPREHENSIVE; Future    2. Other male erectile dysfunction    We will refill,  -     tadalafiL (CIALIS) 5 mg tablet; Take 1 Tab by mouth daily as needed (ED).  -     TESTOSTERONE, FREE & TOTAL; Future    3. Osteoarthritis of both knees, unspecified osteoarthritis type  Seeing orthopedist Dr. Radha Dunlap, a intra-articular injection given. He is able to walk better. Would like to get a [employer] disability parking pass for 6-month. We will do that.   4. Chronic hepatitis C without hepatic coma (HCC)    Hepatitis C free, seeing hepatologist.          Issues reviewed with her: referral to Diabetic Education department, diabetic diet discussed in detail, written exchange diet given, home glucose monitoring emphasized, all medications, side effects and compliance discussed carefully, foot care discussed and Podiatry visits discussed, annual eye examinations at Ophthalmology discussed, long term diabetic complications discussed and labs immediately prior to next visit.

## 2021-03-08 NOTE — PROGRESS NOTES
Health Maintenance Due   Topic Date Due    Eye Exam Retinal or Dilated  Never done    COVID-19 Vaccine (1 of 2) Never done    DTaP/Tdap/Td series (1 - Tdap) Never done    Shingrix Vaccine Age 50> (1 of 2) Never done    Pneumococcal 0-64 years (1 of 1 - PPSV23) 11/06/2019    Lipid Screen  03/11/2020    Flu Vaccine (1) 09/01/2020    A1C test (Diabetic or Prediabetic)  11/06/2020    MICROALBUMIN Q1  11/06/2020       Chief Complaint   Patient presents with    Hepatitis C     4 month follow up    Diabetes       1. Have you been to the ER, urgent care clinic since your last visit? Hospitalized since your last visit? No    2. Have you seen or consulted any other health care providers outside of the [address] [place] [address] since your last visit? Include any pap smears or colon screening. No    3) Do you have an Advance Directive on file? no    4) Are you interested in receiving information on Advance Directives? NO      Patient is accompanied by self I have received verbal consent from Papa Mckeon to discuss any/all medical information while they are present in the room.

## 2021-03-09 ENCOUNTER — DOCUMENTATION ONLY (OUTPATIENT)
Dept: INTERNAL MEDICINE CLINIC | Age: 61
End: 2021-03-09

## 2021-03-09 NOTE — PROGRESS NOTES
Chief Complaint   Patient presents with    Prior Auth     Tadalafil 5MG tablets sent to insurance, awaiting response , Key: [id number] - PA Case ID: [id number] - Rx #: [id number]

## 2021-03-10 ENCOUNTER — TELEPHONE (OUTPATIENT)
Dept: INTERNAL MEDICINE CLINIC | Age: 61
End: 2021-03-10

## 2021-03-10 DIAGNOSIS — N52.9 ERECTILE DYSFUNCTION, UNSPECIFIED ERECTILE DYSFUNCTION TYPE: Primary | ICD-10-CM

## 2021-03-10 NOTE — TELEPHONE ENCOUNTER
Call placed to patient and states cialis cost $100 and wanted a cheaper alternative    Will confer with provider

## 2021-03-10 NOTE — TELEPHONE ENCOUNTER
Patient called upset because he could not get one of his medications from the pharmacy. He did not know which one he wasn't able to get.  Please call him back at [phone]

## 2021-03-11 LAB
ALBUMIN SERPL-MCNC: 4.3 G/DL (ref 3.8–4.8)
ALBUMIN/CREAT UR: 9 MG/G CREAT (ref 0–29)
ALBUMIN/GLOB SERPL: 1.3 {RATIO} (ref 1.2–2.2)
ALP SERPL-CCNC: 74 IU/L (ref 39–117)
ALT SERPL-CCNC: 15 IU/L (ref 0–44)
AST SERPL-CCNC: 20 IU/L (ref 0–40)
BILIRUB SERPL-MCNC: <0.2 MG/DL (ref 0–1.2)
BUN SERPL-MCNC: 12 MG/DL (ref 8–27)
BUN/CREAT SERPL: 14 (ref 10–24)
CALCIUM SERPL-MCNC: 9 MG/DL (ref 8.6–10.2)
CHLORIDE SERPL-SCNC: 103 MMOL/L (ref 96–106)
CHOLEST SERPL-MCNC: 147 MG/DL (ref 100–199)
CO2 SERPL-SCNC: 20 MMOL/L (ref 20–29)
CREAT SERPL-MCNC: 0.85 MG/DL (ref 0.76–1.27)
CREAT UR-MCNC: 145.7 MG/DL
EST. AVERAGE GLUCOSE BLD GHB EST-MCNC: 134 MG/DL
GLOBULIN SER CALC-MCNC: 3.2 G/DL (ref 1.5–4.5)
GLUCOSE SERPL-MCNC: 86 MG/DL (ref 65–99)
HBA1C MFR BLD: 6.3 % (ref 4.8–5.6)
HDLC SERPL-MCNC: 60 MG/DL
IMP & REVIEW OF LAB RESULTS: NORMAL
LDLC SERPL CALC-MCNC: 76 MG/DL (ref 0–99)
Lab: NORMAL
MICROALBUMIN UR-MCNC: 12.8 UG/ML
POTASSIUM SERPL-SCNC: 4 MMOL/L (ref 3.5–5.2)
PROT SERPL-MCNC: 7.5 G/DL (ref 6–8.5)
SODIUM SERPL-SCNC: 138 MMOL/L (ref 134–144)
TESTOST FREE SERPL-MCNC: 6.5 PG/ML (ref 6.6–18.1)
TESTOST SERPL-MCNC: 450 NG/DL (ref 264–916)
TRIGL SERPL-MCNC: 51 MG/DL (ref 0–149)
VLDLC SERPL CALC-MCNC: 11 MG/DL (ref 5–40)

## 2021-03-12 RX ORDER — SILDENAFIL 50 MG/1
50 TABLET, FILM COATED ORAL
Qty: 12 TAB | Refills: 2 | Status: SHIPPED | OUTPATIENT
Start: 2021-03-12 | End: 2021-03-15 | Stop reason: CLARIF

## 2021-03-15 DIAGNOSIS — N52.9 ERECTILE DYSFUNCTION, UNSPECIFIED ERECTILE DYSFUNCTION TYPE: Primary | ICD-10-CM

## 2021-03-15 RX ORDER — SILDENAFIL CITRATE 20 MG/1
TABLET ORAL
Qty: 12 TAB | Refills: 2 | Status: SHIPPED | OUTPATIENT
Start: 2021-03-15 | End: 2021-12-08 | Stop reason: ALTCHOICE

## 2021-03-16 ENCOUNTER — TELEPHONE (OUTPATIENT)
Dept: INTERNAL MEDICINE CLINIC | Age: 61
End: 2021-03-16

## 2021-03-16 NOTE — TELEPHONE ENCOUNTER
Patient would like a call back with his lab results. He knows that they were sent to [facility] but he does not use it and would like a call instead. His mychart has been deactivated.

## 2021-03-22 ENCOUNTER — TELEPHONE (OUTPATIENT)
Dept: INTERNAL MEDICINE CLINIC | Age: 61
End: 2021-03-22

## 2021-03-24 NOTE — TELEPHONE ENCOUNTER
Spoke with patient and is inquiring regarding permanent DMV placard due to orthopedic restrictions    DMV form completed and will be placed in mail

## 2021-05-17 ENCOUNTER — VIRTUAL VISIT (OUTPATIENT)
Dept: ENDOCRINOLOGY | Age: 61
End: 2021-05-17
Payer: MEDICAID

## 2021-05-17 DIAGNOSIS — E11.9 TYPE 2 DIABETES MELLITUS WITHOUT COMPLICATION, WITHOUT LONG-TERM CURRENT USE OF INSULIN (HCC): Primary | ICD-10-CM

## 2021-05-17 DIAGNOSIS — N28.9 RENAL INSUFFICIENCY: ICD-10-CM

## 2021-05-17 PROCEDURE — 99214 OFFICE O/P EST MOD 30 MIN: CPT | Performed by: INTERNAL MEDICINE

## 2021-05-17 NOTE — PROGRESS NOTES
This is an established visit conducted via telemedicine using doxy,me video  The patient has been instructed that this meets HIPAA criteria ,that they may receive a bill for these services and acknowledges and agrees to this method of visitation.     This is a 78-year-old -American male with a history of type 2 diabetes mellitus x 9 years. I have not seen him since September 2019.     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 Primo Feil have been the major limiting factor to his diabetes management.  When Dr. Steph Astorga saw him last in March his A1c was 13%. Marisol Olguin got him back on metformin and glimepiride and his A1c last was 9.8%.  He apparently ran out of the medicines for several weeks but is back on the medicine now. [facility] was hospitalized at [facility] in 2019 with hyperglycemia and  dehydration.  His creatinine increased to 3.6 but at discharge it was back down to 1. 29.  The most recent is 0.91.     He has recently been followed by the hepatology folks and is completed treatment for hepatitis C. He tells me that he has been taking his medications and Januvia was added to his previous regimen. His major complaint is of his knees which has been debilitating for him for quite some time. He does tell me that he had bilateral steroid injections in his knees with significant improvement in symptoms. Recently, he says his blood sugars did not go up with the steroid injections. Current Diabetes Medications  Metformin 1000 BID  Amaryl 2 mg BID  Januvia 100      His most recent A1c is 6.3%. He checks blood sugars in the morning and generally they range between 100-130. His diet is getting better. He has cut down on the sodas and sweet tea. He does drink orange juice and does admit to eating some candy. Breakfast is an egg sandwich. Lunch can be a sandwich and had been with sweet tea but less frequently.     Last night for dinner he had some fried chicken bread and cabbage. But he is trying to stay away from fried food as much as he can. His girlfriend continues to be major motivator for his health. He denies chest pain, shortness of breath, constipation or diarrhea. Examination  GENERAL: NCAT, Appears well nourished   EYES: EOMI, non-icteric, no proptosis   Ear/Nose/Throat: NCAT, no visible inflammation or masses   CARDIOVASCULAR: no cyanosis, no visible JVD   RESPIRATORY: comfortable respirations observed, no cyanosis   MUSCULOSKELETAL: Normal ROM of upper extremities observed   SKIN: No edema, rash, or other significant changes observed   NEUROLOGIC:  AAOx3   PSYCHIATRIC: Normal affect, Normal insight and judgement       Impression type 2 diabetes mellitus with significantly improved glucose glucose control on Metformin, Amaryl, and Januvia  Plan:  1. No changes in the regimen were made. 2.  I will see him back in 4 months face-to-face.

## 2021-06-21 ENCOUNTER — VIRTUAL VISIT (OUTPATIENT)
Dept: HEMATOLOGY | Age: 61
End: 2021-06-21

## 2021-06-21 DIAGNOSIS — B18.2 CHRONIC HEPATITIS C WITHOUT HEPATIC COMA (HCC): Primary | ICD-10-CM

## 2021-06-21 NOTE — LETTER
NOTIFICATION RETURN TO WORK / SCHOOL    6/21/2021 1:04 PM    Mr. Burnette [facility] [id number]      To Whom It May Concern:    Robert Patel is currently under the care of [address]. He will return to work/school on: ***    If there are questions or concerns please have the patient contact our office. Sincerely,      Gwyn Gonzalez.  Yareli Amin NP

## 2021-06-21 NOTE — PROGRESS NOTES
Identified pt with two pt identifiers(name and ). Reviewed record in preparation for visit and have obtained necessary documentation. Chief Complaint   Patient presents with    Hepatitis C     f/u      There were no vitals filed for this visit. Health Maintenance Review: Patient reminded of \"due or due soon\" health maintenance. I have asked the patient to contact his/her primary care provider (PCP) for follow-up on his/her health maintenance. Coordination of Care Questionnaire:  :   1) Have you been to an emergency room, urgent care, or hospitalized since your last visit? If yes, where when, and reason for visit? no       2. Have seen or consulted any other health care provider since your last visit? If yes, where when, and reason for visit? NO      Patient is accompanied by self I have received verbal consent from Laura Hector to discuss any/all medical information while they are present in the room.

## 2021-07-12 DIAGNOSIS — I10 ESSENTIAL HYPERTENSION: ICD-10-CM

## 2021-07-12 DIAGNOSIS — E11.9 TYPE 2 DIABETES MELLITUS WITHOUT COMPLICATION, WITHOUT LONG-TERM CURRENT USE OF INSULIN (HCC): ICD-10-CM

## 2021-07-12 RX ORDER — SITAGLIPTIN 50 MG/1
TABLET, FILM COATED ORAL
Qty: 30 TABLET | Refills: 0 | Status: SHIPPED | OUTPATIENT
Start: 2021-07-12 | End: 2021-08-17

## 2021-07-12 RX ORDER — LISINOPRIL 2.5 MG/1
TABLET ORAL
Qty: 30 TABLET | Refills: 0 | Status: SHIPPED | OUTPATIENT
Start: 2021-07-12 | End: 2021-08-17

## 2021-07-13 ENCOUNTER — OFFICE VISIT (OUTPATIENT)
Dept: HEMATOLOGY | Age: 61
End: 2021-07-13
Payer: MEDICAID

## 2021-07-13 VITALS
DIASTOLIC BLOOD PRESSURE: 77 MMHG | SYSTOLIC BLOOD PRESSURE: 124 MMHG | HEIGHT: 72 IN | OXYGEN SATURATION: 98 % | WEIGHT: 180 LBS | RESPIRATION RATE: 17 BRPM | TEMPERATURE: 96.9 F | BODY MASS INDEX: 24.38 KG/M2 | HEART RATE: 86 BPM

## 2021-07-13 DIAGNOSIS — Z86.19 HEPATITIS C VIRUS INFECTION CURED AFTER ANTIVIRAL DRUG THERAPY: ICD-10-CM

## 2021-07-13 DIAGNOSIS — B18.2 CHRONIC HEPATITIS C WITHOUT HEPATIC COMA (HCC): Primary | ICD-10-CM

## 2021-07-13 PROBLEM — R76.8 HEPATITIS C ANTIBODY POSITIVE IN BLOOD: Status: ACTIVE | Noted: 2021-07-13

## 2021-07-13 LAB
ALBUMIN SERPL-MCNC: 4.1 G/DL (ref 3.5–5)
ALBUMIN/GLOB SERPL: 1 {RATIO} (ref 1.1–2.2)
ALP SERPL-CCNC: 71 U/L (ref 45–117)
ALT SERPL-CCNC: 31 U/L (ref 12–78)
ANION GAP SERPL CALC-SCNC: 6 MMOL/L (ref 5–15)
AST SERPL-CCNC: 23 U/L (ref 15–37)
BILIRUB DIRECT SERPL-MCNC: 0.1 MG/DL (ref 0–0.2)
BILIRUB SERPL-MCNC: 0.3 MG/DL (ref 0.2–1)
BUN SERPL-MCNC: 17 MG/DL (ref 6–20)
BUN/CREAT SERPL: 20 (ref 12–20)
CALCIUM SERPL-MCNC: 9.8 MG/DL (ref 8.5–10.1)
CHLORIDE SERPL-SCNC: 111 MMOL/L (ref 97–108)
CO2 SERPL-SCNC: 26 MMOL/L (ref 21–32)
CREAT SERPL-MCNC: 0.86 MG/DL (ref 0.7–1.3)
ERYTHROCYTE [DISTWIDTH] IN BLOOD BY AUTOMATED COUNT: 12.9 % (ref 11.5–14.5)
GLOBULIN SER CALC-MCNC: 4 G/DL (ref 2–4)
GLUCOSE SERPL-MCNC: 91 MG/DL (ref 65–100)
HCT VFR BLD AUTO: 43.4 % (ref 36.6–50.3)
HGB BLD-MCNC: 13.7 G/DL (ref 12.1–17)
INR PPP: 1 (ref 0.9–1.1)
MCH RBC QN AUTO: 27.8 PG (ref 26–34)
MCHC RBC AUTO-ENTMCNC: 31.6 G/DL (ref 30–36.5)
MCV RBC AUTO: 88 FL (ref 80–99)
NRBC # BLD: 0 K/UL (ref 0–0.01)
NRBC BLD-RTO: 0 PER 100 WBC
PLATELET # BLD AUTO: 212 K/UL (ref 150–400)
PMV BLD AUTO: 11 FL (ref 8.9–12.9)
POTASSIUM SERPL-SCNC: 3.8 MMOL/L (ref 3.5–5.1)
PROT SERPL-MCNC: 8.1 G/DL (ref 6.4–8.2)
PROTHROMBIN TIME: 10.5 SEC (ref 9–11.1)
RBC # BLD AUTO: 4.93 M/UL (ref 4.1–5.7)
SODIUM SERPL-SCNC: 143 MMOL/L (ref 136–145)
WBC # BLD AUTO: 6.6 K/UL (ref 4.1–11.1)

## 2021-07-13 PROCEDURE — 99213 OFFICE O/P EST LOW 20 MIN: CPT | Performed by: NURSE PRACTITIONER

## 2021-07-13 NOTE — PROGRESS NOTES
Identified pt with two pt identifiers(name and ). Reviewed record in preparation for visit and have obtained necessary documentation. Chief Complaint   Patient presents with    Hepatitis C     f/u      Vitals:    21 1548   BP: 124/77   Pulse: 86   Resp: 17   Temp: 96.9 °F (36.1 °C)   TempSrc: Temporal   SpO2: 98%   Weight: 180 lb (81.6 kg)   Height: 6' (1.829 m)   PainSc:   0 - No pain       Health Maintenance Review: Patient reminded of \"due or due soon\" health maintenance. I have asked the patient to contact his/her primary care provider (PCP) for follow-up on his/her health maintenance. Coordination of Care Questionnaire:  :   1) Have you been to an emergency room, urgent care, or hospitalized since your last visit? If yes, where when, and reason for visit? no       2. Have seen or consulted any other health care provider since your last visit? If yes, where when, and reason for visit? NO      Patient is accompanied by self I have received verbal consent from Elyse Queen to discuss any/all medical information while they are present in the room.

## 2021-07-13 NOTE — PROGRESS NOTES
Raleigh Vega MD, MD Reese Richter PA-C Lari Pal, [facility]-BC     Sindy Martinezworth, [facility]   Chase Tavarez [facility]       Jessica Goldsmith [address]    at [address], [address] [facility] Magdalena Bailey  22.    [phone]    FAX: [address] [place] [address], [address]    [phone]    FAX: [phone]     Patient Care Team:  Junior De Jesus MD as PCP - General (Internal Medicine)  Junior De Jesus MD as PCP - [facility]  Hernandez Rios MD as Consulting Provider (Endocrinology)    Patient Active Problem List   Diagnosis Code    Eczema L30.9    Erectile dysfunction N52.9    Type 2 diabetes with nephropathy ([facility] E11.21    Dehydration E86.0    ASHLEY (acute kidney injury) ([facility] N17.9    Tobacco abuse Z72.0    ETOH abuse F10.10    Chronic hepatitis C without hepatic coma ([facility] B18.2     Destin Painter is being seen at [address] for management of chronic HCV. The active problem list, all pertinent past medical history, medications, liver histology, radiologic findings and laboratory findings related to the liver disorder were reviewed with the patient. The patient has completed 8 weeks of therapy with Mavyret Sept 2020. He has achieved SVR. The patient has no symptoms which can be attributed to the liver disorder. The patient has not experienced the following symptoms: arthralgias. The patient has mild limitations in functional activities which can be attributed to other medical problems not related to the liver disease. ASSESSMENT AND PLAN:  Chronic HCV   Chronic HCV with F1 fibrosis.      Liver transaminases are normal. ALP is normal. Liver function is normal. Total bilirubin is normal. Serum albumin is normal. The platelet count is normal.     Will perform laboratory testing to monitor liver function and degree of liver injury. This included BMP, hepatic panel and CBC with platelet count. I will recheck his FibroSure today. Chronic HCV treatment  The patient completed 8 weeks of Mavyret in September 2020. He has achieved SVR. I will recheck his viral load one more time today and then send a letter to him detailing his most recent labs and HCV follow up/antibody testing. He would prefer to come  this letter when it is ready. Screening for hepatocellular carcinoma  HCC screening is not necessary if the patient has no evidence of cirrhosis. Treatment of other medical problems in patients with chronic liver disease  There are no contraindications for the patient to take most medications necessary for treatment of other medical issues. The patient can take any medications utilized for treatment of DM and/or statins to treat hypercholesterolemia. The patient does not consume alcohol on a daily basis. Normal doses of acetaminophen, as recommended on the label of the bottle, are not hepatotoxic except in the setting of daily alcohol use. Even patients with cirrhosis can utilize acetaminophen for pain. Counseling for alcohol in patients with chronic liver disease  The patient was counseled regarding alcohol consumption and the effect of alcohol on chronic liver disease. The patient was reminded alcohol can cause fatty liver. Vaccinations   Recommend vaccination against viral hepatitis B. He has documented immunity against viral hepatitis A. Routine vaccinations against other bacterial and viral agents can be performed as indicated. Annual flu vaccination should be administered if indicated.     ALLERGIES  No Known Allergies    Current Outpatient Medications on File Prior to Visit   Medication Sig Dispense Refill    Januvia 50 mg tablet Take 1 tablet by mouth once daily 30 Tablet 0    lisinopriL (PRINIVIL, ZESTRIL) 2.5 mg tablet Take 1 tablet by mouth once daily 30 Tablet 0    sildenafiL, pulmonary hypertension, (REVATIO) 20 mg tablet 2 tabs every day prn for erectile dysfunction 12 Tab 2    metFORMIN (GLUCOPHAGE) 1,000 mg tablet Take 1 Tab by mouth two (2) times daily (with meals). 60 Tab 6    Blood-Glucose Meter (ReliOn All-In-One Meter) monitoring kit Check BG 1-2 times/day 1 Kit 0    glucose blood VI test strips (blood glucose test) strip Check BG 1-2 times/day 100 Strip 11    lancets misc Check BS  Each 11    glimepiride (AMARYL) 2 mg tablet Take 1 Tab by mouth two (2) times a day. 60 Tab 6    [DISCONTINUED] SITagliptin (Januvia) 50 mg tablet Take 1 Tab by mouth daily. 30 Tab 3    [DISCONTINUED] lisinopriL (PRINIVIL, ZESTRIL) 2.5 mg tablet Take 1 Tab by mouth daily. 30 Tab 3    acetaminophen (TYLENOL) 325 mg tablet Take 2 Tabs by mouth three (3) times daily as needed for Pain. 60 Tab 2    folic acid (FOLVITE) 1 mg tablet Take 1 Tab by mouth daily. 30 Tab 0    multivitamin, tx-iron-ca-min (THERA-M W/ IRON) 9 mg iron-400 mcg tab tablet Take 1 Tab by mouth daily. 30 Tab 0    thiamine mononitrate (B-1) 100 mg tablet Take 1 Tab by mouth daily. 30 Tab 0     No current facility-administered medications on file prior to visit. FAMILY HISTORY:  The father  from unknown cause while the patient was in prison. The mother is alive with hypertension and dementia. There is no family history of liver disease. His brother  from [facility]. There is no family history of immune disorders. SOCIAL HISTORY:  The patient has a girlfriend. The patient has 5 children and lots of grandchildren. The patient smokes 1/2 PPD. The patient drinks 2-3 beers about 3 times a week. The patient collects SSI due to arthritis.      PHYSICAL EXAMINATION:  Visit Vitals  /77 (BP 1 Location: Right upper arm, BP Patient Position: Sitting, BP Cuff Size: Adult)   Pulse 86   Temp 96.9 °F (36.1 °C) (Temporal)   Resp 17   Ht 6' (1.829 m)   Wt 180 lb (81.6 kg)   SpO2 98%   BMI 24.41 kg/m²     General: No acute distress. Eyes: Sclera anicteric. ENT: No oral lesions. Nodes: No adenopathy. Skin: No spider angiomata. No jaundice. No palmar erythema. Respiratory: Lungs clear to auscultation. Cardiovascular: Regular heart rate. No murmurs. No JVD. Abdomen: Soft non-tender, liver size normal to percussion/palpation. Spleen not palpable. No obvious ascites. Extremities: No edema. No muscle wasting. No gross arthritic changes. Neurologic: Alert and oriented. Cranial nerves grossly intact. No asterixis.     LABORATORY STUDIES:  Liver [facility] [address] 3/8/2021 2/1/2021 10/12/2020   WBC 3.4 - 10.8 x10E3/uL  4.9 6.1   ANC 1.8 - 8.0 K/UL      HGB 13.0 - 17.7 g/dL  13.9 13.1    - 450 x10E3/uL  201 235   INR 0.8 - 1.2      AST 0 - 40 IU/L 20 18 14   ALT 0 - 44 IU/L 15 14 12   Alk Phos 39 - 117 IU/L 74 69 79   Bili, Total 0.0 - 1.2 mg/dL <0.2 0.3 <0.2   Bili, Direct 0.00 - 0.40 mg/dL  0.09 0.06   Albumin 3.8 - 4.8 g/dL 4.3 4.2 4.5   BUN 8 - 27 mg/dL 12 12 10   BUN (iSTAT) 9 - 20 mg/dL      Creat 0.76 - 1.27 mg/dL 0.85 0.81 0.91   Creat (iSTAT) 0.6 - 1.3 mg/dL      Na 134 - 144 mmol/L 138 141 139   K 3.5 - 5.2 mmol/L 4.0 4.3 4.0   Cl 96 - 106 mmol/L 103 101 104   CO2 20 - 29 mmol/L 20 26 23   Glucose 65 - 99 mg/dL 86 82 107 (H)   Magnesium 1.6 - 2.4 mg/dL        SEROLOGIES:  Virology Latest Ref Rng & Units 2/1/2021 10/12/2020 7/16/2020   HCV RNA (IU) IU/mL      HCV RNA, DAISY, QL Negative Negative Negative Positive (A)     Virology Latest Ref Rng & Units 2/27/2020   HCV RNA (IU) IU/mL 14,000,000   HCV RNA, DAISY, QL Negative Positive (A)     Serologies Latest Ref Rng & Units 2/27/2020 9/6/2019   Hep A Ab, Total Negative Positive (A)    Hep B Surface Ag Negative Negative    Hep B Surface Ag Index     Hep B Surface Ag Interp NEG       Hep B Core Ab, Total Negative Negative    Hep B Surface AB QL  Non Reactive    Hep C Ab NR       Hep C Genotype  1a    HCV RT-PCR, Quant IU/mL See Final Results    C-ANCA Neg:<1:20 titer  <1:20   P-ANCA Neg:<1:20 titer  <1:20   ANCA Neg:<1:20 titer  <1:20     Serologies Latest Ref Rng & Units 9/5/2019   Hep A Ab, Total Negative    Hep B Surface Ag Negative    Hep B Surface Ag Index <0.10   Hep B Surface Ag Interp NEG   NEGATIVE   Hep B Core Ab, Total Negative    Hep B Surface AB QL     Hep C Ab NR   REACTIVE (A)   Hep C Genotype     HCV RT-PCR, Quant IU/mL    C-ANCA Neg:<1:20 titer    P-ANCA Neg:<1:20 titer    ANCA Neg:<1:20 titer      11/2019. HCV RNA qual positive. LIVER HISTOLOGY:  2/2020. FibroSure. F1    ENDOSCOPIC PROCEDURES:  Not available or performed    RADIOLOGY:  3/2020. Abdominal ultrasound. Mildly echogenic and heterogeneous liver can be seen with hepatic steatosis or nonspecific parenchymal liver disease. No focal liver mass. OTHER TESTING:  Not available or performed    FOLLOW-UP:  All of the issues listed above in the assessment and plan were discussed with the patient. All questions were answered. The patient expressed a clear understanding of the above. He requires no long term follow up with a liver provider now that his HCV is cured.      Carlos Koroma, [facility]  Liver [place] [facility] [address] [place], [address] [place]  [address] [facility] [address].  [phone]

## 2021-07-15 LAB
A2 MACROGLOB SERPL-MCNC: 278 MG/DL (ref 110–276)
ALT (SGPT) P5P, 001547: 26 IU/L (ref 0–55)
APO A-I SERPL-MCNC: 170 MG/DL (ref 101–178)
BILIRUB SERPL-MCNC: 0.2 MG/DL (ref 0–1.2)
COMMENT, 550127: ABNORMAL
FIBROSIS SCORE, 550102, HCVF1: 0.17 (ref 0–0.21)
FIBROSIS SCORING:, 550107: ABNORMAL
FIBROSIS STAGE, 550132: ABNORMAL
GGT SERPL-CCNC: 25 IU/L (ref 0–65)
HAPTOGLOB SERPL-MCNC: 217 MG/DL (ref 32–363)
HCV RNA SERPL QL NAA+PROBE: NEGATIVE
INTERPRETATION, 550106: ABNORMAL
LIMITATIONS, 550105: ABNORMAL
NECROINFLAMM ACTIVITY SCORING:, 550121: ABNORMAL
NECROINFLAMMAT ACTIVITY GRADE, 550133: ABNORMAL
NECROINFLAMMAT ACTIVITY SCORE, 550103: 0.1 (ref 0–0.17)
SERIAL MONITORING: NORMAL

## 2021-07-20 ENCOUNTER — TELEPHONE (OUTPATIENT)
Dept: HEMATOLOGY | Age: 61
End: 2021-07-20

## 2021-07-20 NOTE — TELEPHONE ENCOUNTER
Two pt identifiers confirmed. Called patient to let him know his results letter is ready to . He asked me to read it and expessed his gratitude to Brianna Lira NP. He will  the letter some time this week. Patient verbalized understanding of information discussed w/ no further questions at this time.

## 2021-08-16 DIAGNOSIS — I10 ESSENTIAL HYPERTENSION: ICD-10-CM

## 2021-08-16 DIAGNOSIS — E11.9 TYPE 2 DIABETES MELLITUS WITHOUT COMPLICATION, WITHOUT LONG-TERM CURRENT USE OF INSULIN (HCC): ICD-10-CM

## 2021-08-17 RX ORDER — LISINOPRIL 2.5 MG/1
TABLET ORAL
Qty: 30 TABLET | Refills: 0 | Status: SHIPPED | OUTPATIENT
Start: 2021-08-17 | End: 2021-09-27 | Stop reason: SDUPTHER

## 2021-08-17 RX ORDER — SITAGLIPTIN 50 MG/1
TABLET, FILM COATED ORAL
Qty: 30 TABLET | Refills: 0 | Status: SHIPPED | OUTPATIENT
Start: 2021-08-17 | End: 2021-09-27 | Stop reason: SDUPTHER

## 2021-09-27 ENCOUNTER — OFFICE VISIT (OUTPATIENT)
Dept: INTERNAL MEDICINE CLINIC | Age: 61
End: 2021-09-27
Payer: MEDICAID

## 2021-09-27 VITALS
OXYGEN SATURATION: 99 % | RESPIRATION RATE: 16 BRPM | HEIGHT: 72 IN | DIASTOLIC BLOOD PRESSURE: 80 MMHG | SYSTOLIC BLOOD PRESSURE: 142 MMHG | HEART RATE: 87 BPM | WEIGHT: 188 LBS | TEMPERATURE: 98 F | BODY MASS INDEX: 25.47 KG/M2

## 2021-09-27 DIAGNOSIS — E11.9 TYPE 2 DIABETES MELLITUS WITHOUT COMPLICATION, WITHOUT LONG-TERM CURRENT USE OF INSULIN (HCC): Primary | ICD-10-CM

## 2021-09-27 DIAGNOSIS — I10 ESSENTIAL HYPERTENSION: ICD-10-CM

## 2021-09-27 DIAGNOSIS — Z23 ENCOUNTER FOR IMMUNIZATION: ICD-10-CM

## 2021-09-27 DIAGNOSIS — E29.1 HYPOGONADISM MALE: ICD-10-CM

## 2021-09-27 PROCEDURE — 99214 OFFICE O/P EST MOD 30 MIN: CPT | Performed by: INTERNAL MEDICINE

## 2021-09-27 PROCEDURE — 90686 IIV4 VACC NO PRSV 0.5 ML IM: CPT | Performed by: INTERNAL MEDICINE

## 2021-09-27 RX ORDER — LANCETS
EACH MISCELLANEOUS
Qty: 100 EACH | Refills: 11 | Status: SHIPPED | OUTPATIENT
Start: 2021-09-27

## 2021-09-27 RX ORDER — GLIMEPIRIDE 2 MG/1
2 TABLET ORAL 2 TIMES DAILY
Qty: 60 TABLET | Refills: 6 | Status: SHIPPED | OUTPATIENT
Start: 2021-09-27 | End: 2021-10-20

## 2021-09-27 RX ORDER — LISINOPRIL 2.5 MG/1
2.5 TABLET ORAL DAILY
Qty: 90 TABLET | Refills: 1 | Status: SHIPPED | OUTPATIENT
Start: 2021-09-27 | End: 2022-01-06 | Stop reason: SDUPTHER

## 2021-09-27 RX ORDER — INSULIN PUMP SYRINGE, 3 ML
EACH MISCELLANEOUS
Qty: 1 KIT | Refills: 0 | Status: SHIPPED | OUTPATIENT
Start: 2021-09-27

## 2021-09-27 RX ORDER — IBUPROFEN 200 MG
CAPSULE ORAL
Qty: 100 STRIP | Refills: 11 | Status: SHIPPED | OUTPATIENT
Start: 2021-09-27

## 2021-09-27 NOTE — PROGRESS NOTES
HISTORY OF PRESENT ILLNESS  Sindi Brenner is a 59 y.o. male here for follow-up. Has diabetes, seeing endocrinologist.  Watching diet. Need refill on medications. Need foot exam and eye checkup. Has hypertension, blood pressure seems stable. No chest pain palpitation or shortness of breath. Report erectile dysfunction. Cialis is not helping him. Lab work done in the past, showed low testosterone level. All labs reviewed, imaging reviewed. Medications are reconciled. Need flu shot. Diabetes    Erectile Dysfunction    Knee Pain    Hypertension     Medication Refill    Back Pain         Review of Systems   Constitutional: Negative. HENT: Negative. Eyes: Negative. Respiratory: Negative. Cardiovascular: Negative. Gastrointestinal: Negative. Genitourinary: Negative. Musculoskeletal: Positive for back pain and joint pain. Skin: Negative. Neurological: Negative. Endo/Heme/Allergies: Negative. Psychiatric/Behavioral: Negative. Physical Exam  Constitutional:       General: He is not in acute distress. Appearance: Normal appearance. He is well-developed. Neck:      Thyroid: No thyromegaly. Vascular: No JVD. Cardiovascular:      Rate and Rhythm: Normal rate and regular rhythm. Pulses: Normal pulses. Heart sounds: Normal heart sounds. Pulmonary:      Effort: Pulmonary effort is normal. No respiratory distress. Breath sounds: Normal breath sounds. No wheezing. Musculoskeletal:         General: Tenderness present. Cervical back: Normal range of motion and neck supple. Comments:      Neurological:      Mental Status: He is alert. Psychiatric:         Mood and Affect: Mood normal.         Behavior: Behavior normal.         ASSESSMENT and PLAN  Diagnoses and all orders for this visit:    1. Type 2 diabetes mellitus without complication, without long-term current use of insulin (HCC)    Stable diabetes.   Will refill,  -     lisinopriL (PRINIVIL, ZESTRIL) 2.5 mg tablet; Take 1 Tablet by mouth daily.  -     SITagliptin (Januvia) 50 mg tablet; Take 1 Tablet by mouth daily.  -     glimepiride (AMARYL) 2 mg tablet; Take 1 Tablet by mouth two (2) times a day. -     glucose blood VI test strips (blood glucose test) strip; Check BG 1-2 times/day  -     Blood-Glucose Meter (ReliOn All-In-One Meter) monitoring kit; Check BG 1-2 times/day  -     lancets misc; Check BS BID  -     HEMOGLOBIN A1C WITH EAG  -     METABOLIC PANEL, COMPREHENSIVE    2. Essential hypertension    We will refill,  -     lisinopriL (PRINIVIL, ZESTRIL) 2.5 mg tablet; Take 1 Tablet by mouth daily. 3. Encounter for immunization  -     INFLUENZA VIRUS VAC QUAD,SPLIT,PRESV FREE SYRINGE IM    4. Hypogonadism male    Cialis is not helping him. Had low testosterone. Need a testosterone injection. Will refer,  -     REFERRAL TO UROLOGY              Issues reviewed with her: referral to Diabetic Education department, diabetic diet discussed in detail, written exchange diet given, home glucose monitoring emphasized, all medications, side effects and compliance discussed carefully, foot care discussed and Podiatry visits discussed, annual eye examinations at Ophthalmology discussed, long term diabetic complications discussed and labs immediately prior to next visit.

## 2021-09-27 NOTE — PROGRESS NOTES
Health Maintenance Due   Topic Date Due    Eye Exam Retinal or Dilated  Never done    DTaP/Tdap/Td series (1 - Tdap) Never done    Shingrix Vaccine Age 50> (1 of 2) Never done    Pneumococcal 0-64 years (1 of 2 - PPSV23) 11/06/2019    Flu Vaccine (1) 09/01/2021       Chief Complaint   Patient presents with    Discuss Medications    Back Pain    Other     f/u       1. Have you been to the ER, urgent care clinic since your last visit? Hospitalized since your last visit? No    2. Have you seen or consulted any other health care providers outside of the [address] [place] [address] since your last visit? Include any pap smears or colon screening. No    3) Do you have an Advance Directive on file? no    4) Are you interested in receiving information on Advance Directives? NO      Patient is accompanied by self I have received verbal consent from Whit Pelayo to discuss any/all medical information while they are present in the room.

## 2021-09-29 LAB
ALBUMIN SERPL-MCNC: 4.5 G/DL (ref 3.8–4.8)
ALBUMIN/GLOB SERPL: 1.5 {RATIO} (ref 1.2–2.2)
ALP SERPL-CCNC: 77 IU/L (ref 44–121)
ALT SERPL-CCNC: 16 IU/L (ref 0–44)
AST SERPL-CCNC: 17 IU/L (ref 0–40)
BILIRUB SERPL-MCNC: <0.2 MG/DL (ref 0–1.2)
BUN SERPL-MCNC: 15 MG/DL (ref 8–27)
BUN/CREAT SERPL: 20 (ref 10–24)
CALCIUM SERPL-MCNC: 9.5 MG/DL (ref 8.6–10.2)
CHLORIDE SERPL-SCNC: 103 MMOL/L (ref 96–106)
CO2 SERPL-SCNC: 24 MMOL/L (ref 20–29)
CREAT SERPL-MCNC: 0.76 MG/DL (ref 0.76–1.27)
EST. AVERAGE GLUCOSE BLD GHB EST-MCNC: 123 MG/DL
GLOBULIN SER CALC-MCNC: 3.1 G/DL (ref 1.5–4.5)
GLUCOSE SERPL-MCNC: 145 MG/DL (ref 65–99)
HBA1C MFR BLD: 5.9 % (ref 4.8–5.6)
POTASSIUM SERPL-SCNC: 4.2 MMOL/L (ref 3.5–5.2)
PROT SERPL-MCNC: 7.6 G/DL (ref 6–8.5)
SODIUM SERPL-SCNC: 140 MMOL/L (ref 134–144)

## 2021-10-19 DIAGNOSIS — E11.9 TYPE 2 DIABETES MELLITUS WITHOUT COMPLICATION, WITHOUT LONG-TERM CURRENT USE OF INSULIN (HCC): ICD-10-CM

## 2021-10-20 RX ORDER — GLIMEPIRIDE 2 MG/1
TABLET ORAL
Qty: 180 TABLET | Refills: 0 | Status: SHIPPED | OUTPATIENT
Start: 2021-10-20 | End: 2022-06-21

## 2021-12-06 ENCOUNTER — TELEPHONE (OUTPATIENT)
Dept: INTERNAL MEDICINE CLINIC | Age: 61
End: 2021-12-06

## 2021-12-06 NOTE — TELEPHONE ENCOUNTER
Patient would like a call back to discuss getting some type of disability. Please call him back at [phone]

## 2021-12-06 NOTE — TELEPHONE ENCOUNTER
Call placed to pt, HE is asking for provider to complete disability forms out for him. Appt was made for provider to exam and discuss with pt.

## 2021-12-08 ENCOUNTER — OFFICE VISIT (OUTPATIENT)
Dept: INTERNAL MEDICINE CLINIC | Age: 61
End: 2021-12-08
Payer: MEDICAID

## 2021-12-08 VITALS
TEMPERATURE: 99 F | SYSTOLIC BLOOD PRESSURE: 136 MMHG | DIASTOLIC BLOOD PRESSURE: 82 MMHG | WEIGHT: 189 LBS | HEIGHT: 72 IN | HEART RATE: 77 BPM | RESPIRATION RATE: 20 BRPM | BODY MASS INDEX: 25.6 KG/M2 | OXYGEN SATURATION: 98 %

## 2021-12-08 DIAGNOSIS — I10 ESSENTIAL HYPERTENSION: ICD-10-CM

## 2021-12-08 DIAGNOSIS — M17.0 OSTEOARTHRITIS OF BOTH KNEES, UNSPECIFIED OSTEOARTHRITIS TYPE: ICD-10-CM

## 2021-12-08 DIAGNOSIS — E29.1 HYPOGONADISM MALE: ICD-10-CM

## 2021-12-08 DIAGNOSIS — E11.9 TYPE 2 DIABETES MELLITUS WITHOUT COMPLICATION, WITHOUT LONG-TERM CURRENT USE OF INSULIN (HCC): Primary | ICD-10-CM

## 2021-12-08 PROCEDURE — 99214 OFFICE O/P EST MOD 30 MIN: CPT | Performed by: INTERNAL MEDICINE

## 2021-12-08 NOTE — PROGRESS NOTES
HISTORY OF PRESENT ILLNESS  Kodi Oviedo is a 59 y.o. male here for follow-up. He is on rocking for long time. 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 He has multilevel DJD in the neck knee and shoulder. Not able to work full-time. Knee is bothering him a lot. Seeing orthopedic. Received an injection. Has diabetes, seeing endocrinologist.  Watching diet. Need refill on medications. Need foot exam and eye checkup. Has hypertension, blood pressure seems stable. No chest pain palpitation or shortness of breath. All labs reviewed, imaging reviewed. Medications are reconciled. Need lab work. Refused Covid vaccine. Diabetes    Erectile Dysfunction    Knee Pain    Hypertension     Medication Refill    Joint Pain         Review of Systems   Constitutional: Negative. HENT: Negative. Eyes: Negative. Respiratory: Negative. Cardiovascular: Negative. Gastrointestinal: Negative. Genitourinary: Negative. Musculoskeletal: Positive for joint pain. Skin: Negative. Neurological: Negative. Endo/Heme/Allergies: Negative. Psychiatric/Behavioral: Negative. Physical Exam  Constitutional:       General: He is not in acute distress. Appearance: Normal appearance. He is well-developed. Neck:      Thyroid: No thyromegaly. Vascular: No JVD. Cardiovascular:      Rate and Rhythm: Normal rate and regular rhythm. Pulses: Normal pulses. Heart sounds: Normal heart sounds. Pulmonary:      Effort: Pulmonary effort is normal. No respiratory distress. Breath sounds: Normal breath sounds. No wheezing. Musculoskeletal:         General: Tenderness present. Cervical back: Normal range of motion and neck supple. Neurological:      Mental Status: He is alert. Psychiatric:         Mood and Affect: Mood normal.         Behavior: Behavior normal.         ASSESSMENT and PLAN  Diagnoses and all orders for this visit:    1.  Type 2 diabetes mellitus without complication, without long-term current use of insulin (HCC)    Stable blood pressure. On Metformin, Januvia, glimepiride. Will check,  -     METABOLIC PANEL, COMPREHENSIVE  -     MICROALBUMIN, UR, RAND W/ MICROALB/CREAT RATIO  -     HEMOGLOBIN A1C WITH EAG    2. Essential hypertension    Stable blood pressure. On lisinopril. Will check,  -     METABOLIC PANEL, COMPREHENSIVE    3. Hypogonadism male    4. Osteoarthritis of both knees, unspecified osteoarthritis type  Probable DJD. Taking Tylenol as needed. Discussed expected course/resolution/complications of diagnosis in detail with patient. Medication risks/benefits/costs/interactions/alternatives discussed with patient. Discussed COVID-19 infection precaution with patient. Pt was given an after visit summary which includes diagnoses, current medications & vitals. Pt expressed understanding with the diagnosis and plan.

## 2021-12-08 NOTE — PROGRESS NOTES
Health Maintenance Due   Topic Date Due    Eye Exam Retinal or Dilated  Never done    DTaP/Tdap/Td series (1 - Tdap) Never done    Shingrix Vaccine Age 50> (1 of 2) Never done    Pneumococcal 0-64 years (1 of 2 - PPSV23) 11/06/2019    COVID-19 Vaccine (2 - Moderna 3-dose booster series) 06/20/2021       Chief Complaint   Patient presents with    Diabetes    Joint Pain       1. Have you been to the ER, urgent care clinic since your last visit? Hospitalized since your last visit? No    2. Have you seen or consulted any other health care providers outside of the [address] [place] [address] since your last visit? Include any pap smears or colon screening. No    3) Do you have an Advance Directive on file? no    4) Are you interested in receiving information on Advance Directives? NO      Patient is accompanied by self I have received verbal consent from [place] aVldes to discuss any/all medical information while they are present in the room.

## 2022-01-06 ENCOUNTER — TELEPHONE (OUTPATIENT)
Dept: INTERNAL MEDICINE CLINIC | Age: 62
End: 2022-01-06

## 2022-01-06 ENCOUNTER — OFFICE VISIT (OUTPATIENT)
Dept: INTERNAL MEDICINE CLINIC | Age: 62
End: 2022-01-06
Payer: MEDICAID

## 2022-01-06 ENCOUNTER — DOCUMENTATION ONLY (OUTPATIENT)
Dept: INTERNAL MEDICINE CLINIC | Age: 62
End: 2022-01-06

## 2022-01-06 VITALS
SYSTOLIC BLOOD PRESSURE: 142 MMHG | WEIGHT: 189 LBS | HEART RATE: 79 BPM | DIASTOLIC BLOOD PRESSURE: 84 MMHG | BODY MASS INDEX: 25.6 KG/M2 | OXYGEN SATURATION: 98 % | TEMPERATURE: 98 F | HEIGHT: 72 IN | RESPIRATION RATE: 20 BRPM

## 2022-01-06 DIAGNOSIS — I10 ESSENTIAL HYPERTENSION: ICD-10-CM

## 2022-01-06 DIAGNOSIS — M17.11 PRIMARY OSTEOARTHRITIS OF RIGHT KNEE: Primary | ICD-10-CM

## 2022-01-06 DIAGNOSIS — E11.9 TYPE 2 DIABETES MELLITUS WITHOUT COMPLICATION, WITHOUT LONG-TERM CURRENT USE OF INSULIN (HCC): ICD-10-CM

## 2022-01-06 DIAGNOSIS — Z12.5 SCREENING FOR PROSTATE CANCER: ICD-10-CM

## 2022-01-06 PROCEDURE — 99214 OFFICE O/P EST MOD 30 MIN: CPT | Performed by: INTERNAL MEDICINE

## 2022-01-06 RX ORDER — LISINOPRIL 2.5 MG/1
2.5 TABLET ORAL DAILY
Qty: 90 TABLET | Refills: 1 | Status: SHIPPED | OUTPATIENT
Start: 2022-01-06 | End: 2022-05-18 | Stop reason: SDUPTHER

## 2022-01-06 RX ORDER — TRAMADOL HYDROCHLORIDE 50 MG/1
50 TABLET ORAL
Qty: 30 TABLET | Refills: 0 | Status: SHIPPED | OUTPATIENT
Start: 2022-01-06 | End: 2022-02-05

## 2022-01-06 RX ORDER — DICLOFENAC SODIUM 10 MG/G
GEL TOPICAL
Qty: 100 G | Refills: 3 | Status: SHIPPED | OUTPATIENT
Start: 2022-01-06

## 2022-01-06 RX ORDER — METFORMIN HYDROCHLORIDE 1000 MG/1
1000 TABLET ORAL 2 TIMES DAILY WITH MEALS
Qty: 60 TABLET | Refills: 6 | Status: SHIPPED | OUTPATIENT
Start: 2022-01-06 | End: 2022-10-04 | Stop reason: SDUPTHER

## 2022-01-06 NOTE — PROGRESS NOTES
Health Maintenance Due   Topic Date Due    Eye Exam Retinal or Dilated  Never done    DTaP/Tdap/Td series (1 - Tdap) Never done    Shingrix Vaccine Age 50> (1 of 2) Never done    Pneumococcal 0-64 years (1 of 2 - PPSV23) 11/06/2019    COVID-19 Vaccine (3 - Booster) 11/23/2021       Chief Complaint   Patient presents with    Annual Wellness Visit    Knee Pain    Back Pain    Medication Refill       1. Have you been to the ER, urgent care clinic since your last visit? Hospitalized since your last visit? No    2. Have you seen or consulted any other health care providers outside of the [address] [place] [address] since your last visit? Include any pap smears or colon screening. No    3) Do you have an Advance Directive on file? no    4) Are you interested in receiving information on Advance Directives? NO      Patient is accompanied by self I have received verbal consent from Joy Lopez to discuss any/all medical information while they are present in the room.

## 2022-01-06 NOTE — PROGRESS NOTES
Chief Complaint   Patient presents with    Prior Auth     traMADol HCl 50MG tablets          Sent to [address]: [facility] - Rx #: [id number]

## 2022-01-06 NOTE — PROGRESS NOTES
HISTORY OF PRESENT ILLNESS  Meche Zapata is a 59 y.o. male here for follow-up. Reported right knee pain in moderate intensity for last several months. And he is not able to walk well. Has seen orthopedic surgeon Dr. Babita Colin, was advised to replace his right knee. He is not able to sleep at all at night due to pain. Would like to get pain medication. Has hypertension, blood pressure seems stable. No chest pain palpitation or shortness of breath. Has diabetes, on Metformin. Eye checkup up-to-date. Labs are stable. All labs reviewed, imaging reviewed. Medications are reconciled. Blood pressure checkup. Diabetes    Knee Pain    Hypertension     Medication Refill        Review of Systems   Constitutional: Negative. HENT: Negative. Eyes: Negative. Respiratory: Negative. Cardiovascular: Negative. Gastrointestinal: Negative. Genitourinary: Negative. Musculoskeletal: Positive for back pain and joint pain. Skin: Negative. Neurological: Negative. Endo/Heme/Allergies: Negative. Psychiatric/Behavioral: Negative. Physical Exam  Constitutional:       General: He is not in acute distress. Appearance: Normal appearance. He is well-developed. Neck:      Thyroid: No thyromegaly. Vascular: No JVD. Cardiovascular:      Rate and Rhythm: Normal rate and regular rhythm. Pulses: Normal pulses. Heart sounds: Normal heart sounds. Pulmonary:      Effort: Pulmonary effort is normal. No respiratory distress. Breath sounds: Normal breath sounds. No wheezing. Musculoskeletal:         General: Tenderness present. Cervical back: Normal range of motion and neck supple. Comments:   Right knee: Tenderness on the right knee./Told him present. Range of motion restricted. Neurological:      Mental Status: He is alert.    Psychiatric:         Mood and Affect: Mood normal.         Behavior: Behavior normal.         ASSESSMENT and PLAN  Diagnoses and all orders for this visit:    1. Primary osteoarthritis of right knee    Seeing orthopedic Dr. Óscar Arroyo.  He is advised to have knee replacement done. And has moderate knee pain. He is not able to ambulate well. He has bowed knee. Will give,  -     traMADoL (ULTRAM) 50 mg tablet; Take 1 Tablet by mouth daily as needed for Pain for up to 30 days. #30, no refill.  -     diclofenac (VOLTAREN) 1 % gel; Apply  to affected area two (2) times daily as needed for Pain. 2. Type 2 diabetes mellitus without complication, without long-term current use of insulin (HCC)  -     lisinopriL (PRINIVIL, ZESTRIL) 2.5 mg tablet; Take 1 Tablet by mouth daily. -     metFORMIN (GLUCOPHAGE) 1,000 mg tablet; Take 1 Tablet by mouth two (2) times daily (with meals). -     METABOLIC PANEL, COMPREHENSIVE  -     MICROALBUMIN, UR, RAND W/ MICROALB/CREAT RATIO  -     HEMOGLOBIN A1C WITH EAG    3. Essential hypertension  Slight elevated blood pressure since he ran out of medicine. Will refill,  -     lisinopriL (PRINIVIL, ZESTRIL) 2.5 mg tablet; Take 1 Tablet by mouth daily.  -     METABOLIC PANEL, COMPREHENSIVE    4. Screening for prostate cancer    We will check,  -     PSA SCREENING (SCREENING)    Discussed expected course/resolution/complications of diagnosis in detail with patient. Medication risks/benefits/costs/interactions/alternatives discussed with patient. Discussed COVID-19 infection precaution with patient. Pt was given an after visit summary which includes diagnoses, current medications & vitals. Pt expressed understanding with the diagnosis and plan.

## 2022-01-07 LAB
ALBUMIN SERPL-MCNC: 4.8 G/DL (ref 3.8–4.8)
ALBUMIN/CREAT UR: 7 MG/G CREAT (ref 0–29)
ALBUMIN/GLOB SERPL: 1.4 {RATIO} (ref 1.2–2.2)
ALP SERPL-CCNC: 79 IU/L (ref 44–121)
ALT SERPL-CCNC: 19 IU/L (ref 0–44)
AST SERPL-CCNC: 17 IU/L (ref 0–40)
BILIRUB SERPL-MCNC: 0.2 MG/DL (ref 0–1.2)
BUN SERPL-MCNC: 16 MG/DL (ref 8–27)
BUN/CREAT SERPL: 17 (ref 10–24)
CALCIUM SERPL-MCNC: 10.5 MG/DL (ref 8.6–10.2)
CHLORIDE SERPL-SCNC: 101 MMOL/L (ref 96–106)
CO2 SERPL-SCNC: 23 MMOL/L (ref 20–29)
CREAT SERPL-MCNC: 0.93 MG/DL (ref 0.76–1.27)
CREAT UR-MCNC: 180.7 MG/DL
EST. AVERAGE GLUCOSE BLD GHB EST-MCNC: 148 MG/DL
GLOBULIN SER CALC-MCNC: 3.4 G/DL (ref 1.5–4.5)
GLUCOSE SERPL-MCNC: 135 MG/DL (ref 65–99)
HBA1C MFR BLD: 6.8 % (ref 4.8–5.6)
MICROALBUMIN UR-MCNC: 13.2 UG/ML
POTASSIUM SERPL-SCNC: 4.1 MMOL/L (ref 3.5–5.2)
PROT SERPL-MCNC: 8.2 G/DL (ref 6–8.5)
PSA SERPL-MCNC: 1.5 NG/ML (ref 0–4)
SODIUM SERPL-SCNC: 140 MMOL/L (ref 134–144)

## 2022-01-07 NOTE — PROGRESS NOTES
Calcium mildly elevated. If applicable, reduce calcium supplement. HgbA1c 6.8. Watch diet for foods high in sugar and carbohydrates.     Otherwise, stable labs

## 2022-01-07 NOTE — TELEPHONE ENCOUNTER
Prior authorization was done and insurance sent denial letter. Call placed back to pt and left VM that I will ask provider to order alternate medication.

## 2022-01-09 ENCOUNTER — HOSPITAL ENCOUNTER (EMERGENCY)
Age: 62
Discharge: HOME | End: 2022-01-09
Attending: EMERGENCY MEDICINE
Payer: MEDICAID

## 2022-01-09 VITALS
SYSTOLIC BLOOD PRESSURE: 153 MMHG | OXYGEN SATURATION: 99 % | HEIGHT: 72 IN | TEMPERATURE: 98.4 F | BODY MASS INDEX: 25.06 KG/M2 | DIASTOLIC BLOOD PRESSURE: 92 MMHG | HEART RATE: 76 BPM | WEIGHT: 185 LBS | RESPIRATION RATE: 12 BRPM

## 2022-01-09 DIAGNOSIS — Z72.0 TOBACCO USE: ICD-10-CM

## 2022-01-09 DIAGNOSIS — I10 HYPERTENSION, UNSPECIFIED TYPE: ICD-10-CM

## 2022-01-09 DIAGNOSIS — Z20.822 PERSON UNDER INVESTIGATION FOR COVID-19: Primary | ICD-10-CM

## 2022-01-09 PROCEDURE — U0005 INFEC AGEN DETEC AMPLI PROBE: HCPCS

## 2022-01-09 PROCEDURE — 99282 EMERGENCY DEPT VISIT SF MDM: CPT

## 2022-01-09 NOTE — ED PROVIDER NOTES
EMERGENCY DEPARTMENT HISTORY AND PHYSICAL EXAM    Date: 1/9/2022  Patient Name: Sydna Schaumann    History of Presenting Illness     Chief Complaint   Patient presents with    Cough    Concern For COVID-19 (Coronavirus)         History Provided By: Patient    HPI: Sydna Schaumann is a 59 y.o. male with a PMH of Eczema, arthritis who presents with signs for Covid. Patient reports symptoms began 1 day ago. States he has a cough but contributes that to his smoking history. Patient states he lays down and feels that he has chest congestion and chills. States he initially had pain in his right shoulder radiating to the right upper back that appears to be resolving. Denies chest pain, shortness of breath, wheezing. Denies fever, body aches, loss of appetite or smell. Denies exposure to Covid. States he is fully vaccinated. Reports seeing PCP 2 days prior to symptom onset. PCP: Rachel Pope MD    Current Outpatient Medications   Medication Sig Dispense Refill    lisinopriL (PRINIVIL, ZESTRIL) 2.5 mg tablet Take 1 Tablet by mouth daily. 90 Tablet 1    traMADoL (ULTRAM) 50 mg tablet Take 1 Tablet by mouth daily as needed for Pain for up to 30 days. 30 Tablet 0    diclofenac (VOLTAREN) 1 % gel Apply  to affected area two (2) times daily as needed for Pain. 100 g 3    metFORMIN (GLUCOPHAGE) 1,000 mg tablet Take 1 Tablet by mouth two (2) times daily (with meals). 60 Tablet 6    glimepiride (AMARYL) 2 mg tablet Take 1 tablet by mouth twice daily 180 Tablet 0    SITagliptin (Januvia) 50 mg tablet Take 1 Tablet by mouth daily. 30 Tablet 5    glucose blood VI test strips (blood glucose test) strip Check BG 1-2 times/day 100 Strip 11    Blood-Glucose Meter (ReliOn All-In-One Meter) monitoring kit Check BG 1-2 times/day 1 Kit 0    lancets misc Check BS  Each 11    acetaminophen (TYLENOL) 325 mg tablet Take 2 Tabs by mouth three (3) times daily as needed for Pain.  60 Tab 2    folic acid (FOLVITE) 1 mg tablet Take 1 Tab by mouth daily. 30 Tab 0    multivitamin, tx-iron-ca-min (THERA-M W/ IRON) 9 mg iron-400 mcg tab tablet Take 1 Tab by mouth daily. 30 Tab 0    thiamine mononitrate (B-1) 100 mg tablet Take 1 Tab by mouth daily. 30 Tab 0       Past History     Past Medical History:  Past Medical History:   Diagnosis Date    Arthritis     Eczema 7/11/2012       Past Surgical History:  Past Surgical History:   Procedure Laterality Date    COLONOSCOPY N/A 1/14/2020    [facility]    HX ORTHOPAEDIC      L knee scope       Family History:  Family History   Problem Relation Age of Onset   Cameron Monsalve Hypertension Mother     OSTEOARTHRITIS Mother     No Known Problems Father     Diabetes Sister        Social History:  Social History     Tobacco Use    Smoking status: Current Every Day Smoker     Packs/day: 0.50    Smokeless tobacco: Never Used   Vaping Use    Vaping Use: Never used   Substance Use Topics    Alcohol use: Yes     Alcohol/week: 10.0 - 11.0 standard drinks     Types: 3 Cans of beer, 2 - 3 Shots of liquor, 5 Standard drinks or equivalent per week     Comment: daily    30 - 35 drinks weekly    Drug use: Not Currently     Types: Cocaine, Heroin     Comment: remote hx heroine and cocaine       Allergies: Allergies   Allergen Reactions    Doxycycline Itching    Pcn [Penicillins] Hives         Review of Systems   Review of Systems   Constitutional: Positive for chills. Negative for fever. HENT: Negative for congestion, rhinorrhea and sore throat. Respiratory: Positive for cough. Negative for chest tightness, shortness of breath and wheezing. Cardiovascular: Negative for chest pain and leg swelling. Gastrointestinal: Negative for abdominal pain, constipation, diarrhea, nausea and vomiting. Genitourinary: Negative for dysuria, frequency and urgency. Musculoskeletal: Positive for arthralgias (shoulder) and back pain.  Negative for gait problem, joint swelling, myalgias and neck pain. Skin: Negative for wound. Neurological: Negative for dizziness, numbness and headaches. All other systems reviewed and are negative. Physical Exam     Vitals:    01/09/22 0934   BP: (!) 153/92   Pulse: 76   Resp: 12   Temp: 98.4 °F (36.9 °C)   SpO2: 99%   Weight: 83.9 kg (185 lb)   Height: 6' (1.829 m)     Physical Exam  Vitals and nursing note reviewed. Constitutional:       General: He is not in acute distress. Appearance: He is well-developed. He is not ill-appearing. HENT:      Head: Normocephalic and atraumatic. Right Ear: Tympanic membrane, ear canal and external ear normal.      Left Ear: Tympanic membrane, ear canal and external ear normal.      Nose: Congestion (R nare ) present. No rhinorrhea. Mouth/Throat:      Mouth: Mucous membranes are moist.      Pharynx: Oropharynx is clear. No oropharyngeal exudate or posterior oropharyngeal erythema. Eyes:      Extraocular Movements: Extraocular movements intact. Conjunctiva/sclera: Conjunctivae normal.      Pupils: Pupils are equal, round, and reactive to light. Cardiovascular:      Rate and Rhythm: Normal rate and regular rhythm. Pulses: Normal pulses. Heart sounds: Normal heart sounds. Pulmonary:      Effort: Pulmonary effort is normal.      Breath sounds: Normal breath sounds. Abdominal:      General: Bowel sounds are normal. There is no distension. Palpations: Abdomen is soft. Tenderness: There is no abdominal tenderness. There is no guarding or rebound. Musculoskeletal:      Cervical back: Normal range of motion and neck supple. Lymphadenopathy:      Cervical: No cervical adenopathy. Skin:     General: Skin is warm and dry. Neurological:      Mental Status: He is alert and oriented to person, place, and time. GCS: GCS eye subscore is 4. GCS verbal subscore is 5. GCS motor subscore is 6. Cranial Nerves: No cranial nerve deficit.    Psychiatric: Thought Content: Thought content normal.           Diagnostic Study Results     Labs -   No results found for this or any previous visit (from the past 12 hour(s)). Radiologic Studies -   No orders to display     CT Results  (Last 48 hours)    None        CXR Results  (Last 48 hours)    None            Medical Decision Making   I am the first provider for this patient. I reviewed the vital signs, available nursing notes, past medical history, past surgical history, family history and social history. Vital Signs-Reviewed the patient's vital signs. Records Reviewed: Nursing Notes, Old Medical Records and Previous Laboratory Studies    Provider Notes (Medical Decision Making):       ED COURSE:   Initial assessment performed. The patients presenting problems have been discussed, and they are in agreement with the care plan formulated and outlined with them. I have encouraged them to ask questions as they arise throughout their visit. 41-year-old male presenting for concerns for COVID-19. Lungs are clear bilaterally with no signs of respiratory distress noted. Discharge Covid order placed. BP mildly elevated but patient does not have any symptoms of headache, chest pain, shortness of breath, numbness, tingling. HYPERTENSION COUNSELING  For 10 minutes, education was provided to the patient today regarding their hypertension. Patient is made aware of their elevated blood pressure and is instructed to follow up with their PCP. Patient is counseled regarding consequences of chronic, uncontrolled hypertension including kidney disease, heart disease, stroke or even death. Patient states their understanding and agrees to follow up this week. I reviewed heart healthy diet in addition to exercise for blood pressure management.  The patient verbalized understanding    Tobacco Counseling  Discussed the risks of smoking and the benefits of smoking cessation as well as the long term sequelae of smoking with the patient. The patient verbalized their understanding. Counseled patient for approximately 5 minutes. DDX: COVID 19, URI, Bronchitis, Influenza, tobacco use, Asthma, COPD           Disposition:  Discharge   DISCHARGE NOTE:   10:53 AM        Care plan outlined and precautions discussed. Patient has no new complaints, changes, or physical findings. All of pt's questions and concerns were addressed. Patient was instructed and agrees to follow up with PCP, as well as to return to the ED upon further deterioration. Patient is ready to go home. Follow-up Information     Follow up With Specialties Details Why Contact Info    Shivam Davis MD Internal Medicine Call in 1 week As needed, If symptoms worsen [address]  [address]   [phone]            Current Discharge Medication List          Procedures:  Procedures    Please note that this dictation was completed with Dragon, computer voice recognition software. Quite often unanticipated grammatical, syntax, homophones, and other interpretive errors are inadvertently transcribed by the computer software. Please disregard these errors. Additionally, please excuse any errors that have escaped final proofreading. Diagnosis     Clinical Impression:   1. Person under investigation for COVID-19    2. Hypertension, unspecified type    3.  Tobacco use

## 2022-01-09 NOTE — ED NOTES
Pt in with cough, concern for COVID, right shoulder pain x 2 days. Pt denies known exposure to COVID, has been vaccinated.

## 2022-01-09 NOTE — DISCHARGE INSTRUCTIONS
It was a pleasure taking care of you at [facility] Emergency Department today. We know that when you come to [facility], you are entrusting us with your health, comfort, and safety. Our physicians and nurses honor that trust, and we truly appreciate the opportunity to care for you and your loved ones. We also value our feedback. If you receive a survey about your Emergency Department experience today, please fill it out. We care about our patients' feedback, and we listen to what you have to say. Thank you!

## 2022-01-10 ENCOUNTER — DOCUMENTATION ONLY (OUTPATIENT)
Dept: INTERNAL MEDICINE CLINIC | Age: 62
End: 2022-01-10

## 2022-01-10 ENCOUNTER — PATIENT OUTREACH (OUTPATIENT)
Dept: CASE MANAGEMENT | Age: 62
End: 2022-01-10

## 2022-01-10 ENCOUNTER — APPOINTMENT (OUTPATIENT)
Dept: GENERAL RADIOLOGY | Age: 62
End: 2022-01-10
Attending: EMERGENCY MEDICINE
Payer: MEDICAID

## 2022-01-10 ENCOUNTER — HOSPITAL ENCOUNTER (EMERGENCY)
Age: 62
Discharge: HOME | End: 2022-01-10
Attending: EMERGENCY MEDICINE
Payer: MEDICAID

## 2022-01-10 VITALS
TEMPERATURE: 98.1 F | OXYGEN SATURATION: 97 % | SYSTOLIC BLOOD PRESSURE: 156 MMHG | DIASTOLIC BLOOD PRESSURE: 60 MMHG | HEART RATE: 67 BPM | HEIGHT: 72 IN | RESPIRATION RATE: 18 BRPM | BODY MASS INDEX: 25.06 KG/M2 | WEIGHT: 185 LBS

## 2022-01-10 DIAGNOSIS — Z72.0 TOBACCO ABUSE: ICD-10-CM

## 2022-01-10 DIAGNOSIS — K59.00 CONSTIPATION, UNSPECIFIED CONSTIPATION TYPE: ICD-10-CM

## 2022-01-10 DIAGNOSIS — E11.65 UNCONTROLLED TYPE 2 DIABETES MELLITUS WITH HYPERGLYCEMIA (HCC): ICD-10-CM

## 2022-01-10 DIAGNOSIS — K29.90 GASTRITIS AND DUODENITIS: ICD-10-CM

## 2022-01-10 DIAGNOSIS — R10.9 ACUTE ABDOMINAL PAIN: Primary | ICD-10-CM

## 2022-01-10 LAB
ALBUMIN SERPL-MCNC: 3.6 G/DL (ref 3.5–5)
ALBUMIN/GLOB SERPL: 0.9 {RATIO} (ref 1.1–2.2)
ALP SERPL-CCNC: 64 U/L (ref 45–117)
ALT SERPL-CCNC: 27 U/L (ref 12–78)
ANION GAP SERPL CALC-SCNC: 7 MMOL/L (ref 5–15)
APPEARANCE UR: CLEAR
AST SERPL-CCNC: 27 U/L (ref 15–37)
BACTERIA URNS QL MICRO: NEGATIVE /HPF
BASOPHILS # BLD: 0 K/UL (ref 0–0.1)
BASOPHILS NFR BLD: 0 % (ref 0–1)
BILIRUB SERPL-MCNC: 0.2 MG/DL (ref 0.2–1)
BILIRUB UR QL: NEGATIVE
BUN SERPL-MCNC: 17 MG/DL (ref 6–20)
BUN/CREAT SERPL: 17 (ref 12–20)
CALCIUM SERPL-MCNC: 8.8 MG/DL (ref 8.5–10.1)
CHLORIDE SERPL-SCNC: 102 MMOL/L (ref 97–108)
CO2 SERPL-SCNC: 28 MMOL/L (ref 21–32)
COLOR UR: ABNORMAL
CREAT SERPL-MCNC: 1 MG/DL (ref 0.7–1.3)
DIFFERENTIAL METHOD BLD: ABNORMAL
EOSINOPHIL # BLD: 0 K/UL (ref 0–0.4)
EOSINOPHIL NFR BLD: 0 % (ref 0–7)
EPITH CASTS URNS QL MICRO: ABNORMAL /LPF
ERYTHROCYTE [DISTWIDTH] IN BLOOD BY AUTOMATED COUNT: 12.9 % (ref 11.5–14.5)
GLOBULIN SER CALC-MCNC: 4.2 G/DL (ref 2–4)
GLUCOSE SERPL-MCNC: 220 MG/DL (ref 65–100)
GLUCOSE UR STRIP.AUTO-MCNC: 250 MG/DL
HCT VFR BLD AUTO: 43.2 % (ref 36.6–50.3)
HGB BLD-MCNC: 13.4 G/DL (ref 12.1–17)
HGB UR QL STRIP: NEGATIVE
IMM GRANULOCYTES # BLD AUTO: 0 K/UL
IMM GRANULOCYTES NFR BLD AUTO: 0 %
KETONES UR QL STRIP.AUTO: NEGATIVE MG/DL
LEUKOCYTE ESTERASE UR QL STRIP.AUTO: NEGATIVE
LIPASE SERPL-CCNC: 230 U/L (ref 73–393)
LYMPHOCYTES # BLD: 0.6 K/UL (ref 0.8–3.5)
LYMPHOCYTES NFR BLD: 13 % (ref 12–49)
MCH RBC QN AUTO: 26.8 PG (ref 26–34)
MCHC RBC AUTO-ENTMCNC: 31 G/DL (ref 30–36.5)
MCV RBC AUTO: 86.4 FL (ref 80–99)
MONOCYTES # BLD: 0.1 K/UL (ref 0–1)
MONOCYTES NFR BLD: 2 % (ref 5–13)
NEUTS BAND NFR BLD MANUAL: 21 % (ref 0–6)
NEUTS SEG # BLD: 4 K/UL (ref 1.8–8)
NEUTS SEG NFR BLD: 64 % (ref 32–75)
NITRITE UR QL STRIP.AUTO: NEGATIVE
NRBC # BLD: 0 K/UL (ref 0–0.01)
NRBC BLD-RTO: 0 PER 100 WBC
PH UR STRIP: 5 [PH] (ref 5–8)
PLATELET # BLD AUTO: 149 K/UL (ref 150–400)
PMV BLD AUTO: 11 FL (ref 8.9–12.9)
POTASSIUM SERPL-SCNC: 4.2 MMOL/L (ref 3.5–5.1)
PROT SERPL-MCNC: 7.8 G/DL (ref 6.4–8.2)
PROT UR STRIP-MCNC: 30 MG/DL
RBC # BLD AUTO: 5 M/UL (ref 4.1–5.7)
RBC #/AREA URNS HPF: ABNORMAL /HPF (ref 0–5)
RBC MORPH BLD: ABNORMAL
SARS-COV-2, XPLCVT: DETECTED
SODIUM SERPL-SCNC: 137 MMOL/L (ref 136–145)
SOURCE, COVRS: ABNORMAL
SP GR UR REFRACTOMETRY: >1.03 (ref 1–1.03)
UA: UC IF INDICATED,UAUC: ABNORMAL
UROBILINOGEN UR QL STRIP.AUTO: 0.2 EU/DL (ref 0.2–1)
WBC # BLD AUTO: 4.7 K/UL (ref 4.1–11.1)
WBC URNS QL MICRO: ABNORMAL /HPF (ref 0–4)

## 2022-01-10 PROCEDURE — 83690 ASSAY OF LIPASE: CPT

## 2022-01-10 PROCEDURE — 74011250637 HC RX REV CODE- 250/637: Performed by: EMERGENCY MEDICINE

## 2022-01-10 PROCEDURE — 74018 RADEX ABDOMEN 1 VIEW: CPT

## 2022-01-10 PROCEDURE — 74011250636 HC RX REV CODE- 250/636: Performed by: EMERGENCY MEDICINE

## 2022-01-10 PROCEDURE — 85025 COMPLETE CBC W/AUTO DIFF WBC: CPT

## 2022-01-10 PROCEDURE — 96374 THER/PROPH/DIAG INJ IV PUSH: CPT

## 2022-01-10 PROCEDURE — 99283 EMERGENCY DEPT VISIT LOW MDM: CPT

## 2022-01-10 PROCEDURE — 80053 COMPREHEN METABOLIC PANEL: CPT

## 2022-01-10 PROCEDURE — 81001 URINALYSIS AUTO W/SCOPE: CPT

## 2022-01-10 PROCEDURE — 74011000250 HC RX REV CODE- 250: Performed by: EMERGENCY MEDICINE

## 2022-01-10 RX ORDER — POLYETHYLENE GLYCOL 3350 17 G/17G
17 POWDER, FOR SOLUTION ORAL DAILY
Qty: 289 G | Refills: 0 | Status: SHIPPED | OUTPATIENT
Start: 2022-01-10

## 2022-01-10 RX ORDER — DICYCLOMINE HYDROCHLORIDE 10 MG/1
20 CAPSULE ORAL
Status: COMPLETED | OUTPATIENT
Start: 2022-01-10 | End: 2022-01-10

## 2022-01-10 RX ORDER — FAMOTIDINE 20 MG/1
20 TABLET, FILM COATED ORAL 2 TIMES DAILY
Qty: 20 TABLET | Refills: 0 | Status: SHIPPED | OUTPATIENT
Start: 2022-01-10 | End: 2022-01-20

## 2022-01-10 RX ORDER — MAGNESIUM CITRATE
296 SOLUTION, ORAL ORAL
Status: COMPLETED | OUTPATIENT
Start: 2022-01-10 | End: 2022-01-10

## 2022-01-10 RX ORDER — DICYCLOMINE HYDROCHLORIDE 20 MG/1
20 TABLET ORAL EVERY 6 HOURS
Qty: 20 TABLET | Refills: 0 | Status: SHIPPED | OUTPATIENT
Start: 2022-01-10 | End: 2022-05-18 | Stop reason: ALTCHOICE

## 2022-01-10 RX ADMIN — DICYCLOMINE HYDROCHLORIDE 20 MG: 10 CAPSULE ORAL at 01:27

## 2022-01-10 RX ADMIN — MAGNESIUM CITRATE 296 ML: 1.75 LIQUID ORAL at 02:41

## 2022-01-10 RX ADMIN — FAMOTIDINE 20 MG: 10 INJECTION, SOLUTION INTRAVENOUS at 01:37

## 2022-01-10 NOTE — ED NOTES
Pt presents to ED with c/o diffuse, sharp abdominal pain x yesterday AM. Pt denies N/V/D. Last BM yesterday and pt states it was hard to pass. Pt awaiting results from a COVID test. RR even and unlabored, skin is warm and dry. NAD noted. Emergency Department Nursing Plan of Care       The Nursing Plan of Care is developed from the Nursing assessment and Emergency Department Attending provider initial evaluation. The plan of care may be reviewed in the ED Provider note.     The Plan of Care was developed with the following considerations:   Patient / Family readiness to learn indicated by:verbalized understanding  Persons(s) to be included in education: patient  Barriers to Learning/Limitations:No    Signed     Elisabeth Scott RN    1/10/2022   1:52 AM

## 2022-01-10 NOTE — TELEPHONE ENCOUNTER
Spoke with pt, discussed his two ER visits and he is feeling much better, will call with any changes.

## 2022-01-10 NOTE — ED TRIAGE NOTES
Pt reporting lower abd pain x yesterday evening with difficulty passing stools. Pending COVID result.

## 2022-01-10 NOTE — DISCHARGE INSTRUCTIONS
Thank You! It was a pleasure taking care of you in our Emergency Department today. We know that when you come to [address] [place] [address], you are entrusting us with your health, comfort, and safety. Our physicians and nurses honor that trust, and truly appreciate the opportunity to care for you and your loved ones. We also value your feedback. If you receive a survey about your Emergency Department experience today, please fill it out. We care about our patients' feedback, and we listen to what you have to say. Thank you. Dr. Madan Simmons M.D.      ____________________________________________________________________  I have included a copy of your lab results and/or radiologic studies from today's visit so you can have them easily available at your follow-up visit. We hope you feel better and please do not hesitate to contact the ED if you have any questions at all! Recent Results (from the past 12 hour(s))   CBC WITH AUTOMATED DIFF    Collection Time: 01/10/22  1:41 AM   Result Value Ref Range    WBC 4.7 4.1 - 11.1 K/uL    RBC 5.00 4. 10 - 5.70 M/uL    HGB 13.4 12.1 - 17.0 g/dL    HCT 43.2 36.6 - 50.3 %    MCV 86.4 80.0 - 99.0 FL    MCH 26.8 26.0 - 34.0 PG    MCHC 31.0 30.0 - 36.5 g/dL    RDW 12.9 11.5 - 14.5 %    PLATELET 245 (L) 234 - 400 K/uL    MPV 11.0 8.9 - 12.9 FL    NRBC 0.0 0  WBC    ABSOLUTE NRBC 0.00 0.00 - 0.01 K/uL    NEUTROPHILS 64 32 - 75 %    BAND NEUTROPHILS 21 (H) 0 - 6 %    LYMPHOCYTES 13 12 - 49 %    MONOCYTES 2 (L) 5 - 13 %    EOSINOPHILS 0 0 - 7 %    BASOPHILS 0 0 - 1 %    IMMATURE GRANULOCYTES 0 %    ABS. NEUTROPHILS 4.0 1.8 - 8.0 K/UL    ABS. LYMPHOCYTES 0.6 (L) 0.8 - 3.5 K/UL    ABS. MONOCYTES 0.1 0.0 - 1.0 K/UL    ABS. EOSINOPHILS 0.0 0.0 - 0.4 K/UL    ABS. BASOPHILS 0.0 0.0 - 0.1 K/UL    ABS. IMM.  GRANS. 0.0 K/UL    DF MANUAL      RBC COMMENTS NORMOCYTIC, NORMOCHROMIC     METABOLIC PANEL, COMPREHENSIVE    Collection Time: 01/10/22  1:41 AM   Result Value Ref Range    Sodium 137 136 - 145 mmol/L    Potassium 4.2 3.5 - 5.1 mmol/L    Chloride 102 97 - 108 mmol/L    CO2 28 21 - 32 mmol/L    Anion gap 7 5 - 15 mmol/L    Glucose 220 (H) 65 - 100 mg/dL    BUN 17 6 - 20 MG/DL    Creatinine 1.00 0.70 - 1.30 MG/DL    BUN/Creatinine ratio 17 12 - 20      GFR est AA >60 >60 ml/min/1.73m2    GFR est non-AA >60 >60 ml/min/1.73m2    Calcium 8.8 8.5 - 10.1 MG/DL    Bilirubin, total 0.2 0.2 - 1.0 MG/DL    ALT (SGPT) 27 12 - 78 U/L    AST (SGOT) 27 15 - 37 U/L    Alk. phosphatase 64 45 - 117 U/L    Protein, total 7.8 6.4 - 8.2 g/dL    Albumin 3.6 3.5 - 5.0 g/dL    Globulin 4.2 (H) 2.0 - 4.0 g/dL    A-G Ratio 0.9 (L) 1.1 - 2.2     LIPASE    Collection Time: 01/10/22  1:41 AM   Result Value Ref Range    Lipase 230 73 - 393 U/L   URINALYSIS W/ REFLEX CULTURE    Collection Time: 01/10/22  1:41 AM    Specimen: Urine   Result Value Ref Range    Color YELLOW/STRAW      Appearance CLEAR CLEAR      Specific gravity >1.030 (H) 1.003 - 1.030    pH (UA) 5.0 5.0 - 8.0      Protein 30 (A) NEG mg/dL    Glucose 250 (A) NEG mg/dL    Ketone Negative NEG mg/dL    Bilirubin Negative NEG      Blood Negative NEG      Urobilinogen 0.2 0.2 - 1.0 EU/dL    Nitrites Negative NEG      Leukocyte Esterase Negative NEG      WBC 0-4 0 - 4 /hpf    RBC 0-5 0 - 5 /hpf    Epithelial cells FEW FEW /lpf    Bacteria Negative NEG /hpf    UA:UC IF INDICATED CULTURE NOT INDICATED BY UA RESULT CNI         XR ABD (KUB)   Final Result   No obstruction or free intraperitoneal air. CT Results  (Last 48 hours)      None          The exam and treatment you received in the Emergency Department were for an urgent problem and are not intended as complete care. It is important that you follow up with a doctor, nurse practitioner, or physician assistant for ongoing care.  If your symptoms become worse or you do not improve as expected and you are unable to reach your usual health care provider, you should return to the Emergency Department. We are available 24 hours a day. Please take your discharge instructions with you when you go to your follow-up appointment. If a prescription has been provided, please have it filled as soon as possible to prevent a delay in treatment. Read the entire medication instruction sheet provided to you by the pharmacy. If you have any questions or reservations about taking the medication due to side effects or interactions with other medications, please call your primary care physician or contact the ER to speak with the charge nurse. Please make an appointment with your family doctor or the physician you were referred to for follow-up of this visit as instructed on your discharge paperwork. Return to the ER if you are unable to be seen or if you are unable to be seen in a timely manner. If you have any problem arranging the follow-up visit, contact the Emergency Department immediately.

## 2022-01-10 NOTE — ED NOTES
Discharge instructions were given to the patient by Maxime Vo RN. The patient left the Emergency Department ambulatory, alert and oriented and in no acute distress with four prescriptions. The patient was encouraged to call or return to the ED for worsening issues or problems and was encouraged to schedule a follow up appointment for continuing care. The patient verbalized understanding of discharge instructions and prescriptions, all questions were answered. The patient has no further concerns at this time.

## 2022-01-10 NOTE — PROGRESS NOTES
Patient contacted regarding COVID-19 risk. Discussed COVID-19 related testing which was pending at this time. Test results were pending. Patient informed of results, if available? yes. Ambulatory Care Manager contacted the patient by telephone to perform post discharge assessment. Call within 2 business days of discharge: Yes Verified name and  with patient as identifiers. Provided introduction to self, and explanation of the CTN/ACM role, and reason for call due to risk factors for infection and/or exposure to COVID-19. Symptoms reviewed with patient who verbalized the following symptoms: no new symptoms and no worsening symptoms      Due to no new or worsening symptoms encounter was not routed to provider for escalation. Discussed follow-up appointments. If no appointment was previously scheduled, appointment scheduling offered:  no. [facility] follow up appointment(s): No future appointments. Non-[facility] follow up appointment(s): Interventions to address risk factors: Obtained and reviewed discharge summary and/or continuity of care documents     Advance Care Planning:   Does patient have an Advance Directive: not on file. Educated patient about risk for severe COVID-19 due to risk factors according to CDC guidelines. ACM reviewed discharge instructions, medical action plan and red flag symptoms with the patient who verbalized understanding. Discussed COVID vaccination status: yes. Education provided on COVID-19 vaccination as appropriate. Discussed exposure protocols and quarantine with CDC Guidelines. Patient was given an opportunity to verbalize any questions and concerns and agrees to contact ACM or health care provider for questions related to their healthcare. Reviewed and educated patient on any new and changed medications related to discharge diagnosis     Was patient discharged with a pulse oximeter?  no Discussed and confirmed pulse oximeter discharge instructions and when to notify provider or seek emergency care. ACM provided contact information. Plan for follow-up call in 5-7 days based on severity of symptoms and risk factors.

## 2022-01-17 ENCOUNTER — VIRTUAL VISIT (OUTPATIENT)
Dept: INTERNAL MEDICINE CLINIC | Age: 62
End: 2022-01-17
Payer: MEDICAID

## 2022-01-17 ENCOUNTER — PATIENT OUTREACH (OUTPATIENT)
Dept: CASE MANAGEMENT | Age: 62
End: 2022-01-17

## 2022-01-17 DIAGNOSIS — E11.9 TYPE 2 DIABETES MELLITUS WITHOUT COMPLICATION, WITHOUT LONG-TERM CURRENT USE OF INSULIN (HCC): ICD-10-CM

## 2022-01-17 DIAGNOSIS — U07.1 COVID-19: Primary | ICD-10-CM

## 2022-01-17 DIAGNOSIS — I10 ESSENTIAL HYPERTENSION: ICD-10-CM

## 2022-01-17 DIAGNOSIS — M17.0 PRIMARY OSTEOARTHRITIS OF BOTH KNEES: ICD-10-CM

## 2022-01-17 PROCEDURE — 99214 OFFICE O/P EST MOD 30 MIN: CPT | Performed by: INTERNAL MEDICINE

## 2022-01-17 RX ORDER — ASCORBIC ACID 500 MG
500 TABLET ORAL DAILY
Qty: 30 TABLET | Refills: 0 | Status: SHIPPED | OUTPATIENT
Start: 2022-01-17

## 2022-01-17 RX ORDER — MELATONIN
1000 DAILY
Qty: 30 TABLET | Refills: 2 | Status: SHIPPED | OUTPATIENT
Start: 2022-01-17

## 2022-01-17 NOTE — PROGRESS NOTES
Health Maintenance Due   Topic Date Due    Eye Exam Retinal or Dilated  Never done    DTaP/Tdap/Td series (1 - Tdap) Never done    Shingrix Vaccine Age 50> (1 of 2) Never done    Pneumococcal 0-64 years (1 of 2 - PPSV23) 11/06/2019    COVID-19 Vaccine (3 - Booster) 11/23/2021       Chief Complaint   Patient presents with   [facility] Follow Up     Covid -19    Diabetes    Abdominal Pain       1. Have you been to the ER, urgent care clinic since your last visit? Hospitalized since your last visit? Yes, 1/9/22, 1/10/22    2. Have you seen or consulted any other health care providers outside of the [address] [place] [address] since your last visit? Include any pap smears or colon screening. No    3) Do you have an Advance Directive on file? no    4) Are you interested in receiving information on Advance Directives? NO      Patient is accompanied by self I have received verbal consent from Cele Palma to discuss any/all medical information while they are present in the room.

## 2022-01-17 NOTE — PROGRESS NOTES
Meche Zapata is a 59 y.o. male who was seen by synchronous (real-time) audio-video technology on 1/17/2022 for Hospital Follow Up (Covid -19 and abdominal), Diabetes, and Abdominal Pain        Assessment & Plan:   Diagnoses and all orders for this visit:    1. COVID-19    No symptoms. Advised to wear mask and be quarantined for 10 days. We will give,  -     ascorbic acid, vitamin C, (VITAMIN C) 500 mg tablet; Take 1 Tablet by mouth daily. -     cholecalciferol (VITAMIN D3) (1000 Units /25 mcg) tablet; Take 1 Tablet by mouth daily. 2. Type 2 diabetes mellitus without complication, without long-term current use of insulin ([facility])    A1c 6.8. Be on ADA diet and exercise. Compliant with all medication. Will refill,  -     SITagliptin (Januvia) 50 mg tablet; Take 1 Tablet by mouth daily. 3. Essential hypertension    On lisinopril. Doing well. 4. DJD knee  On tramadol. Not using it. I spent at least 30 minutes on this visit with this established patient. Subjective:     Mr. Talya Real is here for hospital follow-up. He went [facility] with abdominal pain, blood work done, all reviewed. He was tested for COVID and came back COVID-positive. He has no symptoms. No fever. He mentioned that he sweats at night. Not able to have a detect any fever. X-ray abdomen was negative. Had lab work done, seems stable. He was sent home with liquid antacid, dicyclomine and Pepcid. He has only taking liquid antacid which subsided his abdominal pain, he is not taking any other medication. He is diabetic, recent A1c 6.8, which slightly worse. He is compliant with his medication. Got lisinopril refilled. Blood pressure seems okay. He suffers from aches and pains in the joint. Was prescribed tramadol, not taking it. Prior to Admission medications    Medication Sig Start Date End Date Taking? Authorizing Provider   SITagliptin (Januvia) 50 mg tablet Take 1 Tablet by mouth daily.  1/17/22  Yes Hima Galo Shanell Rebollar MD   ascorbic acid, vitamin C, (VITAMIN C) 500 mg tablet Take 1 Tablet by mouth daily. 1/17/22  Yes Rome Schaeffer MD   cholecalciferol (VITAMIN D3) (1000 Units /25 mcg) tablet Take 1 Tablet by mouth daily. 1/17/22  Yes Rome Schaeffer MD   dicyclomine (BENTYL) 20 mg tablet Take 1 Tablet by mouth every six (6) hours. 1/10/22  Yes Lindsey Wellington MD   famotidine (Pepcid) 20 mg tablet Take 1 Tablet by mouth two (2) times a day for 10 days. 1/10/22 1/20/22 Yes Lindsey Wellington MD   aluminum-magnesium hydroxide (MAALOX) 200-200 mg/5 mL suspension Take 15 mL by mouth every six (6) hours as needed for Indigestion. 1/10/22  Yes Lindsey Wellington MD   polyethylene glycol (Miralax) 17 gram/dose powder Take 17 g by mouth daily. 1 tablespoon with 8 oz of water daily 1/10/22  Yes Lindsey Wellington MD   lisinopriL (PRINIVIL, ZESTRIL) 2.5 mg tablet Take 1 Tablet by mouth daily. 1/6/22  Yes Rome Schaeffer MD   traMADoL Letty Ananda) 50 mg tablet Take 1 Tablet by mouth daily as needed for Pain for up to 30 days. 1/6/22 2/5/22 Yes Rome Schaeffer MD   diclofenac (VOLTAREN) 1 % gel Apply  to affected area two (2) times daily as needed for Pain. 1/6/22  Yes Rome Schaeffer MD   metFORMIN (GLUCOPHAGE) 1,000 mg tablet Take 1 Tablet by mouth two (2) times daily (with meals). 1/6/22  Yes Rome Schaeffer MD   glimepiride (AMARYL) 2 mg tablet Take 1 tablet by mouth twice daily 10/20/21  Yes Rome Schaeffer MD   glucose blood VI test strips (blood glucose test) strip Check BG 1-2 times/day 9/27/21  Yes Rome Schaeffer MD   Blood-Glucose Meter (ReliOn All-In-One Meter) monitoring kit Check BG 1-2 times/day 9/27/21  Yes Rome Schaeffer MD   lancets misc Check BS BID 9/27/21  Yes Rome Schaeffer MD   acetaminophen (TYLENOL) 325 mg tablet Take 2 Tabs by mouth three (3) times daily as needed for Pain. 11/6/19  Yes Rome Schaeffer MD   folic acid (FOLVITE) 1 mg tablet Take 1 Tab by mouth daily.  9/7/19  Yes July Burnham NP   multivitamin, tx-iron-ca-min (THERA-M W/ IRON) 9 mg iron-400 mcg tab tablet Take 1 Tab by mouth daily. 9/7/19  Yes July Burnham NP   thiamine mononitrate (B-1) 100 mg tablet Take 1 Tab by mouth daily. 9/7/19  Yes July Burnham NP   SITagliptin (Januvia) 50 mg tablet Take 1 Tablet by mouth daily. 9/27/21 1/17/22  Prasanna Nath MD     Past Medical History:   Diagnosis Date    Arthritis     Eczema 7/11/2012       ROS significant for night sweats. Objective:     Patient-Reported Vitals 1/17/2022   Patient-Reported Weight 185 lbs   Patient-Reported Height -   Patient-Reported Pulse -   Patient-Reported Temperature -   Patient-Reported SpO2 -          Constitutional: [x] Appears well-developed and well-nourished [x] No apparent distress      [] Abnormal -     Mental status: [x] Alert and awake  [x] Oriented to person/place/time [x] Able to follow commands    [] Abnormal -         HENT: [x] Normocephalic, atraumatic  [] Abnormal -   [x] Mouth/Throat: Mucous membranes are moist    External Ears [x] Normal  [] Abnormal -    Neck: [x] No visualized mass [] Abnormal -     Pulmonary/Chest: [x] Respiratory effort normal   [x] No visualized signs of difficulty breathing or respiratory distress        [] Abnormal -      Musculoskeletal:   [x] Normal gait with no signs of ataxia         [x] Normal range of motion of neck        [] Abnormal -     Neurological:        [x] No Facial Asymmetry (Cranial nerve 7 motor function) (limited exam due to video visit)          [x] No gaze palsy        [] Abnormal -          Skin:        [x] No significant exanthematous lesions or discoloration noted on facial skin         [] Abnormal -            Psychiatric:       [x] Normal Affect [] Abnormal -        [x] No Hallucinations    Other pertinent observable physical exam findings:-        We discussed the expected course, resolution and complications of the diagnosis(es) in detail. Medication risks, benefits, costs, interactions, and alternatives were discussed as indicated. I advised him to contact the office if his condition worsens, changes or fails to improve as anticipated. He expressed understanding with the diagnosis(es) and plan. Jocelyne Griffin, was evaluated through a synchronous (real-time) audio-video encounter. The patient (or guardian if applicable) is aware that this is a billable service. Verbal consent to proceed has been obtained within the past 12 months. The visit was conducted pursuant to the emergency declaration under the [address] [place] [address] authority and the [employer] and [employer] General Act. Patient identification was verified, and a caregiver was present when appropriate. The patient was located in a state where the provider was credentialed to provide care.       Jona Bella MD

## 2022-03-18 PROBLEM — E86.0 DEHYDRATION: Status: ACTIVE | Noted: 2019-09-05

## 2022-03-18 PROBLEM — E11.21 TYPE 2 DIABETES WITH NEPHROPATHY (HCC): Status: ACTIVE | Noted: 2019-05-09

## 2022-03-19 PROBLEM — Z86.19 HEPATITIS C VIRUS INFECTION CURED AFTER ANTIVIRAL DRUG THERAPY: Status: ACTIVE | Noted: 2021-07-13

## 2022-03-19 PROBLEM — Z72.0 TOBACCO ABUSE: Status: ACTIVE | Noted: 2019-09-06

## 2022-03-19 PROBLEM — R76.8 HEPATITIS C ANTIBODY POSITIVE IN BLOOD: Status: ACTIVE | Noted: 2021-07-13

## 2022-03-19 PROBLEM — F10.10 ETOH ABUSE: Status: ACTIVE | Noted: 2019-09-06

## 2022-03-20 PROBLEM — N17.9 AKI (ACUTE KIDNEY INJURY) (HCC): Status: ACTIVE | Noted: 2019-09-05

## 2022-05-18 ENCOUNTER — OFFICE VISIT (OUTPATIENT)
Dept: INTERNAL MEDICINE CLINIC | Age: 62
End: 2022-05-18
Payer: MEDICAID

## 2022-05-18 VITALS
TEMPERATURE: 98.2 F | OXYGEN SATURATION: 99 % | RESPIRATION RATE: 16 BRPM | HEART RATE: 88 BPM | SYSTOLIC BLOOD PRESSURE: 138 MMHG | BODY MASS INDEX: 25.6 KG/M2 | DIASTOLIC BLOOD PRESSURE: 78 MMHG | HEIGHT: 72 IN | WEIGHT: 189 LBS

## 2022-05-18 DIAGNOSIS — Z23 ENCOUNTER FOR IMMUNIZATION: ICD-10-CM

## 2022-05-18 DIAGNOSIS — E11.9 TYPE 2 DIABETES MELLITUS WITHOUT COMPLICATION, WITHOUT LONG-TERM CURRENT USE OF INSULIN (HCC): Primary | ICD-10-CM

## 2022-05-18 DIAGNOSIS — I10 ESSENTIAL HYPERTENSION: ICD-10-CM

## 2022-05-18 DIAGNOSIS — F10.10 ETOH ABUSE: ICD-10-CM

## 2022-05-18 DIAGNOSIS — M17.10 ARTHRITIS OF KNEE: ICD-10-CM

## 2022-05-18 PROCEDURE — 90732 PPSV23 VACC 2 YRS+ SUBQ/IM: CPT | Performed by: INTERNAL MEDICINE

## 2022-05-18 PROCEDURE — 99214 OFFICE O/P EST MOD 30 MIN: CPT | Performed by: INTERNAL MEDICINE

## 2022-05-18 RX ORDER — LISINOPRIL 2.5 MG/1
2.5 TABLET ORAL DAILY
Qty: 90 TABLET | Refills: 1 | Status: SHIPPED | OUTPATIENT
Start: 2022-05-18 | End: 2022-10-04 | Stop reason: SDUPTHER

## 2022-05-18 RX ORDER — ASPIRIN 325 MG/1
100 TABLET, FILM COATED ORAL DAILY
Qty: 30 TABLET | Refills: 5 | Status: SHIPPED | OUTPATIENT
Start: 2022-05-18

## 2022-05-18 RX ORDER — FOLIC ACID 1 MG/1
1 TABLET ORAL DAILY
Qty: 30 TABLET | Refills: 5 | Status: SHIPPED | OUTPATIENT
Start: 2022-05-18

## 2022-05-18 NOTE — PROGRESS NOTES
HISTORY OF PRESENT ILLNESS  Kevin Alston is a 59 y.o. male here for follow-up. Has hypertension, blood pressure seems stable. No chest pain palpitation or shortness of breath. Has diabetes, on Metformin. Need foot exam.  Need eye checkup. Labs are stable. Report both knee pain, left knee pain is worse than right. Already seen orthopedic. Arthroscopy done. Would like to get DMV form filled out  Colonoscopy up-to-date. Still drinking alcohol every day. Need Shingrix vaccine. Need a pneumonia shot. Hypertension     Knee Pain    Medication Refill    Diabetes        Review of Systems   Constitutional: Negative. HENT: Negative. Eyes: Negative. Respiratory: Negative. Cardiovascular: Negative. Gastrointestinal: Negative. Genitourinary: Negative. Musculoskeletal: Positive for back pain and joint pain. Skin: Negative. Neurological: Negative. Endo/Heme/Allergies: Negative. Psychiatric/Behavioral: Negative. Physical Exam  Constitutional:       General: He is not in acute distress. Appearance: Normal appearance. He is well-developed. Neck:      Thyroid: No thyromegaly. Vascular: No JVD. Cardiovascular:      Rate and Rhythm: Normal rate and regular rhythm. Pulses: Normal pulses. Heart sounds: Normal heart sounds. Pulmonary:      Effort: Pulmonary effort is normal. No respiratory distress. Breath sounds: Normal breath sounds. No wheezing. Musculoskeletal:         General: Tenderness present. Cervical back: Normal range of motion and neck supple. Comments:   Right knee: Tenderness on the right knee./Told him present. Range of motion restricted.     Diabetic foot exam:     Left Foot:   Visual Exam: normal    Pulse DP: 2+ (normal)   Filament test: normal sensation    Vibratory sensation: normal      Right Foot:   Visual Exam: normal    Pulse DP: 2+ (normal)   Filament test: normal sensation    Vibratory sensation: normal Neurological:      Mental Status: He is alert. Psychiatric:         Mood and Affect: Mood normal.         Behavior: Behavior normal.         ASSESSMENT and PLAN  Diagnoses and all orders for this visit:    1. Type 2 diabetes mellitus without complication, without long-term current use of insulin ([facility])    Last A1c 6.8. Advised to be an ADA diet. Will call in,  -     lisinopriL (PRINIVIL, ZESTRIL) 2.5 mg tablet; Take 1 Tablet by mouth daily.  -     METABOLIC PANEL, COMPREHENSIVE  -     HEMOGLOBIN A1C WITH EAG  -     SITagliptin (Januvia) 50 mg tablet; Take 1 Tablet by mouth daily.  -     REFERRAL TO OPHTHALMOLOGY    2. Essential hypertension    Stable. Will call in,  -     lisinopriL (PRINIVIL, ZESTRIL) 2.5 mg tablet; Take 1 Tablet by mouth daily.  -     METABOLIC PANEL, COMPREHENSIVE    3. ETOH abuse    Drinking 1 serving a day. Advised to cut down. We will give,  -     thiamine mononitrate (B-1) 100 mg tablet; Take 1 Tablet by mouth daily. -     folic acid (FOLVITE) 1 mg tablet; Take 1 Tablet by mouth daily. 4. Arthritis of knee  Arthroscopy done by orthopedics. Still having moderate pain in left knee. Will sign DMV form. 5. Encounter for immunization  We will give,  -     PNEUMOCOCCAL POLYSACCHARIDE VACCINE, 23-VALENT, ADULT OR IMMUNOSUPPRESSED PT DOSE,  -     varicella-zoster recombinant, PF, (SHINGRIX) 50 mcg/0.5 mL susr injection; 0.5 mL by IntraMUSCular route once for 1 dose. Issues reviewed with her: referral to Diabetic Education department, diabetic diet discussed in detail, written exchange diet given, home glucose monitoring emphasized, all medications, side effects and compliance discussed carefully, foot care discussed and Podiatry visits discussed, annual eye examinations at Ophthalmology discussed, long term diabetic complications discussed and labs immediately prior to next visit.

## 2022-05-18 NOTE — PROGRESS NOTES
ADVISED PATIENT OF THE FOLLOWING HEALTH MAINTAINCE DUE  Health Maintenance Due   Topic Date Due    Eye Exam Retinal or Dilated  Never done    DTaP/Tdap/Td series (1 - Tdap) Never done    Shingrix Vaccine Age 50> (1 of 2) Never done    Pneumococcal 0-64 years (2 - PPSV23 or PCV20) 09/11/2020    COVID-19 Vaccine (3 - Booster) 10/23/2021    Foot Exam Q1  03/08/2022    Lipid Screen  03/08/2022      Chief Complaint   Patient presents with    Diabetes    Acute Kidney Injury       1. Have you been to the ER, urgent care clinic since your last visit? Hospitalized since your last visit? No    2. Have you seen or consulted any other health care providers outside of the [address] [place] [address] since your last visit? Include any DEXA scan, mammography  or colon screening. No    3. Do you have an Advance Directive on file? no    4. Do you have a DNR on file? no    Patient is accompanied by self I have received verbal consent from Kimberlee Randall to discuss any/all medical information while they are present in the room.       Advance Care Planning 9/5/2019   Confirm Advance Directive Yes, not on file         [address], [address] [place] [address]  [address] [facility]  [address] [address]  Phone: [phone] Fax: [phone]    [address] [address], [address]  [address] [place] [address]  Phone: [phone] Fax: [phone]

## 2022-05-18 NOTE — PROGRESS NOTES
Joaquin Hernandez is a 59 y.o. male  who presents for routine immunization(s). Patient denies any symptoms , reactions or allergies that would exclude them from being immunized today. Risks and adverse reactions were discussed. The patient/caregiver was provided the VIS and allotted time to read and ask questions prior to administration of vaccine. Patient voiced full understanding and signed Adult Immunization Consent form. All questions were addressed. Patient was observed for 10 min post injection. There were no reactions observed.

## 2022-05-19 LAB
ALBUMIN SERPL-MCNC: 4.6 G/DL (ref 3.8–4.8)
ALBUMIN/GLOB SERPL: 1.4 {RATIO} (ref 1.2–2.2)
ALP SERPL-CCNC: 86 IU/L (ref 44–121)
ALT SERPL-CCNC: 20 IU/L (ref 0–44)
AST SERPL-CCNC: 21 IU/L (ref 0–40)
BILIRUB SERPL-MCNC: 0.3 MG/DL (ref 0–1.2)
BUN SERPL-MCNC: 14 MG/DL (ref 8–27)
BUN/CREAT SERPL: 14 (ref 10–24)
CALCIUM SERPL-MCNC: 9.5 MG/DL (ref 8.6–10.2)
CHLORIDE SERPL-SCNC: 104 MMOL/L (ref 96–106)
CO2 SERPL-SCNC: 21 MMOL/L (ref 20–29)
CREAT SERPL-MCNC: 0.98 MG/DL (ref 0.76–1.27)
EGFR: 87 ML/MIN/1.73
EST. AVERAGE GLUCOSE BLD GHB EST-MCNC: 151 MG/DL
GLOBULIN SER CALC-MCNC: 3.4 G/DL (ref 1.5–4.5)
GLUCOSE SERPL-MCNC: 105 MG/DL (ref 65–99)
HBA1C MFR BLD: 6.9 % (ref 4.8–5.6)
POTASSIUM SERPL-SCNC: 4.3 MMOL/L (ref 3.5–5.2)
PROT SERPL-MCNC: 8 G/DL (ref 6–8.5)
SODIUM SERPL-SCNC: 142 MMOL/L (ref 134–144)

## 2022-06-21 DIAGNOSIS — E11.9 TYPE 2 DIABETES MELLITUS WITHOUT COMPLICATION, WITHOUT LONG-TERM CURRENT USE OF INSULIN (HCC): ICD-10-CM

## 2022-06-21 RX ORDER — GLIMEPIRIDE 2 MG/1
TABLET ORAL
Qty: 180 TABLET | Refills: 0 | Status: SHIPPED | OUTPATIENT
Start: 2022-06-21

## 2022-07-07 ENCOUNTER — HOSPITAL ENCOUNTER (EMERGENCY)
Age: 62
Discharge: HOME | End: 2022-07-07
Attending: EMERGENCY MEDICINE
Payer: MEDICAID

## 2022-07-07 VITALS
SYSTOLIC BLOOD PRESSURE: 125 MMHG | BODY MASS INDEX: 25.73 KG/M2 | TEMPERATURE: 98 F | DIASTOLIC BLOOD PRESSURE: 67 MMHG | OXYGEN SATURATION: 97 % | HEIGHT: 72 IN | WEIGHT: 190 LBS | HEART RATE: 90 BPM | RESPIRATION RATE: 18 BRPM

## 2022-07-07 DIAGNOSIS — L23.7 POISON IVY: ICD-10-CM

## 2022-07-07 DIAGNOSIS — Z72.0 TOBACCO ABUSE: ICD-10-CM

## 2022-07-07 DIAGNOSIS — R21 RASH AND OTHER NONSPECIFIC SKIN ERUPTION: Primary | ICD-10-CM

## 2022-07-07 DIAGNOSIS — L23.9 ALLERGIC CONTACT DERMATITIS, UNSPECIFIED TRIGGER: ICD-10-CM

## 2022-07-07 PROCEDURE — 74011250637 HC RX REV CODE- 250/637: Performed by: EMERGENCY MEDICINE

## 2022-07-07 PROCEDURE — 74011636637 HC RX REV CODE- 636/637: Performed by: EMERGENCY MEDICINE

## 2022-07-07 PROCEDURE — 99283 EMERGENCY DEPT VISIT LOW MDM: CPT

## 2022-07-07 RX ORDER — DIPHENHYDRAMINE HCL 25 MG
50 CAPSULE ORAL
Status: COMPLETED | OUTPATIENT
Start: 2022-07-07 | End: 2022-07-07

## 2022-07-07 RX ORDER — HYDROCORTISONE 0.5 %
OINTMENT (GRAM) TOPICAL 3 TIMES DAILY
Qty: 56 G | Refills: 0 | Status: SHIPPED | OUTPATIENT
Start: 2022-07-07 | End: 2022-07-17

## 2022-07-07 RX ORDER — DIPHENHYDRAMINE HCL 25 MG
50 CAPSULE ORAL
Status: DISCONTINUED | OUTPATIENT
Start: 2022-07-07 | End: 2022-07-07

## 2022-07-07 RX ORDER — PREDNISONE 20 MG/1
60 TABLET ORAL ONCE
Status: COMPLETED | OUTPATIENT
Start: 2022-07-07 | End: 2022-07-07

## 2022-07-07 RX ORDER — DIPHENHYDRAMINE HCL 25 MG
50 CAPSULE ORAL
Qty: 20 CAPSULE | Refills: 0 | Status: SHIPPED | OUTPATIENT
Start: 2022-07-07 | End: 2022-07-17

## 2022-07-07 RX ORDER — PREDNISONE 50 MG/1
50 TABLET ORAL DAILY
Qty: 5 TABLET | Refills: 0 | Status: SHIPPED | OUTPATIENT
Start: 2022-07-07 | End: 2022-07-12

## 2022-07-07 RX ADMIN — PREDNISONE 60 MG: 20 TABLET ORAL at 22:21

## 2022-07-07 RX ADMIN — DIPHENHYDRAMINE HYDROCHLORIDE 50 MG: 25 CAPSULE ORAL at 22:21

## 2022-07-08 NOTE — ED PROVIDER NOTES
EMERGENCY DEPARTMENT HISTORY AND PHYSICAL EXAM      Please note that this dictation was completed with the assistance of \"Dragon\", the computer voice recognition software. Quite often unanticipated grammatical, syntax, homophones, and other interpretive errors are inadvertently transcribed by the computer software. Please disregard these errors and any errors that have escaped final proofreading. Thank you. Date: 07/07/22  Patient: Emmanuel Mccarthy  Patient Age and Sex: 59 y.o. male   MRN: [id number]  CSN: [id number]    History of Presenting Illness     Chief Complaint   Patient presents with    Skin Problem     posion ivy     History Provided By: Patient/family/EMS (if applicable)    HPI: Emmanuel Mccarthy, 59 y.o. male with past medical history as documented below presents to the ED with c/o of pruritic rash involving the both arms and hands for the past day. Patient notes that he was outside doing some yard work and is concerned about possible poison ivy exposure. Patient notes a prior history of the same and states that the rash resolved after steroids. Patient denies any new soaps, detergents, lotions or medications. There has been no throat swelling, voice changes or shortness of breath. He has taken no medications yet for symptoms. Pt denies any other exacerbating or ameliorating factors. Additionally, pt specifically denies any recent fever, chills, headache, nausea, vomiting, abdominal pain, CP, SOB, lightheadedness, dizziness, numbness, weakness, lower extremity swelling, heart palpitations, urinary sxs, diarrhea, constipation, melena, hematochezia, cough, or congestion. There are no other complaints, changes or physical findings pertinent to the HPI at this time.     PCP: Elaina Haas MD  Past History   Past Medical History:  Past Medical History:   Diagnosis Date    Arthritis     Diabetes ([facility] [address]     Eczema 7/11/2012    Hypercholesterolemia     Ill-defined condition     HLD       Past oriented to person, place and time , normal sensation , short and long term memory intact Surgical History:  Past Surgical History:   Procedure Laterality Date    COLONOSCOPY N/A 1/14/2020    COLONOSCOPY performed by Meg uRbio MD at [facility] ENDOSCOPY    HX ORTHOPAEDIC      L knee scope    HX ORTHOPAEDIC      R pinky finger        Family History:   Family history reviewed and was non-contributory, unless specified below:  Family History   Problem Relation Age of Onset    Hypertension Mother     OSTEOARTHRITIS Mother     No Known Problems Father     Diabetes Sister        Social History:  Social History     Tobacco Use    Smoking status: Current Every Day Smoker     Packs/day: 0.50    Smokeless tobacco: Never Used   Vaping Use    Vaping Use: Never used   Substance Use Topics    Alcohol use: Yes     Alcohol/week: 10.0 - 11.0 standard drinks     Types: 3 Cans of beer, 2 - 3 Shots of liquor, 5 Standard drinks or equivalent per week     Comment: daily    30 - 35 drinks weekly    Drug use: Not Currently     Types: Cocaine, Heroin     Comment: remote hx heroine and cocaine       Allergies: Allergies   Allergen Reactions    Doxycycline Itching    Pcn [Penicillins] Hives       Current Medications:  No current facility-administered medications on file prior to encounter. Current Outpatient Medications on File Prior to Encounter   Medication Sig Dispense Refill    glimepiride (AMARYL) 2 mg tablet Take 1 tablet by mouth twice daily 180 Tablet 0    thiamine mononitrate (B-1) 100 mg tablet Take 1 Tablet by mouth daily. 30 Tablet 5    folic acid (FOLVITE) 1 mg tablet Take 1 Tablet by mouth daily. 30 Tablet 5    lisinopriL (PRINIVIL, ZESTRIL) 2.5 mg tablet Take 1 Tablet by mouth daily. 90 Tablet 1    SITagliptin (Januvia) 50 mg tablet Take 1 Tablet by mouth daily. 30 Tablet 5    ascorbic acid, vitamin C, (VITAMIN C) 500 mg tablet Take 1 Tablet by mouth daily.  (Patient not taking: Reported on 5/18/2022) 30 Tablet 0    cholecalciferol (VITAMIN D3) (1000 Units /25 mcg) tablet Take 1 Tablet by mouth daily. (Patient not taking: Reported on 5/18/2022) 30 Tablet 2    polyethylene glycol (Miralax) 17 gram/dose powder Take 17 g by mouth daily. 1 tablespoon with 8 oz of water daily (Patient not taking: Reported on 5/18/2022) 289 g 0    diclofenac (VOLTAREN) 1 % gel Apply  to affected area two (2) times daily as needed for Pain. 100 g 3    metFORMIN (GLUCOPHAGE) 1,000 mg tablet Take 1 Tablet by mouth two (2) times daily (with meals). 60 Tablet 6    glucose blood VI test strips (blood glucose test) strip Check BG 1-2 times/day 100 Strip 11    Blood-Glucose Meter (ReliOn All-In-One Meter) monitoring kit Check BG 1-2 times/day 1 Kit 0    lancets misc Check BS  Each 11    acetaminophen (TYLENOL) 325 mg tablet Take 2 Tabs by mouth three (3) times daily as needed for Pain. 60 Tab 2    multivitamin, tx-iron-ca-min (THERA-M W/ IRON) 9 mg iron-400 mcg tab tablet Take 1 Tab by mouth daily. (Patient not taking: Reported on 5/18/2022) 30 Tab 0     Review of Systems   A complete ROS was reviewed by me today and all other systems negative, unless otherwise specified below:  Review of Systems   Constitutional: Negative. Negative for chills and fever. HENT: Negative. Negative for congestion and sore throat. Eyes: Negative. Respiratory: Negative. Negative for cough, chest tightness, shortness of breath and wheezing. Cardiovascular: Negative. Negative for chest pain, palpitations and leg swelling. Gastrointestinal: Negative. Negative for abdominal distention, abdominal pain, blood in stool, constipation, diarrhea, nausea and vomiting. Endocrine: Negative. Genitourinary: Negative. Negative for difficulty urinating, dysuria, flank pain, frequency, hematuria and urgency. Musculoskeletal: Negative. Negative for back pain and neck stiffness. Skin: Positive for rash. Allergic/Immunologic: Negative. Neurological: Negative.   Negative for dizziness, syncope, weakness, light-headedness, numbness and headaches. Hematological: Negative. Psychiatric/Behavioral: Negative. Negative for confusion and self-injury. Physical Exam   Physical Exam  Vitals and nursing note reviewed. Constitutional:       Appearance: He is well-developed. He is not toxic-appearing. HENT:      Head: Normocephalic and atraumatic. Mouth/Throat:      Pharynx: No posterior oropharyngeal erythema. Eyes:      Conjunctiva/sclera: Conjunctivae normal.   Cardiovascular:      Rate and Rhythm: Normal rate and regular rhythm. Heart sounds: Normal heart sounds. No murmur heard. No friction rub. No gallop. Pulmonary:      Effort: Pulmonary effort is normal. No respiratory distress. Breath sounds: Normal breath sounds. No wheezing or rales. Chest:      Chest wall: No tenderness. Abdominal:      General: Bowel sounds are normal. There is no distension. Palpations: Abdomen is soft. There is no mass. Tenderness: There is no abdominal tenderness. There is no guarding or rebound. Musculoskeletal:         General: Normal range of motion. Cervical back: Normal range of motion. Skin:     General: Skin is warm. Findings: Rash (Linear raised macular papular rash involving scattered areas of the both arms. No bullae, vesicles. No mucosal involvement. No warmth or erythema or drainage noted.) present. Neurological:      General: No focal deficit present. Mental Status: He is alert and oriented to person, place, and time. Motor: No abnormal muscle tone. Psychiatric:         Behavior: Behavior is cooperative. Diagnostic Study Results     Laboratory Data  I have personally reviewed and interpreted all available laboratory results. No results found for this or any previous visit (from the past 24 hour(s)). Radiologic Studies   I have personally reviewed and interpreted all available imaging studies and agree with radiology interpretation.   No orders to display     CT Results  (Last 48 hours)    None        CXR Results  (Last 48 hours)    None        Medical Decision Making   I am the first and primary ED physician for this patient's ED visit today. I reviewed our electronic medical record system for any past medical records that may contribute to the patient's current condition, including their past medical history, surgical history, social and family history. This also includes their most recent ED visits, previous hospitalizations and prior diagnostic data. I have reviewed and summarized the most pertinent findings in my HPI and MDM. Vital Signs Reviewed:  Patient Vitals for the past 24 hrs:   Temp Pulse Resp BP SpO2   07/07/22 2021 98 °F (36.7 °C) 90 18 125/67 97 %     Pulse Oximetry Analysis: 97% on RA    Cardiac Monitor:   Rate: 90 bpm  The cardiac monitor revealed the following rhythm as interpreted by me: Normal Sinus Rhythm  Cardiac monitoring was ordered to monitor patient for signs of cardiac dysrhythmia, which they are at risk for based on their history and/or risk for cardiovascular disease and/or metabolic abnormalities. Records Reviewed: Nursing Notes, Old Medical Records, Previous electrocardiograms, Previous Radiology Studies and Previous Laboratory Studies, EMS reports    Provider Notes (Medical Decision Making):   Patient presents with rash; rash and clinical picture not worrisome for scabies, measles, meningococcemia, varicella, bullous disorder, Doan-Deep syndrome, Toxic epidermal necrolysis, staph scalded skin syndrome, toxic shock syndrome, secondary syphilis, or disseminated herpes. Stable vitals and without constitutional symptoms. No mucosal involvement. DDx: allergic reaction, contact dermatitis, viral exanthem, staph infection. Will treat with antihistamines, steroids. No indication for EpiPen at this time. Will refer to PCP and Allergist PRN.     Instructions given to patient to use over the counter benadryl 50mg every 6 hours until the rash resolves. Please also take Steroids as prescribed for next few days and Pepcid twice a day as prescribed. Call if symptoms persist or worsen. If you have shortness of breath or severe lip/tongue swelling, return to the ER immediately. ED Course:   Initial assessment performed. I discussed presenting problems and concerns, and my formulated plan for today's visit with the patient and any available family members. I have encouraged them to ask questions as they arise throughout the visit. Social History     Tobacco Use    Smoking status: Current Every Day Smoker     Packs/day: 0.50    Smokeless tobacco: Never Used   Vaping Use    Vaping Use: Never used   Substance Use Topics    Alcohol use: Yes     Alcohol/week: 10.0 - 11.0 standard drinks     Types: 3 Cans of beer, 2 - 3 Shots of liquor, 5 Standard drinks or equivalent per week     Comment: daily    30 - 35 drinks weekly    Drug use: Not Currently     Types: Cocaine, Heroin     Comment: remote hx heroine and cocaine     TOBACCO COUNSELING:  Upon evaluation, pt expressed that they are a current tobacco user. For approximately 3-5 mins, pt has been counseled on the dangers of smoking and was encouraged to quit as soon as possible in order to decrease further risks to their health. Pt has conveyed their understanding of the risks involved should they continue to use tobacco products.     ED Orders Placed:  Orders Placed This Encounter    diphenhydrAMINE (BENADRYL) capsule 50 mg    DISCONTD: diphenhydrAMINE (BENADRYL) capsule 50 mg    diphenhydrAMINE (BenadryL) 25 mg capsule    predniSONE (DELTASONE) 50 mg tablet    hydrocortisone (CORTIZONE) 0.5 % ointment    predniSONE (DELTASONE) tablet 60 mg       ED Medications Administered During ED Course:  Medications   diphenhydrAMINE (BENADRYL) capsule 50 mg (has no administration in time range)   predniSONE (DELTASONE) tablet 60 mg (has no administration in time range)        Progress Note:  I have just re-evaluated the patient. Patient reports improvement of sx's. I have reviewed His vital signs and determined there is currently no worsening in their condition or physical exam. Results have been reviewed with them and their questions have been answered. We will continue to review further results as they come available. Progress Note:  Pt reassessed and symptoms noted to have improved significantly after ED treatment. Pt is clinically stable for discharge. Saad Rodriguez's labs and imaging have been reviewed with him and available family. He verbally conveys understanding and agreement of the signs, symptoms, diagnosis, treatment and prognosis and additionally agrees to follow up as recommended with Dr. Karri Marcus MD and/or specialist as instructed. He agrees with the care plan we have created and conveys that all of his questions have been answered. Additionally, I have put together a packet of discharge instructions for him that include: 1) educational information regarding their diagnosis, 2) how to care for their diagnosis at home, as well a 3) list of reasons why they would want to return to the ED prior to their follow-up appointment should the patient's condition change or symptoms worsen. I have answered all questions to the patient's satisfaction. Strict return precautions given. He conveyed understanding and agreement with care plan. Vital signs stable for discharge. Disposition:  DISCHARGE  The pt is ready for discharge. The pt's signs, symptoms, diagnosis, and discharge instructions have been discussed and pt has conveyed their understanding. The pt is to follow up as recommended or return to ER should their symptoms worsen. Plan has been discussed and pt is in agreement. Plan:  1. Return precautions as discussed with patient and available family/caregiver.      2.   Current Discharge Medication List      START taking these medications    Details   diphenhydrAMINE (BenadryL) 25 mg capsule Take 2 Capsules by mouth every six (6) hours as needed for Allergies for up to 10 days. Qty: 20 Capsule, Refills: 0  Start date: 7/7/2022, End date: 7/17/2022      predniSONE (DELTASONE) 50 mg tablet Take 1 Tablet by mouth daily for 5 days. Qty: 5 Tablet, Refills: 0  Start date: 7/7/2022, End date: 7/12/2022      hydrocortisone (CORTIZONE) 0.5 % ointment Apply  to affected area three (3) times daily for 10 days. Apply to affected area  Qty: 56 g, Refills: 0  Start date: 7/7/2022, End date: 7/17/2022           3. Follow-up Information     Follow up With Specialties Details Why [address] [place] [address] - [place] [facility] Emergency Medicine  If symptoms worsen, As needed Tiff Terry        Instructed to return to ED if worse  Diagnosis   Clinical Impression:  1. Rash and other nonspecific skin eruption    2. Allergic contact dermatitis, unspecified trigger    3. Poison ivy    4. Tobacco abuse      Attestation:  Alex Felder MD, am the attending of record for this patient. I personally performed the services described in this documentation on this date, 7/7/2022 for patient, No Wetzel. I have reviewed the chart and verified that the record is accurate and complete.

## 2022-07-08 NOTE — ED NOTES
Emergency Department Nursing Plan of Care       The Nursing Plan of Care is developed from the Nursing assessment and Emergency Department Attending provider initial evaluation. The plan of care may be reviewed in the ED Provider note.     The Plan of Care was developed with the following considerations:   Patient / Family readiness to learn indicated by:verbalized understanding  Persons(s) to be included in education: patient  Barriers to Learning/Limitations:No    Signed     Abe Shahid RN    7/7/2022   10:18 PM

## 2022-07-08 NOTE — DISCHARGE INSTRUCTIONS
Thank You! It was a pleasure taking care of you in our Emergency Department today. We know that when you come to [address] [place] [address], you are entrusting us with your health, comfort, and safety. Our physicians and nurses honor that trust, and truly appreciate the opportunity to care for you and your loved ones. We also value your feedback. If you receive a survey about your Emergency Department experience today, please fill it out. We care about our patients' feedback, and we listen to what you have to say. Thank you. Dr. Justin Pereyra M.D.      ____________________________________________________________________  I have included a copy of your lab results and/or radiologic studies from today's visit so you can have them easily available at your follow-up visit. We hope you feel better and please do not hesitate to contact the ED if you have any questions at all! No results found for this or any previous visit (from the past 12 hour(s)). No orders to display     CT Results  (Last 48 hours)      None          The exam and treatment you received in the Emergency Department were for an urgent problem and are not intended as complete care. It is important that you follow up with a doctor, nurse practitioner, or physician assistant for ongoing care. If your symptoms become worse or you do not improve as expected and you are unable to reach your usual health care provider, you should return to the Emergency Department. We are available 24 hours a day. Please take your discharge instructions with you when you go to your follow-up appointment. If a prescription has been provided, please have it filled as soon as possible to prevent a delay in treatment. Read the entire medication instruction sheet provided to you by the pharmacy.  If you have any questions or reservations about taking the medication due to side effects or interactions with other medications, please call your primary care physician or contact the ER to speak with the charge nurse. Please make an appointment with your family doctor or the physician you were referred to for follow-up of this visit as instructed on your discharge paperwork. Return to the ER if you are unable to be seen or if you are unable to be seen in a timely manner. If you have any problem arranging the follow-up visit, contact the Emergency Department immediately.

## 2022-07-08 NOTE — ED TRIAGE NOTES
Contact w/ poison ivy to upper arms and hands x 1 day. Pt reports itching w/ Patches of red irritated skin.

## 2022-10-04 DIAGNOSIS — E11.9 TYPE 2 DIABETES MELLITUS WITHOUT COMPLICATION, WITHOUT LONG-TERM CURRENT USE OF INSULIN (HCC): ICD-10-CM

## 2022-10-04 DIAGNOSIS — I10 ESSENTIAL HYPERTENSION: ICD-10-CM

## 2022-10-04 RX ORDER — METFORMIN HYDROCHLORIDE 1000 MG/1
1000 TABLET ORAL 2 TIMES DAILY WITH MEALS
Qty: 60 TABLET | Refills: 6 | Status: SHIPPED | OUTPATIENT
Start: 2022-10-04

## 2022-10-04 RX ORDER — LISINOPRIL 2.5 MG/1
2.5 TABLET ORAL DAILY
Qty: 90 TABLET | Refills: 1 | Status: SHIPPED | OUTPATIENT
Start: 2022-10-04

## 2022-10-04 NOTE — TELEPHONE ENCOUNTER
Lov:5/18/22  Pt needs a new glucose meter and kit to check his blood sugar. Pt needs an easy to use meter/ easy to read. Please advise.

## 2022-11-30 DIAGNOSIS — E11.9 TYPE 2 DIABETES MELLITUS WITHOUT COMPLICATION, WITHOUT LONG-TERM CURRENT USE OF INSULIN (HCC): ICD-10-CM

## 2022-11-30 RX ORDER — SITAGLIPTIN 50 MG/1
TABLET, FILM COATED ORAL
Qty: 30 TABLET | Refills: 0 | Status: SHIPPED | OUTPATIENT
Start: 2022-11-30

## 2022-12-13 ENCOUNTER — VIRTUAL VISIT (OUTPATIENT)
Dept: INTERNAL MEDICINE CLINIC | Age: 62
End: 2022-12-13
Payer: MEDICAID

## 2022-12-13 DIAGNOSIS — F10.10 ETOH ABUSE: ICD-10-CM

## 2022-12-13 DIAGNOSIS — E55.9 VITAMIN D DEFICIENCY: ICD-10-CM

## 2022-12-13 DIAGNOSIS — E11.9 TYPE 2 DIABETES MELLITUS WITHOUT COMPLICATION, WITHOUT LONG-TERM CURRENT USE OF INSULIN (HCC): ICD-10-CM

## 2022-12-13 DIAGNOSIS — M47.896 OTHER OSTEOARTHRITIS OF SPINE, LUMBAR REGION: ICD-10-CM

## 2022-12-13 DIAGNOSIS — E11.21 TYPE 2 DIABETES WITH NEPHROPATHY (HCC): Primary | ICD-10-CM

## 2022-12-13 DIAGNOSIS — M17.11 PRIMARY OSTEOARTHRITIS OF RIGHT KNEE: ICD-10-CM

## 2022-12-13 PROBLEM — E78.5 HYPERLIPIDEMIA ASSOCIATED WITH TYPE 2 DIABETES MELLITUS (HCC): Status: ACTIVE | Noted: 2022-07-22

## 2022-12-13 PROBLEM — E11.69 HYPERLIPIDEMIA ASSOCIATED WITH TYPE 2 DIABETES MELLITUS (HCC): Status: ACTIVE | Noted: 2022-07-22

## 2022-12-13 PROCEDURE — 99214 OFFICE O/P EST MOD 30 MIN: CPT | Performed by: INTERNAL MEDICINE

## 2022-12-13 RX ORDER — ASPIRIN 325 MG/1
100 TABLET, FILM COATED ORAL DAILY
Qty: 30 TABLET | Refills: 5 | Status: SHIPPED | OUTPATIENT
Start: 2022-12-13

## 2022-12-13 RX ORDER — MELOXICAM 7.5 MG/1
TABLET ORAL
Qty: 45 TABLET | Refills: 3 | Status: SHIPPED | OUTPATIENT
Start: 2022-12-13

## 2022-12-13 RX ORDER — FOLIC ACID 1 MG/1
1 TABLET ORAL DAILY
Qty: 30 TABLET | Refills: 5 | Status: SHIPPED | OUTPATIENT
Start: 2022-12-13

## 2022-12-13 RX ORDER — GLIMEPIRIDE 2 MG/1
2 TABLET ORAL 2 TIMES DAILY
Qty: 180 TABLET | Refills: 1 | Status: SHIPPED | OUTPATIENT
Start: 2022-12-13

## 2022-12-13 RX ORDER — DICLOFENAC SODIUM 10 MG/G
GEL TOPICAL
Qty: 100 G | Refills: 3 | Status: SHIPPED | OUTPATIENT
Start: 2022-12-13

## 2022-12-13 NOTE — PROGRESS NOTES
Aristides Mahoney is a 57 y.o. male who was seen by synchronous (real-time) audio-video technology on 12/13/2022 for Diabetes, Cholesterol Problem, Osteoarthritis, and Follow Up Chronic Condition        Assessment & Plan:   Diagnoses and all orders for this visit:    1. Type 2 diabetes with nephropathy (HCC)  Last A1c was stable but he did not do blood work for long time. On metformin, Januvia and glimepiride. Will refer,    -     REFERRAL TO OPHTHALMOLOGY  -     CBC WITH AUTOMATED DIFF  -     METABOLIC PANEL, COMPREHENSIVE  -     LIPID PANEL  -     MICROALBUMIN, UR, RAND W/ MICROALB/CREAT RATIO  -     HEMOGLOBIN A1C WITH EAG    2. ETOH abuse  he is still drinking alcohol. I have discussed with him that ongoing alcohol intake can exhaust vitamins and he can have neurological deficit. Patient verbalized understanding and trying to quit. -     thiamine mononitrate (B-1) 100 mg tablet; Take 1 Tablet by mouth daily. -     folic acid (FOLVITE) 1 mg tablet; Take 1 Tablet by mouth daily. 3. Primary osteoarthritis of right knee    Has DJD. We will give,  -     diclofenac (VOLTAREN) 1 % gel; Apply  to affected area two (2) times daily as needed for Pain. 4. Type 2 diabetes mellitus without complication, without long-term current use of insulin (HCC)  -     glimepiride (AMARYL) 2 mg tablet; Take 1 Tablet by mouth two (2) times a day. -     SITagliptin phosphate (Januvia) 50 mg tablet; Take 1 Tablet by mouth daily.  -     CBC WITH AUTOMATED DIFF  -     METABOLIC PANEL, COMPREHENSIVE    5. Vitamin D deficiency  -     VITAMIN D, 25 HYDROXY    6. Other osteoarthritis of spine, lumbar region    Already have multilevel DJD in the neck and now started having lower back pain with moderate intensity. Possible lower back DJD. We did not do any x-ray of the lower back yet. We will start him on,  -     meloxicam (MOBIC) 7.5 mg tablet; 1 to 2 tab po every day with food.       I spent at least 30 minutes on this visit with this established patient. Subjective:   Mr. Maria Fernanda Lepe is here for follow-up. Report lower back pain almost every single day. He already have multilevel DJD in neck area. No fall or injury. No numbness or weakness in the legs. Pain is not radiating to both thighs. Also suffer from both knee pain from DJD. Using diclofenac gel sometimes. Needed refill. Is diabetic, on multiple medication. Checking blood sugar fasting is average somewhere from 1 19-1 38. Did not do lab work for long time. Need eye checkup. He drinks alcohol, trying to cut down. Taking vitamins for it. Need med refill. Colonoscopy up-to-date    Prior to Admission medications    Medication Sig Start Date End Date Taking? Authorizing Provider   thiamine mononitrate (B-1) 100 mg tablet Take 1 Tablet by mouth daily. 12/13/22  Yes Natalya Tom MD   folic acid (FOLVITE) 1 mg tablet Take 1 Tablet by mouth daily. 12/13/22  Yes Natalya Tom MD   diclofenac (VOLTAREN) 1 % gel Apply  to affected area two (2) times daily as needed for Pain. 12/13/22  Yes Natalya Tom MD   glimepiride (AMARYL) 2 mg tablet Take 1 Tablet by mouth two (2) times a day. 12/13/22  Yes Natalya Tom MD   SITagliptin phosphate (Januvia) 50 mg tablet Take 1 Tablet by mouth daily. 12/13/22  Yes Natalya Tom MD   meloxicam (MOBIC) 7.5 mg tablet 1 to 2 tab po every day with food. 12/13/22  Yes Natalya Tom MD   metFORMIN (GLUCOPHAGE) 1,000 mg tablet Take 1 Tablet by mouth two (2) times daily (with meals). 10/4/22  Yes Natalya Tom MD   lisinopriL (PRINIVIL, ZESTRIL) 2.5 mg tablet Take 1 Tablet by mouth daily. 10/4/22  Yes Natalya Tom MD   polyethylene glycol (Miralax) 17 gram/dose powder Take 17 g by mouth daily.  1 tablespoon with 8 oz of water daily 1/10/22  Yes Pancho Agarwal MD   glucose blood VI test strips (blood glucose test) strip Check BG 1-2 times/day 9/27/21  Yes Natalya Tom MD   Blood-Glucose Meter (ReliOn All-In-One Meter) monitoring kit Check BG 1-2 times/day 9/27/21 Yes Saad Atkins MD   lancets misc Check BS BID 9/27/21  Yes Saad Atkins MD   acetaminophen (TYLENOL) 325 mg tablet Take 2 Tabs by mouth three (3) times daily as needed for Pain. 11/6/19  Yes Saad Atkins MD   multivitamin, tx-iron-ca-min (THERA-M W/ IRON) 9 mg iron-400 mcg tab tablet Take 1 Tablet by mouth daily. 9/7/19  Yes July Burnham NP   Januvia 50 mg tablet Take 1 tablet by mouth once daily 11/30/22 12/13/22  Saad Atkins MD   [facility]) 2 mg tablet Take 1 tablet by mouth twice daily 6/21/22 12/13/22  Saad Atkins MD   thiamine mononitrate (B-1) 100 mg tablet Take 1 Tablet by mouth daily. 5/18/22 12/13/22  Saad Aktins MD   folic acid (FOLVITE) 1 mg tablet Take 1 Tablet by mouth daily. 5/18/22 12/13/22  Saad Atkins MD   ascorbic acid, vitamin C, (VITAMIN C) 500 mg tablet Take 1 Tablet by mouth daily. Patient not taking: Reported on 5/18/2022 1/17/22 12/13/22  Saad Atkins MD   cholecalciferol (VITAMIN D3) (1000 Units /25 mcg) tablet Take 1 Tablet by mouth daily. Patient not taking: Reported on 5/18/2022 1/17/22 12/13/22  Saad Atkins MD   diclofenac (VOLTAREN) 1 % gel Apply  to affected area two (2) times daily as needed for Pain. 1/6/22 12/13/22  Saad Atkins MD     Past Medical History:   Diagnosis Date    Arthritis     Diabetes [facility])     Eczema 7/11/2012    Hypercholesterolemia     Ill-defined condition     HLD       ROS significant for lower back pain neck pain and knee pain.     Objective:     Patient-Reported Vitals 12/13/2022   Patient-Reported Weight 190lb   Patient-Reported Height -   Patient-Reported Pulse -   Patient-Reported Temperature -   Patient-Reported SpO2 -            Constitutional: [x] Appears well-developed and well-nourished [x] No apparent distress      [] Abnormal -     Mental status: [x] Alert and awake  [x] Oriented to person/place/time [x] Able to follow commands    [] Abnormal -     Eyes:   EOM    [x]  Normal    [] Abnormal -   Sclera  [x]  Normal    [] Abnormal -          Discharge [x]  None visible   [] Abnormal -     HENT: [x] Normocephalic, atraumatic  [] Abnormal -   [x] Mouth/Throat: Mucous membranes are moist    External Ears [x] Normal  [] Abnormal -    Neck: [x] No visualized mass [] Abnormal -     Pulmonary/Chest: [x] Respiratory effort normal   [x] No visualized signs of difficulty breathing or respiratory distress        [] Abnormal -      Musculoskeletal:   Lower back: Pain and stiffness present. Range of motion restricted. Bilateral knee: Pain and stiffness present. Knee motion restricted. Neurological:        [x] No Facial Asymmetry (Cranial nerve 7 motor function) (limited exam due to video visit)          [x] No gaze palsy        [] Abnormal -          Skin:        [x] No significant exanthematous lesions or discoloration noted on facial skin         [] Abnormal -            Psychiatric:       [x] Normal Affect [] Abnormal -        [x] No Hallucinations    Other pertinent observable physical exam findings:-        We discussed the expected course, resolution and complications of the diagnosis(es) in detail. Medication risks, benefits, costs, interactions, and alternatives were discussed as indicated. I advised him to contact the office if his condition worsens, changes or fails to improve as anticipated. He expressed understanding with the diagnosis(es) and plan. Jagdeep Murders, was evaluated through a synchronous (real-time) audio-video encounter. The patient (or guardian if applicable) is aware that this is a billable service, which includes applicable co-pays. This Virtual Visit was conducted with patient's (and/or legal guardian's) consent. The visit was conducted pursuant to the emergency declaration under the [address] [place] [address] and the [employer] General Act.   Patient identification was verified, and a caregiver was present when appropriate. The patient was located at: Home: [address] [phone]  The provider was located at:  Facility (Appt Department): [address] [place] [address] 7702  Gerardo Cardoso MD

## 2022-12-13 NOTE — PROGRESS NOTES
Chief Complaint   Patient presents with    Diabetes    Cholesterol Problem    Osteoarthritis    Follow Up Chronic Condition

## 2023-03-22 ENCOUNTER — TELEPHONE (OUTPATIENT)
Dept: INTERNAL MEDICINE CLINIC | Age: 63
End: 2023-03-22

## 2023-03-22 NOTE — TELEPHONE ENCOUNTER
Patient stated that he's been trying to reach the office for a few months now to get the name of the provider he went to see for his diabetes. Patient stated he went to this provider years ago and needs to schedule again. Patient wasn't sure of the names listed in recent referrals. Please call patient back to confirm a provider if possible.      Patient was also made aware of his upcoming appointment with Dr. Roxanna Tracey on 04/20/2023

## 2023-03-24 ENCOUNTER — VIRTUAL VISIT (OUTPATIENT)
Dept: INTERNAL MEDICINE CLINIC | Age: 63
End: 2023-03-24

## 2023-03-24 DIAGNOSIS — I10 ESSENTIAL HYPERTENSION: ICD-10-CM

## 2023-03-24 DIAGNOSIS — D12.6 ADENOMATOUS POLYP OF COLON, UNSPECIFIED PART OF COLON: ICD-10-CM

## 2023-03-24 DIAGNOSIS — E55.9 VITAMIN D DEFICIENCY: ICD-10-CM

## 2023-03-24 DIAGNOSIS — Z23 ENCOUNTER FOR IMMUNIZATION: ICD-10-CM

## 2023-03-24 DIAGNOSIS — E11.9 TYPE 2 DIABETES MELLITUS WITHOUT COMPLICATION, WITHOUT LONG-TERM CURRENT USE OF INSULIN (HCC): Primary | ICD-10-CM

## 2023-03-24 DIAGNOSIS — E53.8 B12 DEFICIENCY: ICD-10-CM

## 2023-03-24 DIAGNOSIS — Z12.5 SCREENING FOR PROSTATE CANCER: ICD-10-CM

## 2023-03-24 DIAGNOSIS — F10.10 ETOH ABUSE: ICD-10-CM

## 2023-03-24 NOTE — TELEPHONE ENCOUNTER
Future Appointments   Date Time Provider Tim Salcedo   4/20/2023  9:30 AM Ajay Fortune MD [facility]

## 2023-03-24 NOTE — PROGRESS NOTES
Jose Maria Yanez is a 57 y.o. male who was seen by synchronous (real-time) audio-video technology on 3/24/2023 for Diabetes, Cholesterol Problem, and Medication Question        Assessment & Plan:   Diagnoses and all orders for this visit:    1. Encounter for immunization      -     varicella-zoster recombinant, PF, (SHINGRIX) 50 mcg/0.5 mL susr injection; 0.5 mL by IntraMUSCular route once for 1 dose. 2. Type 2 diabetes mellitus without complication, without long-term current use of insulin ([address]  Home blood sugar results are erratic. And sometimes he gets a blood sugar fasting 160 or 150 when he did not eat anything and sometimes he gets down to 105. Since his machine is probably not working. Has been taking metformin Januvia and Amaryl. Last A1c done long time back. We will do blood work and then decide on medication.    -     METABOLIC PANEL, COMPREHENSIVE  -     MICROALBUMIN, UR, RAND W/ MICROALB/CREAT RATIO  -     HEMOGLOBIN A1C WITH EAG    3. Vitamin D deficiency  We will check,  -     VITAMIN D, 25 HYDROXY    4. Essential hypertension    Stable blood pressure. On lisinopril. Will check,  -     CBC WITH AUTOMATED DIFF  -     METABOLIC PANEL, COMPREHENSIVE  -     LIPID PANEL    5. ETOH abuse  Cut back on her drinking. Recheck B12 level. 6. B12 deficiency  -     VITAMIN B12    7. Screening for prostate cancer    Will order,  -     PSA SCREENING (SCREENING)    8. Adenomatous polyp of colon, unspecified part of colon    Colonoscopy done several years back showed tubular adenoma. He did not get follow-up colonoscopy advised him to call doctor, and get another colonoscopy. -     REFERRAL TO GASTROENTEROLOGY      I spent at least 30 minutes on this visit with this established patient. Subjective:   Ari Jayy is here for follow-up. He has noticed elevated blood sugars most of the days in the morning and also sometimes he will probably low blood sugar when he is not supposed of the blood sugar.   He states that glucometer is working properly. And he is diabetic. Compliant with medication. Depression shortness of breath. Neurological.  Has hypertension taking very low-dose of lisinopril probably renal protection. He drinks alcohol. Trying to cut back on drinking. Need to get B12 checked. Also taking thiamine. Had colon polyp several years back. Follow-up with GI. No anxiety depression. Otherwise doing well. Prior to Admission medications    Medication Sig Start Date End Date Taking? Authorizing Provider   varicella-zoster recombinant, PF, (SHINGRIX) 50 mcg/0.5 mL susr injection 0.5 mL by IntraMUSCular route once for 1 dose. 3/24/23 3/24/23 Yes Kaya Morse MD   thiamine mononitrate (B-1) 100 mg tablet Take 1 Tablet by mouth daily. 12/13/22  Yes Kaya Morse MD   folic acid (FOLVITE) 1 mg tablet Take 1 Tablet by mouth daily. 12/13/22  Yes Kaya Morse MD   diclofenac (VOLTAREN) 1 % gel Apply  to affected area two (2) times daily as needed for Pain. 12/13/22  Yes Kaya Morse MD   glimepiride (AMARYL) 2 mg tablet Take 1 Tablet by mouth two (2) times a day. 12/13/22  Yes Kaya Morse MD   SITagliptin phosphate (Januvia) 50 mg tablet Take 1 Tablet by mouth daily. 12/13/22  Yes Kaya Morse MD   meloxicam (MOBIC) 7.5 mg tablet 1 to 2 tab po every day with food. 12/13/22  Yes Kaya Morse MD   metFORMIN (GLUCOPHAGE) 1,000 mg tablet Take 1 Tablet by mouth two (2) times daily (with meals). 10/4/22  Yes Kaya Morse MD   lisinopriL (PRINIVIL, ZESTRIL) 2.5 mg tablet Take 1 Tablet by mouth daily. 10/4/22  Yes Kaya Morse MD   polyethylene glycol (Miralax) 17 gram/dose powder Take 17 g by mouth daily.  1 tablespoon with 8 oz of water daily 1/10/22  Yes Mario Alberto Warner MD   glucose blood VI test strips (blood glucose test) strip Check BG 1-2 times/day 9/27/21  Yes Kaya Morse MD   Blood-Glucose Meter (ReliOn All-In-One Meter) monitoring kit Check BG 1-2 times/day 9/27/21  Yes Kaya Morse MD   lancets misc Check BS BID 9/27/21  Yes Reji Kendrick MD   acetaminophen (TYLENOL) 325 mg tablet Take 2 Tabs by mouth three (3) times daily as needed for Pain. 11/6/19  Yes Reji Kendrick MD   multivitamin, tx-iron-ca-min (THERA-M W/ IRON) 9 mg iron-400 mcg tab tablet Take 1 Tablet by mouth daily. 9/7/19  Yes Kaylee Burnham NP     Past Medical History:   Diagnosis Date    Arthritis     Diabetes ([address]     Eczema 7/11/2012    Hypercholesterolemia     Ill-defined condition     HLD       ROS significant for abnormal blood sugar reading.     Objective:     Patient-Reported Vitals 3/24/2023   Patient-Reported Weight 192lb   Patient-Reported Height -   Patient-Reported Pulse -   Patient-Reported Temperature -   Patient-Reported SpO2 -            Constitutional: [x] Appears well-developed and well-nourished [x] No apparent distress      [] Abnormal -     Mental status: [x] Alert and awake  [x] Oriented to person/place/time [x] Able to follow commands    [] Abnormal -     Eyes:   EOM    [x]  Normal    [] Abnormal -   Sclera  [x]  Normal    [] Abnormal -          Discharge [x]  None visible   [] Abnormal -     HENT: [x] Normocephalic, atraumatic  [] Abnormal -   [x] Mouth/Throat: Mucous membranes are moist    External Ears [x] Normal  [] Abnormal -    Neck: [x] No visualized mass [] Abnormal -     Pulmonary/Chest: [x] Respiratory effort normal   [x] No visualized signs of difficulty breathing or respiratory distress        [] Abnormal -      Musculoskeletal:   [x] Normal gait with no signs of ataxia         [x] Normal range of motion of neck        [] Abnormal -     Neurological:        [x] No Facial Asymmetry (Cranial nerve 7 motor function) (limited exam due to video visit)          [x] No gaze palsy        [] Abnormal -          Skin:        [x] No significant exanthematous lesions or discoloration noted on facial skin         [] Abnormal -            Psychiatric:       [x] Normal Affect [] Abnormal -        [x] No Hallucinations    Other pertinent observable physical exam findings:-        We discussed the expected course, resolution and complications of the diagnosis(es) in detail. Medication risks, benefits, costs, interactions, and alternatives were discussed as indicated. I advised him to contact the office if his condition worsens, changes or fails to improve as anticipated. He expressed understanding with the diagnosis(es) and plan. Mitra Alarcon was evaluated through a synchronous (real-time) audio-video encounter. The patient (or guardian if applicable) is aware that this is a billable service, which includes applicable co-pays. This Virtual Visit was conducted with patient's (and/or legal guardian's) consent. The visit was conducted pursuant to the emergency declaration under the [address] [place] [address] authority and the [employer] and [email or web address] General Act. Patient identification was verified, and a caregiver was present when appropriate. The patient was located at: Home: [id number] [place] [address] Drive [phone]  The provider was located at:  Facility (Appt Department): [address] [place] [address] 2841  Gerardo Quintero MD

## 2023-03-29 LAB
25(OH)D3+25(OH)D2 SERPL-MCNC: 6.9 NG/ML (ref 30–100)
ALBUMIN SERPL-MCNC: 4.5 G/DL (ref 3.8–4.8)
ALBUMIN/CREAT UR: 6 MG/G CREAT (ref 0–29)
ALBUMIN/GLOB SERPL: 1.4 {RATIO} (ref 1.2–2.2)
ALP SERPL-CCNC: 77 IU/L (ref 44–121)
ALT SERPL-CCNC: 21 IU/L (ref 0–44)
AST SERPL-CCNC: 18 IU/L (ref 0–40)
BASOPHILS # BLD AUTO: 0 X10E3/UL (ref 0–0.2)
BASOPHILS NFR BLD AUTO: 0 %
BILIRUB SERPL-MCNC: 0.3 MG/DL (ref 0–1.2)
BUN SERPL-MCNC: 15 MG/DL (ref 8–27)
BUN/CREAT SERPL: 14 (ref 10–24)
CALCIUM SERPL-MCNC: 9.8 MG/DL (ref 8.6–10.2)
CHLORIDE SERPL-SCNC: 100 MMOL/L (ref 96–106)
CHOLEST SERPL-MCNC: 177 MG/DL (ref 100–199)
CO2 SERPL-SCNC: 23 MMOL/L (ref 20–29)
CREAT SERPL-MCNC: 1.04 MG/DL (ref 0.76–1.27)
CREAT UR-MCNC: 132.5 MG/DL
EGFRCR SERPLBLD CKD-EPI 2021: 80 ML/MIN/1.73
EOSINOPHIL # BLD AUTO: 0.1 X10E3/UL (ref 0–0.4)
EOSINOPHIL NFR BLD AUTO: 1 %
ERYTHROCYTE [DISTWIDTH] IN BLOOD BY AUTOMATED COUNT: 13.6 % (ref 11.6–15.4)
EST. AVERAGE GLUCOSE BLD GHB EST-MCNC: 137 MG/DL
GLOBULIN SER CALC-MCNC: 3.3 G/DL (ref 1.5–4.5)
GLUCOSE SERPL-MCNC: 119 MG/DL (ref 70–99)
HBA1C MFR BLD: 6.4 % (ref 4.8–5.6)
HCT VFR BLD AUTO: 43.5 % (ref 37.5–51)
HDLC SERPL-MCNC: 53 MG/DL
HGB BLD-MCNC: 14.1 G/DL (ref 13–17.7)
IMM GRANULOCYTES # BLD AUTO: 0 X10E3/UL (ref 0–0.1)
IMM GRANULOCYTES NFR BLD AUTO: 0 %
IMP & REVIEW OF LAB RESULTS: NORMAL
LDLC SERPL CALC-MCNC: 101 MG/DL (ref 0–99)
LYMPHOCYTES # BLD AUTO: 0.9 X10E3/UL (ref 0.7–3.1)
LYMPHOCYTES NFR BLD AUTO: 17 %
MCH RBC QN AUTO: 27 PG (ref 26.6–33)
MCHC RBC AUTO-ENTMCNC: 32.4 G/DL (ref 31.5–35.7)
MCV RBC AUTO: 83 FL (ref 79–97)
MICROALBUMIN UR-MCNC: 8.1 UG/ML
MONOCYTES # BLD AUTO: 0.6 X10E3/UL (ref 0.1–0.9)
MONOCYTES NFR BLD AUTO: 11 %
NEUTROPHILS # BLD AUTO: 3.7 X10E3/UL (ref 1.4–7)
NEUTROPHILS NFR BLD AUTO: 71 %
PLATELET # BLD AUTO: 214 X10E3/UL (ref 150–450)
POTASSIUM SERPL-SCNC: 4.9 MMOL/L (ref 3.5–5.2)
PROT SERPL-MCNC: 7.8 G/DL (ref 6–8.5)
PSA SERPL-MCNC: 1.8 NG/ML (ref 0–4)
RBC # BLD AUTO: 5.22 X10E6/UL (ref 4.14–5.8)
SODIUM SERPL-SCNC: 139 MMOL/L (ref 134–144)
TRIGL SERPL-MCNC: 130 MG/DL (ref 0–149)
VIT B12 SERPL-MCNC: 246 PG/ML (ref 232–1245)
VLDLC SERPL CALC-MCNC: 23 MG/DL (ref 5–40)
WBC # BLD AUTO: 5.2 X10E3/UL (ref 3.4–10.8)

## 2023-03-30 ENCOUNTER — TELEPHONE (OUTPATIENT)
Dept: INTERNAL MEDICINE CLINIC | Age: 63
End: 2023-03-30

## 2023-03-30 NOTE — TELEPHONE ENCOUNTER
Bhavna Lundberg was called and verbalized understanding on note below.        Labs not resulted yet but was informed that Vitamin D is very low and A1c is improved, will call once resulted

## 2023-04-04 ENCOUNTER — VIRTUAL VISIT (OUTPATIENT)
Dept: INTERNAL MEDICINE CLINIC | Age: 63
End: 2023-04-04
Payer: MEDICAID

## 2023-04-04 PROCEDURE — 99214 OFFICE O/P EST MOD 30 MIN: CPT | Performed by: INTERNAL MEDICINE

## 2023-04-04 RX ORDER — LANOLIN ALCOHOL/MO/W.PET/CERES
500 CREAM (GRAM) TOPICAL DAILY
Qty: 30 TABLET | Refills: 11 | Status: SHIPPED
Start: 2023-04-04

## 2023-04-04 RX ORDER — TRAMADOL HYDROCHLORIDE 50 MG/1
50 TABLET ORAL
Qty: 30 TABLET | Refills: 0 | Status: SHIPPED
Start: 2023-04-04 | End: 2023-05-04

## 2023-04-04 RX ORDER — ERGOCALCIFEROL 1.25 MG/1
50000 CAPSULE ORAL
Qty: 16 CAPSULE | Refills: 0 | Status: SHIPPED
Start: 2023-04-04

## 2023-04-04 RX ORDER — ERGOCALCIFEROL 1.25 MG/1
50000 CAPSULE ORAL
Qty: 16 CAPSULE | Refills: 0 | Status: SHIPPED
Start: 2023-04-04 | End: 2023-04-04 | Stop reason: SDUPTHER

## 2023-04-04 NOTE — TELEPHONE ENCOUNTER
A1c has improved. vit D level very low.will start on vit D 50,000 unit 1 cap weekly for 4 months. will repeat level in 4 months. adv to be on milk product and expose to sun for 20 min a day. Please call in vitamin D. All other labs are stable. Jonnathan Lina was called and verbalized understanding on note below. Requested Prescriptions     Pending Prescriptions Disp Refills    ergocalciferol (ERGOCALCIFEROL) 1,250 mcg (50,000 unit) capsule 16 Capsule 0     Sig: Take 1 Capsule by mouth every seven (7) days.          3/24/2023 is LAST OFFICE VISIT     Future Appointments   Date Time Provider Tim Salcedo   4/20/2023  9:30 AM Odette Breaux MD [facility] [address], [address] [place] [address] Avenue  [address] [facility]  [address] [place] [address]  Phone: [phone] Fax: [phone]

## 2023-04-04 NOTE — PROGRESS NOTES
Jonnathan Mills is a 57 y.o. male who was seen by synchronous (real-time) audio-video technology on 4/4/2023 for Pain (Chronic), Back Pain, and Knee Pain        Assessment & Plan:   Diagnoses and all orders for this visit:    1. Primary osteoarthritis of both knees    He has advanced osteoarthritis in both knees. He is taking Tylenol and diclofenac gel and meloxicam, not helping him at all. He was told by his orthopedic surgeon Dr. Javier Mare him that he needed knee replacement ASAP. He has lot of family issues, he is not ready to do knee replacement right now. We will give,  -     traMADoL (ULTRAM) 50 mg tablet; Take 1 Tablet by mouth daily as needed for Pain for up to 30 days. #30, no refill. 2. Vitamin D deficiency    Very low vitamin D. Will order,  -     ergocalciferol (ERGOCALCIFEROL) 1,250 mcg (50,000 unit) capsule; Take 1 Capsule by mouth every seven (7) days. 3. B12 deficiency    He is still drinking alcohol to 2 times a week. Advised him to quit drinking. We will give,  -     cyanocobalamin (VITAMIN B12) 500 mcg tablet; Take 1 Tablet by mouth daily. I spent at least 30 minutes on this visit with this established patient. Subjective:     Mr. Pamela Mccoy is complaining about bilateral knee pain. His both knee has more advanced DJD. He was seen by orthopedics and was told to have knee replacement but he has lot of family issues going on right now. He is not ready to have knee replacement right now. Has been taking an meloxicam, Tylenol and diclofenac gel. Not helping him at all. Has a low vitamin D, need vitamin D replacement. He is still drinking alcohol. Drinking 2 times a week. B12 level very low. He is diabetic, A1c stable. Taking medications. Hypertension, compliant with medication. Denies chest pain palpitation or shortness of breath. Need shingles vaccine. Prior to Admission medications    Medication Sig Start Date End Date Taking?  Authorizing Provider   cyanocobalamin (VITAMIN B12) 500 mcg tablet Take 1 Tablet by mouth daily. 4/4/23  Yes Jeff Johnston MD   ergocalciferol (ERGOCALCIFEROL) 1,250 mcg (50,000 unit) capsule Take 1 Capsule by mouth every seven (7) days. 4/4/23  Yes Jeff Johnston MD   traMADoL Abril Lexa) 50 mg tablet Take 1 Tablet by mouth daily as needed for Pain for up to 30 days. 4/4/23 5/4/23 Yes Jeff Johnston MD   ergocalciferol (ERGOCALCIFEROL) 1,250 mcg (50,000 unit) capsule Take 1 Capsule by mouth every seven (7) days. 4/4/23 4/4/23  Jeff Johnston MD   thiamine mononitrate (B-1) 100 mg tablet Take 1 Tablet by mouth daily. 12/13/22   Jeff Johnston MD   folic acid (FOLVITE) 1 mg tablet Take 1 Tablet by mouth daily. 12/13/22   Jeff Johnston MD   diclofenac (VOLTAREN) 1 % gel Apply  to affected area two (2) times daily as needed for Pain. 12/13/22   Jeff Johnston MD   glimepiride (AMARYL) 2 mg tablet Take 1 Tablet by mouth two (2) times a day. 12/13/22   Jeff Johnston MD   SITagliptin phosphate (Januvia) 50 mg tablet Take 1 Tablet by mouth daily. 12/13/22   Jeff Johnston MD   meloxicam (MOBIC) 7.5 mg tablet 1 to 2 tab po every day with food. 12/13/22   Jeff Johnston MD   metFORMIN (GLUCOPHAGE) 1,000 mg tablet Take 1 Tablet by mouth two (2) times daily (with meals). 10/4/22   Jeff Johnston MD   lisinopriL (PRINIVIL, ZESTRIL) 2.5 mg tablet Take 1 Tablet by mouth daily. 10/4/22   Jeff Johnston MD   polyethylene glycol (Miralax) 17 gram/dose powder Take 17 g by mouth daily. 1 tablespoon with 8 oz of water daily 1/10/22   Du Walter MD   glucose blood VI test strips (blood glucose test) strip Check BG 1-2 times/day 9/27/21   Jeff Johnston MD   Blood-Glucose Meter (ReliOn All-In-One Meter) monitoring kit Check BG 1-2 times/day 9/27/21   Jeff Johnston MD   lancets misc Check BS BID 9/27/21   Jeff Johnston MD   acetaminophen (TYLENOL) 325 mg tablet Take 2 Tabs by mouth three (3) times daily as needed for Pain.  11/6/19   Jeff Johnston MD   multivitamin, tx-iron-ca-min (THERA-M W/ IRON) 9 mg iron-400 mcg tab tablet Take 1 Tablet by mouth daily. 9/7/19   Miguelina Burnham NP     Past Medical History:   Diagnosis Date    Arthritis     Diabetes ([address]     Eczema 7/11/2012    Hypercholesterolemia     Ill-defined condition     HLD       ROS significant for knee pain    Objective:     Patient-Reported Vitals 3/24/2023   Patient-Reported Weight 192lb   Patient-Reported Height -   Patient-Reported Pulse -   Patient-Reported Temperature -   Patient-Reported SpO2 -            Constitutional: [x] Appears well-developed and well-nourished [x] No apparent distress      [] Abnormal -     Mental status: [x] Alert and awake  [x] Oriented to person/place/time [x] Able to follow commands    [] Abnormal -     Eyes:   EOM    [x]  Normal    [] Abnormal -   Sclera  [x]  Normal    [] Abnormal -          Discharge [x]  None visible   [] Abnormal -     HENT: [x] Normocephalic, atraumatic  [] Abnormal -   [x] Mouth/Throat: Mucous membranes are moist    External Ears [x] Normal  [] Abnormal -    Neck: [x] No visualized mass [] Abnormal -     Pulmonary/Chest: [x] Respiratory effort normal   [x] No visualized signs of difficulty breathing or respiratory distress        [] Abnormal -      Musculoskeletal: Bilateral knee: Pain and stiffness present. Range of motion restricted. Neurological:        [x] No Facial Asymmetry (Cranial nerve 7 motor function) (limited exam due to video visit)          [x] No gaze palsy        [] Abnormal -          Skin:        [x] No significant exanthematous lesions or discoloration noted on facial skin         [] Abnormal -            Psychiatric:       [x] Normal Affect [] Abnormal -        [x] No Hallucinations    Other pertinent observable physical exam findings:-        We discussed the expected course, resolution and complications of the diagnosis(es) in detail. Medication risks, benefits, costs, interactions, and alternatives were discussed as indicated.   I advised him to contact the office if his condition worsens, changes or fails to improve as anticipated. He expressed understanding with the diagnosis(es) and plan. Sainttolu Meggan, was evaluated through a synchronous (real-time) audio-video encounter. The patient (or guardian if applicable) is aware that this is a billable service, which includes applicable co-pays. This Virtual Visit was conducted with patient's (and/or legal guardian's) consent. The visit was conducted pursuant to the emergency declaration under the [address] [place] [address] authority and the [email or web address] General Act. Patient identification was verified, and a caregiver was present when appropriate. The patient was located at: Home: [address] [address]  The provider was located at:  Facility (Appt Department): [address] [place] [address] 6508  Carrillo Alex Urbano MD

## 2023-04-06 ENCOUNTER — DOCUMENTATION ONLY (OUTPATIENT)
Dept: INTERNAL MEDICINE CLINIC | Age: 63
End: 2023-04-06

## 2023-04-06 NOTE — PROGRESS NOTES
Chief Complaint   Patient presents with    Prior Auth     traMADol HCl 50MG tablets     Deniedon April 5  Your PA request has been denied

## 2023-04-23 ENCOUNTER — APPOINTMENT (OUTPATIENT)
Dept: CT IMAGING | Age: 63
End: 2023-04-23
Attending: EMERGENCY MEDICINE
Payer: MEDICAID

## 2023-04-23 ENCOUNTER — APPOINTMENT (OUTPATIENT)
Dept: GENERAL RADIOLOGY | Age: 63
End: 2023-04-23
Attending: EMERGENCY MEDICINE
Payer: MEDICAID

## 2023-04-23 ENCOUNTER — HOSPITAL ENCOUNTER (EMERGENCY)
Age: 63
Discharge: HOME | End: 2023-04-24
Attending: EMERGENCY MEDICINE
Payer: MEDICAID

## 2023-04-23 DIAGNOSIS — M25.561 ACUTE PAIN OF RIGHT KNEE: ICD-10-CM

## 2023-04-23 DIAGNOSIS — M48.061 SPINAL STENOSIS OF LUMBAR REGION WITHOUT NEUROGENIC CLAUDICATION: ICD-10-CM
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
## 2023-04-23 DIAGNOSIS — M25.562 ACUTE PAIN OF LEFT KNEE: ICD-10-CM

## 2023-04-23 DIAGNOSIS — E55.9 VITAMIN D DEFICIENCY: Primary | ICD-10-CM

## 2023-04-23 DIAGNOSIS — M54.50 ACUTE MIDLINE LOW BACK PAIN WITHOUT SCIATICA: ICD-10-CM

## 2023-04-23 LAB
GLUCOSE BLD STRIP.AUTO-MCNC: 120 MG/DL (ref 65–117)
SERVICE CMNT-IMP: ABNORMAL

## 2023-04-23 PROCEDURE — 96372 THER/PROPH/DIAG INJ SC/IM: CPT

## 2023-04-23 PROCEDURE — 74011250636 HC RX REV CODE- 250/636: Performed by: EMERGENCY MEDICINE

## 2023-04-23 PROCEDURE — 74011250637 HC RX REV CODE- 250/637: Performed by: EMERGENCY MEDICINE

## 2023-04-23 PROCEDURE — 72125 CT NECK SPINE W/O DYE: CPT

## 2023-04-23 PROCEDURE — 82962 GLUCOSE BLOOD TEST: CPT

## 2023-04-23 PROCEDURE — 73562 X-RAY EXAM OF KNEE 3: CPT

## 2023-04-23 PROCEDURE — 72131 CT LUMBAR SPINE W/O DYE: CPT

## 2023-04-23 PROCEDURE — 70450 CT HEAD/BRAIN W/O DYE: CPT

## 2023-04-23 PROCEDURE — 99284 EMERGENCY DEPT VISIT MOD MDM: CPT

## 2023-04-23 PROCEDURE — 73552 X-RAY EXAM OF FEMUR 2/>: CPT

## 2023-04-23 PROCEDURE — 72192 CT PELVIS W/O DYE: CPT

## 2023-04-23 RX ORDER — KETOROLAC TROMETHAMINE 30 MG/ML
30 INJECTION, SOLUTION INTRAMUSCULAR; INTRAVENOUS
Status: COMPLETED | OUTPATIENT
Start: 2023-04-23 | End: 2023-04-23

## 2023-04-23 RX ORDER — OXYCODONE AND ACETAMINOPHEN 5; 325 MG/1; MG/1
1 TABLET ORAL
Status: COMPLETED | OUTPATIENT
Start: 2023-04-23 | End: 2023-04-23

## 2023-04-23 RX ADMIN — KETOROLAC TROMETHAMINE 30 MG: 30 INJECTION, SOLUTION INTRAMUSCULAR at 23:59

## 2023-04-23 RX ADMIN — OXYCODONE HYDROCHLORIDE AND ACETAMINOPHEN 1 TABLET: 5; 325 TABLET ORAL at 23:59

## 2023-04-24 VITALS
TEMPERATURE: 97.8 F | RESPIRATION RATE: 18 BRPM | DIASTOLIC BLOOD PRESSURE: 82 MMHG | SYSTOLIC BLOOD PRESSURE: 145 MMHG | BODY MASS INDEX: 25.73 KG/M2 | HEIGHT: 72 IN | OXYGEN SATURATION: 99 % | WEIGHT: 190 LBS | HEART RATE: 82 BPM

## 2023-04-24 PROCEDURE — 74011000250 HC RX REV CODE- 250: Performed by: EMERGENCY MEDICINE

## 2023-04-24 RX ORDER — OXYCODONE AND ACETAMINOPHEN 5; 325 MG/1; MG/1
1 TABLET ORAL
Qty: 10 TABLET | Refills: 0 | Status: SHIPPED | OUTPATIENT
Start: 2023-04-24 | End: 2023-04-29

## 2023-04-24 RX ORDER — LIDOCAINE 50 MG/G
PATCH TOPICAL
Qty: 15 EACH | Refills: 0 | Status: SHIPPED | OUTPATIENT
Start: 2023-04-24

## 2023-04-24 RX ORDER — LIDOCAINE 4 G/100G
2 PATCH TOPICAL ONCE
Status: DISCONTINUED | OUTPATIENT
Start: 2023-04-24 | End: 2023-04-24 | Stop reason: HOSPADM

## 2023-04-24 RX ORDER — NAPROXEN 500 MG/1
500 TABLET ORAL 2 TIMES DAILY WITH MEALS
Qty: 20 TABLET | Refills: 0 | Status: SHIPPED | OUTPATIENT
Start: 2023-04-24

## 2023-04-24 NOTE — ED NOTES
Patient has been instructed that they have been given oxycodone-acetaminophen which contains opioids, benzodiazepines, or other sedating drugs. Patient is aware that they  will need to refrain from driving or operating heavy machinery after taking this medication. Patient also instructed that they need to avoid drinking alcohol and using other products containing opioids, benzodiazepines, or other sedating drugs. Patient verbalized understanding.

## 2023-04-24 NOTE — DISCHARGE INSTRUCTIONS
Thank You! It was a pleasure taking care of you in our Emergency Department today. We know that when you come to [facility], you are entrusting us with your health, comfort, and safety. Our physicians and nurses honor that trust, and truly appreciate the opportunity to care for you and your loved ones. We also value your feedback. If you receive a survey about your Emergency Department experience today, please fill it out. We care about our patients' feedback, and we listen to what you have to say. Thank you. Dr. Carrie Gonzalez M.D.      ____________________________________________________________________  I have included a copy of your lab results and/or radiologic studies from today's visit so you can have them easily available at your follow-up visit. We hope you feel better and please do not hesitate to contact the ED if you have any questions at all! Recent Results (from the past 12 hour(s))   GLUCOSE, POC    Collection Time: 04/23/23 10:31 PM   Result Value Ref Range    Glucose (POC) 120 (H) 65 - 117 mg/dL    Performed by Roberta Silva RN        CT SPINE CERV WO CONT   Final Result   There is no acute fracture or dislocation identified. CT SPINE LUMB WO CONT         CT PELV WO CONT         CT HEAD WO CONT   Final Result   Normal study      XR KNEE LT 3 V   Final Result   Tricompartmental left knee osteoarthritis. Small joint effusion. XR KNEE RT 3 V   Final Result      No acute process seen         XR FEMUR RT 2 VS   Final Result      No acute process seen           CT Results  (Last 48 hours)                 04/23/23 2326  CT SPINE CERV WO CONT Final result    Impression:  There is no acute fracture or dislocation identified. Narrative:  EXAM: CT SPINE CERV WO CONT   CLINICAL HISTORY: neck pain s/p trauma   INDICATION: neck pain s/p trauma   COMPARISON:  None   TECHNIQUE:  Axial neck CT was performed. Noncontrast imaging obtained.  Coronal and sagittal reconstructions were performed. CT dose reduction was achieved through use of a standardized protocol tailored   for this examination and automatic exposure control for dose modulation. Osseous/bone algorithm was utilized. FINDINGS:   The vertebral bodies are anatomically aligned. There is no evidence of fracture   or subluxation. The prevertebral soft tissues are grossly within normal limits. The atlantodental interval is within normal limits. The craniocervical junction   is intact. There is canal and foraminal stenosis present. No apical pneumothorax. There is   mild reversal cervical lordosis. 04/23/23 2326  CT SPINE LUMB WO CONT Preliminary result      This result has not been signed. Information might be incomplete. Narrative:  *PRELIMINARY REPORT*       No fracture. Nonobstructing renal calculi. Preliminary report was provided by   Dr. Jesu Frank, the on-call radiologist, at 7547 hours       Final report to follow. *END PRELIMINARY REPORT*                               04/23/23 2326  CT PELV WO CONT Preliminary result      This result has not been signed. Information might be incomplete. Narrative:  *PRELIMINARY REPORT*       No fracture. Moderate right L5-S1 neural foraminal stenosis. Preliminary report was provided by Dr. Jesu Frank, the on-call radiologist, at 3784   hours       Final report to follow. *END PRELIMINARY REPORT*                               04/23/23 2326  CT HEAD WO CONT Final result    Impression:  Normal study       Narrative:  EXAM: CT HEAD WO CONT       INDICATION: trauma       COMPARISON: None. CONTRAST: None. TECHNIQUE: Unenhanced CT of the head was performed using 5 mm images. Brain and   bone windows were generated. Coronal and sagittal reformats. CT dose reduction   was achieved through use of a standardized protocol tailored for this   examination and automatic exposure control for dose modulation.          FINDINGS: The ventricles and sulci are normal in size, shape and configuration. There is   no significant white matter disease. There is no intracranial hemorrhage,   extra-axial collection, or mass effect. The basilar cisterns are open. No CT   evidence of acute infarct. The bone windows demonstrate no abnormalities. The visualized portions of the   paranasal sinuses and mastoid air cells are clear. The exam and treatment you received in the Emergency Department were for an urgent problem and are not intended as complete care. It is important that you follow up with a doctor, nurse practitioner, or physician assistant for ongoing care. If your symptoms become worse or you do not improve as expected and you are unable to reach your usual health care provider, you should return to the Emergency Department. We are available 24 hours a day. Please take your discharge instructions with you when you go to your follow-up appointment. If a prescription has been provided, please have it filled as soon as possible to prevent a delay in treatment. Read the entire medication instruction sheet provided to you by the pharmacy. If you have any questions or reservations about taking the medication due to side effects or interactions with other medications, please call your primary care physician or contact the ER to speak with the charge nurse. Please make an appointment with your family doctor or the physician you were referred to for follow-up of this visit as instructed on your discharge paperwork. Return to the ER if you are unable to be seen or if you are unable to be seen in a timely manner. If you have any problem arranging the follow-up visit, contact the Emergency Department immediately.

## 2023-04-24 NOTE — ED TRIAGE NOTES
Pt presents ambulatory to ED with c/o buttock and knee pain after falling from ladder on Thursday  Pt denies hitting head, denies LOC  Pt reports hx left knee surgery. Pt reports bilateral knee swelling and bruise to right knee, abrasion to right shin  Pt reports abrasion to upper head from caulk gun hitting head.

## 2023-04-24 NOTE — ED PROVIDER NOTES
EMERGENCY DEPARTMENT HISTORY AND PHYSICAL EXAM            Please note that this dictation was completed with the assistance of \"Dragon\", the computer voice recognition software. Quite often unanticipated grammatical, syntax, homophones, and other interpretive errors are inadvertently transcribed by the computer software. Please disregard these errors and any errors that have escaped final proofreading. Thank you. Date of Evaluation: 04/24/23  Patient: Joseluis Lynch  Patient Age and Sex: 57 y.o. male   MRN: [id number]  CSN: [id number]  PCP: Sarah Alva MD    History of Present Illness     Chief Complaint   Patient presents with    Fall     Pt fell appox 5 feet from ladder on Thursday, landing on buttock     History Provided By: Patient/family/EMS (if available)    HPI Limitations : None    HPI: Joseluis Lynch, 57 y.o. male with past medical history as documented below presents to the ED with c/o of fall from ladder. Patient reports that several days ago he fell approximately 5 feet from a ladder landing on his buttocks. Patient notes mild to mild low back pain. Patient also notes acute on chronic bilateral knee pain. No head injury or LOC. No unilateral numbness weakness. No saddle anesthesia, loss of bladder or bowel dysfunction. . Pt denies any other exacerbating or ameliorating factors. There are no other complaints, changes or physical findings pertinent to the HPI at this time. Nursing Notes were all reviewed and agreed with or any disagreements were addressed in the HPI.     Past History   Past Medical History:  Past Medical History:   Diagnosis Date    Arthritis     Diabetes ([address]     Eczema 7/11/2012    Hypercholesterolemia     Ill-defined condition     HLD       Past Surgical History:  Past Surgical History:   Procedure Laterality Date    COLONOSCOPY N/A 1/14/2020    COLONOSCOPY performed by Ese Moya MD at [facility] ENDOSCOPY    HX ORTHOPAEDIC      L knee scope    HX ORTHOPAEDIC      R cony finger        Family History:   Family history reviewed and was non-contributory, unless specified below:  Family History   Problem Relation Age of Onset    Hypertension Mother     OSTEOARTHRITIS Mother     No Known Problems Father     Diabetes Sister        Social History:  Social History     Tobacco Use    Smoking status: Every Day     Packs/day: 0.50     Types: Cigarettes    Smokeless tobacco: Never   Vaping Use    Vaping Use: Never used   Substance Use Topics    Alcohol use: Yes     Alcohol/week: 10.0 - 11.0 standard drinks     Types: 3 Cans of beer, 2 - 3 Shots of liquor, 5 Standard drinks or equivalent per week     Comment: 3 days a week    Drug use: Not Currently     Types: Cocaine, Heroin     Comment: remote hx heroine and cocaine       Allergies: Allergies   Allergen Reactions    Doxycycline Itching    Pcn [Penicillins] Hives       Current Medications:  No current facility-administered medications on file prior to encounter. Current Outpatient Medications on File Prior to Encounter   Medication Sig Dispense Refill    traMADoL (ULTRAM) 50 mg tablet Take 1 Tablet by mouth daily as needed for Pain for up to 30 days. 30 Tablet 0    SITagliptin phosphate (Januvia) 50 mg tablet Take 1 Tablet by mouth daily. 30 Tablet 5    metFORMIN (GLUCOPHAGE) 1,000 mg tablet Take 1 Tablet by mouth two (2) times daily (with meals). 60 Tablet 6    lisinopriL (PRINIVIL, ZESTRIL) 2.5 mg tablet Take 1 Tablet by mouth daily. 90 Tablet 1    cyanocobalamin (VITAMIN B12) 500 mcg tablet Take 1 Tablet by mouth daily. 30 Tablet 11    ergocalciferol (ERGOCALCIFEROL) 1,250 mcg (50,000 unit) capsule Take 1 Capsule by mouth every seven (7) days. 16 Capsule 0    thiamine mononitrate (B-1) 100 mg tablet Take 1 Tablet by mouth daily. 30 Tablet 5    folic acid (FOLVITE) 1 mg tablet Take 1 Tablet by mouth daily. 30 Tablet 5    diclofenac (VOLTAREN) 1 % gel Apply  to affected area two (2) times daily as needed for Pain.  100 g 3    glimepiride (AMARYL) 2 mg tablet Take 1 Tablet by mouth two (2) times a day. 180 Tablet 1    meloxicam (MOBIC) 7.5 mg tablet 1 to 2 tab po every day with food. 45 Tablet 3    polyethylene glycol (Miralax) 17 gram/dose powder Take 17 g by mouth daily. 1 tablespoon with 8 oz of water daily 289 g 0    glucose blood VI test strips (blood glucose test) strip Check BG 1-2 times/day 100 Strip 11    Blood-Glucose Meter (ReliOn All-In-One Meter) monitoring kit Check BG 1-2 times/day 1 Kit 0    lancets misc Check BS  Each 11    acetaminophen (TYLENOL) 325 mg tablet Take 2 Tabs by mouth three (3) times daily as needed for Pain. 60 Tab 2    multivitamin, tx-iron-ca-min (THERA-M W/ IRON) 9 mg iron-400 mcg tab tablet Take 1 Tablet by mouth daily. 30 Tab 0       Review of Systems   A complete ROS was reviewed by me today and all other systems negative, unless otherwise specified below:  Review of Systems   Constitutional: Negative. Negative for chills and fever. HENT: Negative. Negative for congestion and sore throat. Eyes: Negative. Respiratory: Negative. Negative for cough, chest tightness, shortness of breath and wheezing. Cardiovascular: Negative. Negative for chest pain, palpitations and leg swelling. Gastrointestinal: Negative. Negative for abdominal distention, abdominal pain, blood in stool, constipation, diarrhea, nausea and vomiting. Endocrine: Negative. Genitourinary: Negative. Negative for difficulty urinating, dysuria, flank pain, frequency, hematuria and urgency. Musculoskeletal:  Positive for back pain. Negative for neck stiffness. Skin: Negative. Negative for rash. Allergic/Immunologic: Negative. Neurological: Negative. Negative for dizziness, syncope, weakness, light-headedness, numbness and headaches. Hematological: Negative. Psychiatric/Behavioral: Negative. Negative for confusion and self-injury.     Physical Exam   Patient Vitals for the past 24 hrs:   Temp Pulse Resp BP SpO2 04/24/23 0123 -- -- -- (!) 145/82 --   04/23/23 2234 -- 82 -- (!) 153/81 99 %   04/23/23 2110 97.8 °F (36.6 °C) 90 18 (!) 161/83 99 %       Physical Exam  Vitals and nursing note reviewed. Constitutional:       Appearance: He is well-developed. He is not toxic-appearing. HENT:      Head: Normocephalic and atraumatic. Mouth/Throat:      Pharynx: No posterior oropharyngeal erythema. Eyes:      Conjunctiva/sclera: Conjunctivae normal.      Pupils: Pupils are equal, round, and reactive to light. Cardiovascular:      Rate and Rhythm: Normal rate and regular rhythm. Heart sounds: Normal heart sounds. No murmur heard. No friction rub. No gallop. Pulmonary:      Effort: Pulmonary effort is normal. No respiratory distress. Breath sounds: Normal breath sounds. No wheezing or rales. Chest:      Chest wall: No tenderness. Abdominal:      General: Bowel sounds are normal. There is no distension. Palpations: Abdomen is soft. There is no mass. Tenderness: There is no abdominal tenderness. There is no guarding or rebound. Musculoskeletal:         General: Tenderness present. No deformity. Normal range of motion. Cervical back: Normal range of motion. Comments: Paralumbar tenderness, no midline tenderness step-offs, tenderness to bilateral knees without evidence of trauma, no effusion, no overlying warmth erythema. Skin:     General: Skin is warm. Findings: No rash. Neurological:      Mental Status: He is alert and oriented to person, place, and time. Cranial Nerves: No cranial nerve deficit. Motor: No abnormal muscle tone. Coordination: Coordination normal.      Deep Tendon Reflexes: Reflexes normal.   Psychiatric:         Behavior: Behavior is cooperative. Diagnostic Studies     LABORATORY RESULTS:  I have personally reviewed and interpreted all available laboratory results.    Recent Results (from the past 24 hour(s))   GLUCOSE, POC    Collection Time: 04/23/23 10:31 PM   Result Value Ref Range    Glucose (POC) 120 (H) 65 - 117 mg/dL    Performed by Eldon Arnett RN        RADIOLOGY RESULTS:  I have personally reviewed and interpreted all available imaging studies and agree with radiology interpretation. CT SPINE CERV WO CONT   Final Result   There is no acute fracture or dislocation identified. CT SPINE LUMB WO CONT   Final Result      Mild multifactorial central canal stenosis L4-5   Moderate left L4-5 neural foraminal stenosis   Incidental bilateral nonobstructing renal calculi         No fracture. Nonobstructing renal calculi. Preliminary report was provided by   Dr. Fortino Khan, the on-call radiologist, at 0934 hours      Final report to follow. *END PRELIMINARY REPORT*                        CT PELV WO CONT   Final Result      No acute fracture               *PRELIMINARY REPORT*      No fracture. Moderate right L5-S1 neural foraminal stenosis. Preliminary report was provided by Dr. Fortino Khan, the on-call radiologist, at 2973   hours      Final report to follow. *END PRELIMINARY REPORT*                        CT HEAD WO CONT   Final Result   Normal study      XR KNEE LT 3 V   Final Result   Tricompartmental left knee osteoarthritis. Small joint effusion. XR KNEE RT 3 V   Final Result      No acute process seen         XR FEMUR RT 2 VS   Final Result      No acute process seen           CT Results  (Last 48 hours)                 04/23/23 2326  CT SPINE CERV WO CONT Final result    Impression:  There is no acute fracture or dislocation identified. Narrative:  EXAM: CT SPINE CERV WO CONT   CLINICAL HISTORY: neck pain s/p trauma   INDICATION: neck pain s/p trauma   COMPARISON:  None   TECHNIQUE:  Axial neck CT was performed. Noncontrast imaging obtained. Coronal   and sagittal reconstructions were performed.    CT dose reduction was achieved through use of a standardized protocol tailored   for this examination and automatic exposure control for dose modulation. Osseous/bone algorithm was utilized. FINDINGS:   The vertebral bodies are anatomically aligned. There is no evidence of fracture   or subluxation. The prevertebral soft tissues are grossly within normal limits. The atlantodental interval is within normal limits. The craniocervical junction   is intact. There is canal and foraminal stenosis present. No apical pneumothorax. There is   mild reversal cervical lordosis. 04/23/23 2326  CT SPINE LUMB WO CONT Final result    Impression:      Mild multifactorial central canal stenosis L4-5   Moderate left L4-5 neural foraminal stenosis   Incidental bilateral nonobstructing renal calculi           No fracture. Nonobstructing renal calculi. Preliminary report was provided by   Dr. Edith Ventura, the on-call radiologist, at 2187 hours       Final report to follow. *END PRELIMINARY REPORT*                               Narrative:  *PRELIMINARY REPORT*       EXAM:  CT SPINE LUMB WO CONT       INDICATION: Trauma       COMPARISON: None       TECHNIQUE: Helical noncontrast CT of the lumbar spine with coronal and sagittal   reformats. Images were reviewed in the bone and soft tissue windows. CT dose   reduction was achieved through the use of a standardized protocol tailored for   this examination and automatic exposure control for dose modulation. FINDINGS:        Incidental bilateral nonobstructing renal calculi. Normal appendix. L1-L2: No central canal or neural foraminal stenosis       L2-L3: No central canal or neural foraminal stenosis       L3-L4: Mild bilateral neuroforaminal stenosis secondary to disc bulge. (Axial   image 55       L4-L5: Diffuse disc bulge in combination with the bilateral ligamentum flavum   hypertrophy and mild bilateral facet arthropathy results in central canal   diameter of about 8.4 mm.  There is mild-to-moderate right and moderate left   neural foraminal stenosis (axial image 72).       L5-S1: There is a diffuse disc bulge, eccentric to the left. There is mild   bilateral neural foraminal stenosis. There is no significant central canal   stenosis. (Axial image 84). 04/23/23 2326  CT PELV WO CONT Final result    Impression:      No acute fracture                   *PRELIMINARY REPORT*       No fracture. Moderate right L5-S1 neural foraminal stenosis. Preliminary report was provided by Dr. Chelly Grimes, the on-call radiologist, at 7068   hours       Final report to follow. *END PRELIMINARY REPORT*                               Narrative:  EXAM: CT PELV WO CONT       INDICATION: trauma       COMPARISON: None. CONTRAST: None. TECHNIQUE:    Multislice helical CT was performed from the iliac crest to the symphysis pubis   without intravenous contrast administration Oral contrast was/was not   administered. Coronal and sagittal reformations were generated. CT dose   reduction was achieved through use of a standardized protocol tailored for this   examination and automatic exposure control for dose modulation. FINDING:   The visualized bowel within the pelvis is normal.        The prostate is normal.        There is no pelvic mass or adenopathy. There is no pelvic ascites or fluid   collection. There is no fracture of the pelvis or hips. Incidental moderate right L5-S1   neural foraminal stenosis. 04/23/23 2326  CT HEAD WO CONT Final result    Impression:  Normal study       Narrative:  EXAM: CT HEAD WO CONT       INDICATION: trauma       COMPARISON: None. CONTRAST: None. TECHNIQUE: Unenhanced CT of the head was performed using 5 mm images. Brain and   bone windows were generated. Coronal and sagittal reformats. CT dose reduction   was achieved through use of a standardized protocol tailored for this   examination and automatic exposure control for dose modulation.          FINDINGS:   The ventricles and sulci are normal in size, shape and configuration. There is   no significant white matter disease. There is no intracranial hemorrhage,   extra-axial collection, or mass effect. The basilar cisterns are open. No CT   evidence of acute infarct. The bone windows demonstrate no abnormalities. The visualized portions of the   paranasal sinuses and mastoid air cells are clear. CXR Results  (Last 48 hours)      None          CT SPINE CERV WO CONT   Final Result   There is no acute fracture or dislocation identified. CT SPINE LUMB WO CONT   Final Result      Mild multifactorial central canal stenosis L4-5   Moderate left L4-5 neural foraminal stenosis   Incidental bilateral nonobstructing renal calculi         No fracture. Nonobstructing renal calculi. Preliminary report was provided by   Dr. Benoit Lomeli, the on-call radiologist, at 0715 hours      Final report to follow. *END PRELIMINARY REPORT*                        CT PELV WO CONT   Final Result      No acute fracture               *PRELIMINARY REPORT*      No fracture. Moderate right L5-S1 neural foraminal stenosis. Preliminary report was provided by Dr. Benoit Lomeli, the on-call radiologist, at 6874   hours      Final report to follow. *END PRELIMINARY REPORT*                        CT HEAD WO CONT   Final Result   Normal study      XR KNEE LT 3 V   Final Result   Tricompartmental left knee osteoarthritis. Small joint effusion. XR KNEE RT 3 V   Final Result      No acute process seen         XR FEMUR RT 2 VS   Final Result      No acute process seen                MEDICAL DECISION MAKING & ED COURSE   I am the first and primary ED physician for this patient's ED visit today. I reviewed our EMR for any past records that may contribute to the patient's current condition, including their past medical, surgical, social and family history. This also includes their most recent ED visits, previous hospitalizations and prior diagnostic data.  I have reviewed and summarized the most pertinent findings in my HPI and MDM. Vital Signs Reviewed:  Patient Vitals for the past 24 hrs:   Temp Pulse Resp BP SpO2   04/24/23 0123 -- -- -- (!) 145/82 --   04/23/23 2234 -- 82 -- (!) 153/81 99 %   04/23/23 2110 97.8 °F (36.6 °C) 90 18 (!) 161/83 99 %     Pulse Oximetry Analysis: 99% on RA with good pleth    Cardiac Monitor:   Rate: 80 bpm  The cardiac monitor revealed the following rhythm as interpreted by me: Normal Sinus Rhythm  Cardiac monitoring was ordered to monitor patient for signs of cardiac dysrhythmia, which they are at risk for based on their history and/or risk for cardiovascular disease and/or metabolic abnormalities. Records Reviewed: Nursing Notes, Old Medical Records, Previous electrocardiograms, Previous Radiology Studies and Previous Laboratory Studies, EMS reports    DIFFERENTIAL DIAGNOSIS AND PLAN:  Patient presents after fall with bilateral knee and low back pain. Will get imaging to further evaluate for fracture vs. Dislocation vs. Contusion. Will treat with analgesics at this time and continue to monitor for changes in mentation. I have discussed with the patient how to reduce likelihood of falling at home by removing throw rugs, loose wires or other objects from floor, installing hand-rails in bathrooms, tubs and halls, and leaving some lights on at night. Pertinent Chronic Medical Conditions Affecting Care:  Past Medical History:   Diagnosis Date    Arthritis     Diabetes ([facility] [address]     Eczema 7/11/2012    Hypercholesterolemia     Ill-defined condition     HLD       Review of Prior Records and External Documents:  Reviewed outside hospital records, patient seen by orthopedic [place] January 12, 2023 for primary osteoarthritis. Social Determinants of Health Affecting Dx or Tx:  None    TOBACCO COUNSELING:  Upon evaluation, pt expressed that they are a current tobacco user.  For approximately 5 mins, pt has been counseled on the dangers of smoking and was encouraged to quit as soon as possible in order to decrease further risks to their health. Pt has conveyed their understanding of the risks involved should they continue to use tobacco products. Social History     Socioeconomic History    Marital status: SINGLE   Tobacco Use    Smoking status: Every Day     Packs/day: 0.50     Types: Cigarettes    Smokeless tobacco: Never   Vaping Use    Vaping Use: Never used   Substance and Sexual Activity    Alcohol use: Yes     Alcohol/week: 10.0 - 11.0 standard drinks     Types: 3 Cans of beer, 2 - 3 Shots of liquor, 5 Standard drinks or equivalent per week     Comment: 3 days a week    Drug use: Not Currently     Types: Cocaine, Heroin     Comment: remote hx heroine and cocaine    Sexual activity: Yes     Partners: Female     Comment: single,5 children. not working now     ED Course: Progress Notes, Reevaluation, and Consults:  Initial assessment performed. I discussed presenting problems and concerns, and my formulated plan for today's visit with the patient and any available family members. I have encouraged them to ask questions as they arise throughout the visit.      ED Physician Orders:   Orders Placed This Encounter    XR KNEE LT 3 V    XR KNEE RT 3 V    CT SPINE CERV WO CONT    CT SPINE LUMB WO CONT    CT PELV WO CONT    CT HEAD WO CONT    XR FEMUR RT 2 VS    GLUCOSE, POC    ketorolac (TORADOL) injection 30 mg    oxyCODONE-acetaminophen (PERCOCET) 5-325 mg per tablet 1 Tablet    DISCONTD: lidocaine 4 % patch 2 Patch    oxyCODONE-acetaminophen (Percocet) 5-325 mg per tablet    naproxen (NAPROSYN) 500 mg tablet    lidocaine (LIDODERM) 5 %     ED Medications Given:   Medications   ketorolac (TORADOL) injection 30 mg (30 mg IntraMUSCular Given 4/23/23 2359)   oxyCODONE-acetaminophen (PERCOCET) 5-325 mg per tablet 1 Tablet (1 Tablet Oral Given 4/23/23 2359)     Pt received IV/IM medications per above and placed on appropriate cardiac/respiratory monitoring due to drug toxicity. ED Physician Interpretation of Test Results: All results were independently reviewed and interpreted by myself, notably showing:     RADIOLOGY:  Non-plain film images such as CT, ultrasound and MRI are read by the radiologist. [facility] radiographic images are visualized and preliminarily interpreted by the ED Provider with the below findings:     Imaging interpreted by me:     Interpretation per the Radiologist below, if available at the time of this note:  XR FEMUR RT 2 VS    Result Date: 4/23/2023  EXAM: XR FEMUR RT 2 VS, XR KNEE RT 3 V INDICATION: right femur and knee pain. COMPARISON: None. FINDINGS: Two views of the right femur demonstrate no fracture or other acute osseous, articular or soft tissue abnormality. There is mild osteoarthritis  Three views of the right knee demonstrate no fracture, effusion or other osseous, articular or soft tissue abnormality. There is moderate tricompartmental osteoarthritis with mild joint effusion. No acute process seen     CT HEAD WO CONT    Result Date: 4/23/2023  EXAM: CT HEAD WO CONT INDICATION: trauma COMPARISON: None. CONTRAST: None. TECHNIQUE: Unenhanced CT of the head was performed using 5 mm images. Brain and bone windows were generated. Coronal and sagittal reformats. CT dose reduction was achieved through use of a standardized protocol tailored for this examination and automatic exposure control for dose modulation. FINDINGS: The ventricles and sulci are normal in size, shape and configuration. There is no significant white matter disease. There is no intracranial hemorrhage, extra-axial collection, or mass effect. The basilar cisterns are open. No CT evidence of acute infarct. The bone windows demonstrate no abnormalities. The visualized portions of the paranasal sinuses and mastoid air cells are clear.      Normal study    CT SPINE CERV WO CONT    Result Date: 4/24/2023  EXAM: CT SPINE CERV WO CONT CLINICAL HISTORY: neck pain s/p trauma INDICATION: neck pain s/p trauma COMPARISON:  None TECHNIQUE:  Axial neck CT was performed. Noncontrast imaging obtained. Coronal and sagittal reconstructions were performed. CT dose reduction was achieved through use of a standardized protocol tailored for this examination and automatic exposure control for dose modulation. Osseous/bone algorithm was utilized. FINDINGS: The vertebral bodies are anatomically aligned. There is no evidence of fracture or subluxation. The prevertebral soft tissues are grossly within normal limits. The atlantodental interval is within normal limits. The craniocervical junction is intact. There is canal and foraminal stenosis present. No apical pneumothorax. There is mild reversal cervical lordosis. There is no acute fracture or dislocation identified. CT SPINE LUMB WO CONT    Result Date: 4/24/2023  *PRELIMINARY REPORT* EXAM:  CT SPINE LUMB WO CONT INDICATION: Trauma COMPARISON: None TECHNIQUE: Helical noncontrast CT of the lumbar spine with coronal and sagittal reformats. Images were reviewed in the bone and soft tissue windows. CT dose reduction was achieved through the use of a standardized protocol tailored for this examination and automatic exposure control for dose modulation. FINDINGS: Incidental bilateral nonobstructing renal calculi. Normal appendix. L1-L2: No central canal or neural foraminal stenosis L2-L3: No central canal or neural foraminal stenosis L3-L4: Mild bilateral neuroforaminal stenosis secondary to disc bulge. (Axial image 55 L4-L5: Diffuse disc bulge in combination with the bilateral ligamentum flavum hypertrophy and mild bilateral facet arthropathy results in central canal diameter of about 8.4 mm. There is mild-to-moderate right and moderate left neural foraminal stenosis (axial image 72). L5-S1: There is a diffuse disc bulge, eccentric to the left. There is mild bilateral neural foraminal stenosis. There is no significant central canal stenosis. (Axial image 84). Mild multifactorial central canal stenosis L4-5 Moderate left L4-5 neural foraminal stenosis Incidental bilateral nonobstructing renal calculi No fracture. Nonobstructing renal calculi. Preliminary report was provided by Dr. Garrick Pop, the on-call radiologist, at 1548 hours Final report to follow. *END PRELIMINARY REPORT*     CT PELV WO CONT    Result Date: 4/24/2023  EXAM: CT PELV WO CONT INDICATION: trauma COMPARISON: None. CONTRAST: None. TECHNIQUE: Multislice helical CT was performed from the iliac crest to the symphysis pubis without intravenous contrast administration Oral contrast was/was not administered. Coronal and sagittal reformations were generated. CT dose reduction was achieved through use of a standardized protocol tailored for this examination and automatic exposure control for dose modulation. FINDING: The visualized bowel within the pelvis is normal. The prostate is normal. There is no pelvic mass or adenopathy. There is no pelvic ascites or fluid collection. There is no fracture of the pelvis or hips. Incidental moderate right L5-S1 neural foraminal stenosis. No acute fracture *PRELIMINARY REPORT* No fracture. Moderate right L5-S1 neural foraminal stenosis. Preliminary report was provided by Dr. Garrick Pop, the on-call radiologist, at 9377 hours Final report to follow. *END PRELIMINARY REPORT*     XR KNEE LT 3 V    Result Date: 4/23/2023  EXAM: XR KNEE LT 3 V INDICATION: trauma. COMPARISON: None. FINDINGS: Three views of the left knee demonstrate no fracture or other acute osseous or articular abnormality. There is tricompartmental osteoarthritis with a small joint effusion. Tricompartmental left knee osteoarthritis. Small joint effusion. XR KNEE RT 3 V    Result Date: 4/23/2023  EXAM: XR FEMUR RT 2 VS, XR KNEE RT 3 V INDICATION: right femur and knee pain. COMPARISON: None.  FINDINGS: Two views of the right femur demonstrate no fracture or other acute osseous, articular or soft tissue abnormality. There is mild osteoarthritis  Three views of the right knee demonstrate no fracture, effusion or other osseous, articular or soft tissue abnormality. There is moderate tricompartmental osteoarthritis with mild joint effusion. No acute process seen      My interpretation of EKG: No STEMI. See my EKG interpretation in ED course above. My interpretation of laboratory results:       Amount and/or Complexity of Data Reviewed  HIGH complexity decision making performed   Presentation: ACUTE and SEVERE  Clinical lab tests: ordered as appropriate & reviewed  Tests in the radiology section of CPT®: ordered as appropriate & reviewed  Tests in the medicine section of CPT®: ordered as appropriate & reviewed  Obtain history from someone other than the patient: yes  Review and summarize past medical records: yes  Independent visualization of images, tracings, or specimens: yes    Risks  OTC drugs. Prescription drug management. Parenteral controlled substances. Drug therapy requiring intensive monitoring for toxicity. Decision regarding hospitalization. Progress Note:  I have just re-evaluated the patient. Pt reports improvement of his symptoms after ED treatment. I have reviewed his vital signs and determined there is currently no worsening in their condition or physical exam. Results have been reviewed with them and their questions have been answered. I will continue to review further results as they come available. Shared Decision Making:   I considered performing MRI, but did not after shared decision making with patient due to no urine/bowel incontinence or perianal numbness to suggest cauda equina. No fever/chills, IVDA to suggest epidural abscess or discitis. No focal weakness or sensory changes to suggest transverse myelitis. Therefore, MRI not indicated. DISCHARGE  Pt reassessed and symptoms noted to have improved significantly after ED treatment.  Adán Rodriguez's labs and imaging have been reviewed with him and available family. He verbally conveys understanding and agreement of the signs, symptoms, diagnosis, treatment and prognosis and additionally agrees to follow up as recommended with Dr. Cedric Francois MD and/or specialist as instructed. He agrees with the care plan we have created and conveys that all of his questions have been answered. Additionally, I have put together a packet of discharge instructions for him that include: 1) Educational information regarding their diagnosis, 2) How to care for their diagnosis at home, as well a 3) List of reasons why they would want to return to the ED prior to their follow-up appointment should their condition change or symptoms worsen. I have answered all questions to the patient's satisfaction. Strict return precautions given. He and/or family conveyed understanding and agreement with care plan. Vital signs stable for discharge. PLAN  1. Return precautions as discussed with patient and available family/caregiver. 2.   Discharge Medication List as of 4/24/2023  1:06 AM        START taking these medications    Details   oxyCODONE-acetaminophen (Percocet) 5-325 mg per tablet Take 1 Tablet by mouth every eight (8) hours as needed for Pain for up to 5 days. Max Daily Amount: 3 Tablets. Indications: pain, Normal, Disp-10 Tablet, R-0      naproxen (NAPROSYN) 500 mg tablet Take 1 Tablet by mouth two (2) times daily (with meals). , Normal, Disp-20 Tablet, R-0      lidocaine (LIDODERM) 5 % Apply patch to the affected area for 12 hours a day and remove for 12 hours a day., Normal, Disp-15 Each, R-0           CONTINUE these medications which have NOT CHANGED    Details   traMADoL (ULTRAM) 50 mg tablet Take 1 Tablet by mouth daily as needed for Pain for up to 30 days. , Normal, Disp-30 Tablet, R-0      SITagliptin phosphate (Januvia) 50 mg tablet Take 1 Tablet by mouth daily. , Normal, Disp-30 Tablet, R-5      metFORMIN (GLUCOPHAGE) 1,000 mg tablet Take 1 Tablet by mouth two (2) times daily (with meals). , Normal, Disp-60 Tablet, R-6      lisinopriL (PRINIVIL, ZESTRIL) 2.5 mg tablet Take 1 Tablet by mouth daily. , Normal, Disp-90 Tablet, R-1      cyanocobalamin (VITAMIN B12) 500 mcg tablet Take 1 Tablet by mouth daily. , Normal, Disp-30 Tablet, R-11      ergocalciferol (ERGOCALCIFEROL) 1,250 mcg (50,000 unit) capsule Take 1 Capsule by mouth every seven (7) days. , Normal, Disp-16 Capsule, R-0      thiamine mononitrate (B-1) 100 mg tablet Take 1 Tablet by mouth daily. , Normal, Disp-30 Tablet, R-5      folic acid (FOLVITE) 1 mg tablet Take 1 Tablet by mouth daily. , Normal, Disp-30 Tablet, R-5      diclofenac (VOLTAREN) 1 % gel Apply  to affected area two (2) times daily as needed for Pain., Normal, Disp-100 g, R-3      glimepiride (AMARYL) 2 mg tablet Take 1 Tablet by mouth two (2) times a day., Normal, Disp-180 Tablet, R-1      meloxicam (MOBIC) 7.5 mg tablet 1 to 2 tab po every day with food., Normal, Disp-45 Tablet, R-3      polyethylene glycol (Miralax) 17 gram/dose powder Take 17 g by mouth daily. 1 tablespoon with 8 oz of water daily, Normal, Disp-289 g, R-0      glucose blood VI test strips (blood glucose test) strip Check BG 1-2 times/day, Normal, Disp-100 Strip, R-11      Blood-Glucose Meter (ReliOn All-In-One Meter) monitoring kit Check BG 1-2 times/day, Normal, Disp-1 Kit, R-0      lancets misc Check BS BID, Normal, Disp-100 Each, R-11      acetaminophen (TYLENOL) 325 mg tablet Take 2 Tabs by mouth three (3) times daily as needed for Pain., Normal, Disp-60 Tab, R-2      multivitamin, tx-iron-ca-min (THERA-M W/ IRON) 9 mg iron-400 mcg tab tablet Take 1 Tablet by mouth daily. , OTC, Disp-30 Tab, R-0           3.    Follow-up Information       Follow up With Specialties Details Why [facility] EMERGENCY DEPT Emergency Medicine  As needed, If symptoms worsen Tiff [address]          Instructed to return to ED if worse    CLINICAL IMPRESSION     1. Fall from ladder, initial encounter    2. Acute midline low back pain without sciatica    3. Acute pain of right knee    4. Acute pain of left knee    5. Spinal stenosis of lumbar region without neurogenic claudication      Attestation:  I am the attending of record for this patient. I personally performed the services described in this documentation on this date, 4/23/2023 for patient, Chichi Karimi. I have reviewed the chart and verified that the record is accurate and complete.       Dottie Perkins MD (Electronic Signature)

## 2023-04-24 NOTE — ED NOTES
Discharge instructions were given to the patient by Loraine Argueta RN. The patient left the Emergency Department ambulatory, alert and oriented and in no acute distress with 3 prescriptions. The patient was encouraged to call or return to the ED for worsening issues or problems and was encouraged to schedule a follow up appointment for continuing care. The patient verbalized understanding of discharge instructions and prescriptions, all questions were answered. The patient has no further concerns at this time.

## 2023-05-13 ENCOUNTER — HOSPITAL ENCOUNTER (EMERGENCY)
Facility: HOSPITAL | Age: 63
Discharge: HOME | End: 2023-05-14
Attending: EMERGENCY MEDICINE
Payer: COMMERCIAL

## 2023-05-13 VITALS
WEIGHT: 190 LBS | SYSTOLIC BLOOD PRESSURE: 151 MMHG | RESPIRATION RATE: 20 BRPM | BODY MASS INDEX: 25.73 KG/M2 | OXYGEN SATURATION: 99 % | TEMPERATURE: 98.1 F | HEIGHT: 72 IN | HEART RATE: 91 BPM | DIASTOLIC BLOOD PRESSURE: 66 MMHG

## 2023-05-13 DIAGNOSIS — M25.561 ACUTE PAIN OF BOTH KNEES: Primary | ICD-10-CM

## 2023-05-13 DIAGNOSIS — M25.562 ACUTE PAIN OF BOTH KNEES: Primary | ICD-10-CM

## 2023-05-13 PROCEDURE — 99284 EMERGENCY DEPT VISIT MOD MDM: CPT

## 2023-05-13 RX ORDER — OXYCODONE HYDROCHLORIDE 5 MG/1
5 TABLET ORAL
Status: COMPLETED | OUTPATIENT
Start: 2023-05-13 | End: 2023-05-14

## 2023-05-13 ASSESSMENT — LIFESTYLE VARIABLES
HOW OFTEN DO YOU HAVE A DRINK CONTAINING ALCOHOL: 2-3 TIMES A WEEK
HOW MANY STANDARD DRINKS CONTAINING ALCOHOL DO YOU HAVE ON A TYPICAL DAY: 1 OR 2

## 2023-05-13 ASSESSMENT — PAIN DESCRIPTION - DESCRIPTORS: DESCRIPTORS: SHARP

## 2023-05-13 ASSESSMENT — PAIN DESCRIPTION - ORIENTATION: ORIENTATION: LEFT;RIGHT

## 2023-05-13 ASSESSMENT — PAIN DESCRIPTION - LOCATION: LOCATION: KNEE

## 2023-05-13 ASSESSMENT — PAIN - FUNCTIONAL ASSESSMENT: PAIN_FUNCTIONAL_ASSESSMENT: 0-10

## 2023-05-13 ASSESSMENT — PAIN SCALES - GENERAL: PAINLEVEL_OUTOF10: 10

## 2023-05-14 ENCOUNTER — APPOINTMENT (OUTPATIENT)
Facility: HOSPITAL | Age: 63
End: 2023-05-14
Payer: COMMERCIAL

## 2023-05-14 PROCEDURE — 93971 EXTREMITY STUDY: CPT

## 2023-05-14 PROCEDURE — 6370000000 HC RX 637 (ALT 250 FOR IP): Performed by: EMERGENCY MEDICINE

## 2023-05-14 RX ORDER — TRAMADOL HYDROCHLORIDE 50 MG/1
50 TABLET ORAL EVERY 6 HOURS PRN
Qty: 12 TABLET | Refills: 0 | Status: SHIPPED | OUTPATIENT
Start: 2023-05-14 | End: 2023-05-17

## 2023-05-14 RX ADMIN — OXYCODONE HYDROCHLORIDE 5 MG: 5 TABLET ORAL at 00:15

## 2023-05-14 ASSESSMENT — PAIN DESCRIPTION - LOCATION
LOCATION: KNEE
LOCATION: KNEE

## 2023-05-14 ASSESSMENT — PAIN - FUNCTIONAL ASSESSMENT
PAIN_FUNCTIONAL_ASSESSMENT: ACTIVITIES ARE NOT PREVENTED
PAIN_FUNCTIONAL_ASSESSMENT: ACTIVITIES ARE NOT PREVENTED

## 2023-05-14 ASSESSMENT — PAIN SCALES - GENERAL
PAINLEVEL_OUTOF10: 6
PAINLEVEL_OUTOF10: 10

## 2023-05-14 ASSESSMENT — PAIN DESCRIPTION - DESCRIPTORS
DESCRIPTORS: ACHING;THROBBING
DESCRIPTORS: ACHING;THROBBING

## 2023-05-14 ASSESSMENT — PAIN DESCRIPTION - ORIENTATION
ORIENTATION: LEFT;RIGHT
ORIENTATION: LEFT;RIGHT

## 2023-05-15 LAB — ECHO BSA: 2.09 M2

## 2023-05-15 PROCEDURE — 93971 EXTREMITY STUDY: CPT | Performed by: INTERNAL MEDICINE

## 2023-05-19 RX ORDER — NAPROXEN 500 MG/1
500 TABLET ORAL 2 TIMES DAILY WITH MEALS
COMMUNITY
Start: 2023-04-24 | End: 2023-05-24 | Stop reason: ALTCHOICE

## 2023-05-19 RX ORDER — LANOLIN ALCOHOL/MO/W.PET/CERES
100 CREAM (GRAM) TOPICAL DAILY
COMMUNITY
Start: 2022-12-13

## 2023-05-19 RX ORDER — LANCETS 30 GAUGE
EACH MISCELLANEOUS
COMMUNITY
Start: 2021-09-27

## 2023-05-19 RX ORDER — ACETAMINOPHEN 325 MG/1
650 TABLET ORAL 3 TIMES DAILY PRN
COMMUNITY
Start: 2019-11-06

## 2023-05-19 RX ORDER — LIDOCAINE 50 MG/G
PATCH TOPICAL
COMMUNITY
Start: 2023-04-24

## 2023-05-19 RX ORDER — POLYETHYLENE GLYCOL 3350 17 G/17G
17 POWDER, FOR SOLUTION ORAL DAILY
COMMUNITY
Start: 2022-01-10

## 2023-05-19 RX ORDER — LISINOPRIL 2.5 MG/1
2.5 TABLET ORAL DAILY
COMMUNITY
Start: 2022-10-04 | End: 2023-06-09 | Stop reason: SDUPTHER

## 2023-05-19 RX ORDER — ERGOCALCIFEROL 1.25 MG/1
50000 CAPSULE ORAL
COMMUNITY
Start: 2023-04-04

## 2023-05-19 RX ORDER — FOLIC ACID 1 MG/1
1 TABLET ORAL DAILY
COMMUNITY
Start: 2022-12-13

## 2023-05-19 RX ORDER — GLIMEPIRIDE 2 MG/1
2 TABLET ORAL 2 TIMES DAILY
COMMUNITY
Start: 2022-12-13

## 2023-05-19 RX ORDER — MELOXICAM 7.5 MG/1
TABLET ORAL
COMMUNITY
Start: 2022-12-13 | End: 2023-05-24 | Stop reason: ALTCHOICE

## 2023-05-24 ENCOUNTER — TELEMEDICINE (OUTPATIENT)
Age: 63
End: 2023-05-24
Payer: COMMERCIAL

## 2023-05-24 ENCOUNTER — TELEPHONE (OUTPATIENT)
Age: 63
End: 2023-05-24

## 2023-05-24 DIAGNOSIS — I10 ESSENTIAL (PRIMARY) HYPERTENSION: ICD-10-CM

## 2023-05-24 DIAGNOSIS — E53.8 B12 DEFICIENCY: ICD-10-CM

## 2023-05-24 DIAGNOSIS — M17.0 BILATERAL PRIMARY OSTEOARTHRITIS OF KNEE: Primary | ICD-10-CM

## 2023-05-24 DIAGNOSIS — E11.9 TYPE 2 DIABETES MELLITUS WITHOUT COMPLICATION, WITHOUT LONG-TERM CURRENT USE OF INSULIN (HCC): ICD-10-CM

## 2023-05-24 PROCEDURE — 99214 OFFICE O/P EST MOD 30 MIN: CPT | Performed by: INTERNAL MEDICINE

## 2023-05-24 PROCEDURE — 3044F HG A1C LEVEL LT 7.0%: CPT | Performed by: INTERNAL MEDICINE

## 2023-05-24 RX ORDER — OXYCODONE HYDROCHLORIDE AND ACETAMINOPHEN 5; 325 MG/1; MG/1
1 TABLET ORAL DAILY PRN
Qty: 30 TABLET | Refills: 0 | Status: SHIPPED | OUTPATIENT
Start: 2023-05-24 | End: 2023-07-23

## 2023-05-24 RX ORDER — MELOXICAM 7.5 MG/1
TABLET ORAL
COMMUNITY
Start: 2022-12-13

## 2023-05-24 RX ORDER — OXYCODONE HYDROCHLORIDE AND ACETAMINOPHEN 5; 325 MG/1; MG/1
1 TABLET ORAL DAILY PRN
Qty: 30 TABLET | Refills: 0 | Status: SHIPPED | OUTPATIENT
Start: 2023-05-24 | End: 2023-05-24

## 2023-05-24 RX ORDER — LISINOPRIL 2.5 MG/1
2.5 TABLET ORAL
COMMUNITY
Start: 2023-02-02 | End: 2023-05-24 | Stop reason: ALTCHOICE

## 2023-05-24 RX ORDER — OXYCODONE HYDROCHLORIDE AND ACETAMINOPHEN 5; 325 MG/1; MG/1
TABLET ORAL
COMMUNITY
Start: 2023-04-24 | End: 2023-05-24

## 2023-05-24 SDOH — ECONOMIC STABILITY: INCOME INSECURITY: HOW HARD IS IT FOR YOU TO PAY FOR THE VERY BASICS LIKE FOOD, HOUSING, MEDICAL CARE, AND HEATING?: SOMEWHAT HARD

## 2023-05-24 SDOH — ECONOMIC STABILITY: FOOD INSECURITY: WITHIN THE PAST 12 MONTHS, YOU WORRIED THAT YOUR FOOD WOULD RUN OUT BEFORE YOU GOT MONEY TO BUY MORE.: SOMETIMES TRUE

## 2023-05-24 SDOH — ECONOMIC STABILITY: HOUSING INSECURITY
IN THE LAST 12 MONTHS, WAS THERE A TIME WHEN YOU DID NOT HAVE A STEADY PLACE TO SLEEP OR SLEPT IN A SHELTER (INCLUDING NOW)?: NO

## 2023-05-24 SDOH — ECONOMIC STABILITY: FOOD INSECURITY: WITHIN THE PAST 12 MONTHS, THE FOOD YOU BOUGHT JUST DIDN'T LAST AND YOU DIDN'T HAVE MONEY TO GET MORE.: SOMETIMES TRUE

## 2023-05-24 ASSESSMENT — PATIENT HEALTH QUESTIONNAIRE - PHQ9
1. LITTLE INTEREST OR PLEASURE IN DOING THINGS: 0
2. FEELING DOWN, DEPRESSED OR HOPELESS: 0
SUM OF ALL RESPONSES TO PHQ QUESTIONS 1-9: 0
SUM OF ALL RESPONSES TO PHQ9 QUESTIONS 1 & 2: 0
SUM OF ALL RESPONSES TO PHQ QUESTIONS 1-9: 0

## 2023-05-24 NOTE — PROGRESS NOTES
Adilson Carlisle (:  1960) is a Established patient, presenting virtually for evaluation of the following:    Assessment & Plan   Below is the assessment and plan developed based on review of pertinent history, physical exam, labs, studies, and medications. 1. Bilateral primary osteoarthritis of knee    He refused to do surgery. Orthopedic surgeon already offered him to do surgery. He is not ready to do surgery. He wanted me to give him pain medication. He was on tramadol in the past, now he mentioned that tramadol is not helping him with the pain. He was prescribed Percocet from the emergency room and now he would like to get Percocet. I have told him that since he is having ongoing chronic pain and not authorized to do pain management, he needs to see pain specialist for further refill. We will give him #30 tablets with Percocet for now. He needs to follow-up with pain management. -     External Referral To Pain Clinic  -     oxyCODONE-acetaminophen (PERCOCET) 5-325 MG per tablet; Take 1 tablet by mouth daily as needed for Pain for up to 60 days. Max Daily Amount: 1 tablet, Disp-30 tablet, R-0Normal  2. Essential (primary) hypertension    Stable blood pressure. On lisinopril. 3. B12 deficiency  Taking B12 supplement. He drinks alcohol. 4.  Type 2 diabetes mellitus    Taking glimepiride, metformin. A1c was 6.4. No follow-ups on file. Subjective     Mr. Montes Record is here for follow-up. Reported bilateral knee pain and lower back pain which is not getting better. He suffers from chronic knee pain for which his orthopedics has offered him to do surgery. Patient refused to do surgery. He was on tramadol, he ended up in emergency room because of fall from ladder and had all x-rays done. He also went back to emergency room again because pain was not getting better. Patient was prescribed Percocet and which is helping a lot with his pain. He would like to get refill on Percocet.   He

## 2023-06-08 NOTE — TELEPHONE ENCOUNTER
Lov 4/4/23  No upcoming appointments    Monica Amaya 13  Lisinopril 2.5mg tab    90 day script request

## 2023-06-09 RX ORDER — LISINOPRIL 2.5 MG/1
2.5 TABLET ORAL DAILY
Qty: 30 TABLET | Refills: 1 | Status: SHIPPED | OUTPATIENT
Start: 2023-06-09

## 2023-06-22 ENCOUNTER — TELEPHONE (OUTPATIENT)
Age: 63
End: 2023-06-22

## 2023-06-22 NOTE — TELEPHONE ENCOUNTER
Last appt 5/24/2023      Most likely not our office sending text. Don President was called and verbalized understanding on note below.  Gave number to pain mgmt

## 2023-06-22 NOTE — TELEPHONE ENCOUNTER
Patient has been receiving messages to schedule an appointment for about a month, the text message doesn't give a reason. Patient states that he's been calling for about 2 weeks now requesting a call back to discuss diabetes medication but no response.      Please return call at [phone]

## 2023-07-17 ENCOUNTER — TELEPHONE (OUTPATIENT)
Age: 63
End: 2023-07-17

## 2023-07-17 DIAGNOSIS — E11.21 TYPE 2 DIABETES WITH NEPHROPATHY (HCC): Primary | ICD-10-CM

## 2023-07-17 NOTE — TELEPHONE ENCOUNTER
Requested Prescriptions     Pending Prescriptions Disp Refills    SITagliptin (JANUVIA) 50 MG tablet 90 tablet 1     Sig: Take 1 tablet by mouth daily         [address] [place] [address], [facility] [phone] Demetris Rojas [phone]  [address] [place] [address]  Phone: [phone] Fax: [phone]       Last appt 5/24/2023      No future appointments.

## 2023-07-17 NOTE — TELEPHONE ENCOUNTER
Patient is requesting a call back in regards to questions he has about medication.      SITagliptin (JANUVIA) 50 MG tablet

## 2023-07-17 NOTE — TELEPHONE ENCOUNTER
SITagliptin (JANUVIA) 50 MG tablet    Last OV: 05/24/2023  No upcoming     [facility], [address] [place] [address] Dr Lauren TIWARI [phone]

## 2023-07-17 NOTE — TELEPHONE ENCOUNTER
Future Appointments   Date Time Provider [facility] [address]   7/19/2023 11:15 AM Destinee Alonso MD [facility]        Jennifer Hicks was called and verbalized understanding on note below.

## 2023-07-18 DIAGNOSIS — E11.21 TYPE 2 DIABETES WITH NEPHROPATHY (HCC): ICD-10-CM

## 2023-07-18 RX ORDER — SITAGLIPTIN 50 MG/1
TABLET, FILM COATED ORAL
Qty: 30 TABLET | Refills: 0 | OUTPATIENT
Start: 2023-07-18

## 2023-07-19 ENCOUNTER — TELEMEDICINE (OUTPATIENT)
Age: 63
End: 2023-07-19
Payer: COMMERCIAL

## 2023-07-19 DIAGNOSIS — E11.21 TYPE 2 DIABETES WITH NEPHROPATHY (HCC): ICD-10-CM

## 2023-07-19 DIAGNOSIS — M17.0 PRIMARY OSTEOARTHRITIS OF BOTH KNEES: Primary | ICD-10-CM

## 2023-07-19 DIAGNOSIS — E78.5 HYPERLIPIDEMIA ASSOCIATED WITH TYPE 2 DIABETES MELLITUS (HCC): ICD-10-CM

## 2023-07-19 DIAGNOSIS — E53.8 B12 DEFICIENCY: ICD-10-CM

## 2023-07-19 DIAGNOSIS — E55.9 VITAMIN D DEFICIENCY: ICD-10-CM

## 2023-07-19 DIAGNOSIS — E11.69 HYPERLIPIDEMIA ASSOCIATED WITH TYPE 2 DIABETES MELLITUS (HCC): ICD-10-CM

## 2023-07-19 PROCEDURE — 3044F HG A1C LEVEL LT 7.0%: CPT | Performed by: INTERNAL MEDICINE

## 2023-07-19 PROCEDURE — 99214 OFFICE O/P EST MOD 30 MIN: CPT | Performed by: INTERNAL MEDICINE

## 2023-07-19 RX ORDER — MELOXICAM 7.5 MG/1
7.5 TABLET ORAL DAILY
Qty: 30 TABLET | Refills: 5 | Status: SHIPPED | OUTPATIENT
Start: 2023-07-19

## 2023-07-19 RX ORDER — TRAMADOL HYDROCHLORIDE 50 MG/1
50 TABLET ORAL 2 TIMES DAILY PRN
Qty: 20 TABLET | Refills: 0 | Status: SHIPPED | OUTPATIENT
Start: 2023-07-19 | End: 2023-08-18

## 2023-07-19 ASSESSMENT — PATIENT HEALTH QUESTIONNAIRE - PHQ9
SUM OF ALL RESPONSES TO PHQ QUESTIONS 1-9: 0
SUM OF ALL RESPONSES TO PHQ QUESTIONS 1-9: 0
1. LITTLE INTEREST OR PLEASURE IN DOING THINGS: 0
SUM OF ALL RESPONSES TO PHQ QUESTIONS 1-9: 0
2. FEELING DOWN, DEPRESSED OR HOPELESS: 0
SUM OF ALL RESPONSES TO PHQ9 QUESTIONS 1 & 2: 0
SUM OF ALL RESPONSES TO PHQ QUESTIONS 1-9: 0

## 2023-07-19 NOTE — PROGRESS NOTES
Abebe Moore (:  1960) is a Established patient, presenting virtually for evaluation of the following:    Assessment & Plan   Below is the assessment and plan developed based on review of pertinent history, physical exam, labs, studies, and medications. 1. Primary osteoarthritis of both knees    He is having moderate DJD of the knee. He was seen by orthopedics in [facility], knee surgery recommended but patient is still smoking so he is not allowed to have any arthroplasty. Advised him to take meloxicam every day along with diclofenac gel. He mention pain is not quite controlled. We will Give him #20 tramadol tablets as needed to use. -     meloxicam (MOBIC) 7.5 MG tablet; Take 1 tablet by mouth daily, Disp-30 tablet, R-5Normal  -     traMADol (ULTRAM) 50 MG tablet; Take 1 tablet by mouth 2 times daily as needed for Pain for up to 30 days. Intended supply: 5 days. Take lowest dose possible to manage pain Max Daily Amount: 100 mg, Disp-20 tablet, R-0Normal  2. Type 2 diabetes with nephropathy (HCC)    Last A1c at goal.  On metformin, glimepiride and Januvia. We will check,  -     Comprehensive Metabolic Panel  -     Hemoglobin A1C  3. Hyperlipidemia associated with type 2 diabetes mellitus ([address])      Be on low-cholesterol diet and exercise. We will check,  -     Comprehensive Metabolic Panel  4. B12 deficiency    His B12 level was very low, he is not taking supplement. We will call in,  -     cyanocobalamin 500 MCG tablet; Take 1 tablet by mouth daily, Disp-30 tablet, R-5Normal  5. Vitamin D deficiency    Finished up high-dose of vitamin D. We will repeat,  -     Vitamin D 25 Hydroxy    No follow-ups on file. Subjective     Mr. Hong Taylor is here for follow-up. He is diabetic, on multiple medication. Has difficult to find Januvia refill but finally he got it. Need foot exam.  Eye checkup up-to-date. Need lab work. Has hypertension, on lisinopril, doing well.   Denies chest pain palpitation or

## 2023-08-11 DIAGNOSIS — I10 ESSENTIAL (PRIMARY) HYPERTENSION: Primary | ICD-10-CM

## 2023-08-14 RX ORDER — LISINOPRIL 2.5 MG/1
TABLET ORAL
Qty: 30 TABLET | Refills: 0 | Status: SHIPPED | OUTPATIENT
Start: 2023-08-14

## 2023-08-14 NOTE — TELEPHONE ENCOUNTER
Requested Prescriptions     Pending Prescriptions Disp Refills    lisinopril (PRINIVIL;ZESTRIL) 2.5 MG tablet [Pharmacy Med Name: Lisinopril 2.5 MG Oral Tablet] 30 tablet 0     Sig: Take 1 tablet by mouth once daily         [address] [place] [address] [place] [phone] - F [phone]  [address] [place] [address] [address] [address]  Phone: [phone] Fax: [phone]       Last appt 7/19/2023      Future Appointments   Date Time Provider [address] [place] [address]   11/16/2023 10:45 AM Alexa Buck MD [facility]

## 2023-08-26 ENCOUNTER — HOSPITAL ENCOUNTER (EMERGENCY)
Facility: HOSPITAL | Age: 63
Discharge: HOME | End: 2023-08-27
Attending: EMERGENCY MEDICINE
Payer: COMMERCIAL

## 2023-08-26 DIAGNOSIS — T40.2X1A OPIOID OVERDOSE, ACCIDENTAL OR UNINTENTIONAL, INITIAL ENCOUNTER (HCC): Primary | ICD-10-CM

## 2023-08-26 PROCEDURE — 99284 EMERGENCY DEPT VISIT MOD MDM: CPT

## 2023-08-26 PROCEDURE — 96375 TX/PRO/DX INJ NEW DRUG ADDON: CPT

## 2023-08-26 PROCEDURE — 96374 THER/PROPH/DIAG INJ IV PUSH: CPT

## 2023-08-26 RX ORDER — NALOXONE HYDROCHLORIDE 0.4 MG/ML
0.4 INJECTION, SOLUTION INTRAMUSCULAR; INTRAVENOUS; SUBCUTANEOUS
Status: COMPLETED | OUTPATIENT
Start: 2023-08-26 | End: 2023-08-27

## 2023-08-26 ASSESSMENT — LIFESTYLE VARIABLES
HOW OFTEN DO YOU HAVE A DRINK CONTAINING ALCOHOL: 4 OR MORE TIMES A WEEK
HOW MANY STANDARD DRINKS CONTAINING ALCOHOL DO YOU HAVE ON A TYPICAL DAY: 3 OR 4

## 2023-08-26 ASSESSMENT — PAIN - FUNCTIONAL ASSESSMENT: PAIN_FUNCTIONAL_ASSESSMENT: NONE - DENIES PAIN

## 2023-08-27 VITALS
SYSTOLIC BLOOD PRESSURE: 142 MMHG | HEIGHT: 72 IN | BODY MASS INDEX: 25.73 KG/M2 | DIASTOLIC BLOOD PRESSURE: 71 MMHG | TEMPERATURE: 98 F | RESPIRATION RATE: 12 BRPM | HEART RATE: 79 BPM | WEIGHT: 190 LBS | OXYGEN SATURATION: 98 %

## 2023-08-27 PROCEDURE — 96375 TX/PRO/DX INJ NEW DRUG ADDON: CPT

## 2023-08-27 PROCEDURE — 96374 THER/PROPH/DIAG INJ IV PUSH: CPT

## 2023-08-27 PROCEDURE — 6360000002 HC RX W HCPCS: Performed by: EMERGENCY MEDICINE

## 2023-08-27 RX ORDER — ONDANSETRON 2 MG/ML
4 INJECTION INTRAMUSCULAR; INTRAVENOUS EVERY 6 HOURS PRN
Status: DISCONTINUED | OUTPATIENT
Start: 2023-08-27 | End: 2023-08-27 | Stop reason: HOSPADM

## 2023-08-27 RX ORDER — NALOXONE HYDROCHLORIDE 4 MG/.1ML
1 SPRAY NASAL PRN
Qty: 2 EACH | Refills: 0 | Status: SHIPPED | OUTPATIENT
Start: 2023-08-27

## 2023-08-27 RX ADMIN — NALOXONE HYDROCHLORIDE 0.4 MG: 0.4 INJECTION, SOLUTION INTRAMUSCULAR; INTRAVENOUS; SUBCUTANEOUS at 00:08

## 2023-08-27 RX ADMIN — ONDANSETRON 4 MG: 2 INJECTION INTRAMUSCULAR; INTRAVENOUS at 00:08

## 2023-08-27 NOTE — ED PROVIDER NOTES
MG tablet  Commonly known as: PRINIVIL;ZESTRIL  Take 1 tablet by mouth once daily     meloxicam 7.5 MG tablet  Commonly known as: MOBIC  Take 1 tablet by mouth daily     metFORMIN 1000 MG tablet  Commonly known as: GLUCOPHAGE     polyethylene glycol 17 GM/SCOOP powder  Commonly known as: GLYCOLAX     SITagliptin 50 MG tablet  Commonly known as: JANUVIA  Take 1 tablet by mouth daily     thiamine 100 MG tablet               Where to Get Your Medications        These medications were sent to [address] [place] [address],[address] [phone]  [address] [place] [address], [address] [place] [address]      Phone: [phone]   naloxone 4 MG/0.1ML Liqd nasal spray           DISCONTINUED MEDICATIONS:  Current Discharge Medication List          I am the Primary Clinician of Record. Rachel Pedro DO (electronically signed)    (Please note that parts of this dictation were completed with voice recognition software. Quite often unanticipated grammatical, syntax, homophones, and other interpretive errors are inadvertently transcribed by the computer software. Please disregards these errors.  Please excuse any errors that have escaped final proofreading.)         Rachel Pedro DO  08/27/23 [id number]

## 2023-08-27 NOTE — ED NOTES
Patient (s)  given copy of dc instructions and 1 script(s). Patient (s)  verbalized understanding of instructions and script (s). Patient given a current medication reconciliation form and verbalized understanding of their medications. Patient (s) verbalized understanding of the importance of discussing medications with  his or her physician or clinic they will be following up with. Patient alert and oriented and in no acute distress. Patient discharged home ambulatory with self.         Unknown LENA Fraire  08/27/23 [id number]

## 2023-08-27 NOTE — ED TRIAGE NOTES
Pt arrived via EMS after using heroin and cocaine. Pt found unresponsive in a parking lot. [employer] administered 4mg Narcan.

## 2023-09-17 DIAGNOSIS — I10 ESSENTIAL (PRIMARY) HYPERTENSION: ICD-10-CM

## 2023-09-18 RX ORDER — LISINOPRIL 2.5 MG/1
TABLET ORAL
Qty: 30 TABLET | Refills: 0 | Status: SHIPPED | OUTPATIENT
Start: 2023-09-18

## 2023-09-25 ENCOUNTER — TELEPHONE (OUTPATIENT)
Age: 63
End: 2023-09-25

## 2023-09-25 NOTE — TELEPHONE ENCOUNTER
----- Message from Durga Agustin sent at 9/25/2023  9:18 AM EDT -----  Subject: Appointment Request    Reason for Call:     QUESTIONS    Reason for appointment request? Other - Could not reach office. Additional Information for Provider?  Pt needing to schedule an appt with   Dr. Alessia Foreman for diabetes check   ---------------------------------------------------------------------------  --------------  Giovanni Samuels INFO  [phone]; OK to leave message on voicemail  ---------------------------------------------------------------------------  --------------  SCRIPT ANSWERS

## 2023-10-16 DIAGNOSIS — I10 ESSENTIAL (PRIMARY) HYPERTENSION: ICD-10-CM

## 2023-10-16 RX ORDER — LISINOPRIL 2.5 MG/1
TABLET ORAL
Qty: 30 TABLET | Refills: 0 | Status: SHIPPED | OUTPATIENT
Start: 2023-10-16

## 2023-11-16 ENCOUNTER — OFFICE VISIT (OUTPATIENT)
Age: 63
End: 2023-11-16
Payer: COMMERCIAL

## 2023-11-16 VITALS
HEART RATE: 80 BPM | OXYGEN SATURATION: 97 % | RESPIRATION RATE: 16 BRPM | TEMPERATURE: 98 F | SYSTOLIC BLOOD PRESSURE: 138 MMHG | HEIGHT: 72 IN | DIASTOLIC BLOOD PRESSURE: 72 MMHG | BODY MASS INDEX: 24.81 KG/M2 | WEIGHT: 183.2 LBS
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
## 2023-11-16 DIAGNOSIS — M25.562 CHRONIC PAIN OF BOTH KNEES: ICD-10-CM

## 2023-11-16 DIAGNOSIS — Z13.6 SCREENING FOR AAA (ABDOMINAL AORTIC ANEURYSM): ICD-10-CM

## 2023-11-16 DIAGNOSIS — I10 ESSENTIAL (PRIMARY) HYPERTENSION: ICD-10-CM

## 2023-11-16 DIAGNOSIS — B35.3 TINEA PEDIS OF BOTH FEET: ICD-10-CM

## 2023-11-16 DIAGNOSIS — E78.5 HYPERLIPIDEMIA ASSOCIATED WITH TYPE 2 DIABETES MELLITUS (HCC): ICD-10-CM

## 2023-11-16 DIAGNOSIS — M25.561 CHRONIC PAIN OF BOTH KNEES: ICD-10-CM

## 2023-11-16 DIAGNOSIS — Z23 NEEDS FLU SHOT: ICD-10-CM

## 2023-11-16 DIAGNOSIS — F17.200 SMOKING: ICD-10-CM

## 2023-11-16 DIAGNOSIS — E53.8 B12 DEFICIENCY: ICD-10-CM

## 2023-11-16 DIAGNOSIS — E11.21 TYPE 2 DIABETES WITH NEPHROPATHY (HCC): Primary | ICD-10-CM

## 2023-11-16 DIAGNOSIS — E55.9 VITAMIN D DEFICIENCY: ICD-10-CM

## 2023-11-16 DIAGNOSIS — E11.69 HYPERLIPIDEMIA ASSOCIATED WITH TYPE 2 DIABETES MELLITUS (HCC): ICD-10-CM

## 2023-11-16 DIAGNOSIS — G89.29 CHRONIC PAIN OF BOTH KNEES: ICD-10-CM

## 2023-11-16 PROCEDURE — 90694 VACC AIIV4 NO PRSRV 0.5ML IM: CPT | Performed by: INTERNAL MEDICINE

## 2023-11-16 PROCEDURE — 99214 OFFICE O/P EST MOD 30 MIN: CPT | Performed by: INTERNAL MEDICINE

## 2023-11-16 RX ORDER — TRAMADOL HYDROCHLORIDE 50 MG/1
50 TABLET ORAL DAILY PRN
Qty: 30 TABLET | Refills: 0 | Status: SHIPPED | OUTPATIENT
Start: 2023-11-16 | End: 2024-11-10

## 2023-11-16 RX ORDER — CLOTRIMAZOLE AND BETAMETHASONE DIPROPIONATE 10; .64 MG/G; MG/G
CREAM TOPICAL
Qty: 30 G | Refills: 0 | Status: SHIPPED | OUTPATIENT
Start: 2023-11-16

## 2023-11-16 ASSESSMENT — ENCOUNTER SYMPTOMS
GASTROINTESTINAL NEGATIVE: 1
EYES NEGATIVE: 1
RESPIRATORY NEGATIVE: 1

## 2023-11-16 NOTE — PROGRESS NOTES
monitoring emphasized, all medications, side effects and compliance discussed carefully, foot care discussed and Podiatry visits discussed, annual eye examinations at Ophthalmology discussed, long term diabetic complications discussed and labs immediately prior to next visit.

## 2023-11-24 DIAGNOSIS — E11.69 HYPERLIPIDEMIA ASSOCIATED WITH TYPE 2 DIABETES MELLITUS (HCC): Primary | ICD-10-CM

## 2023-11-24 DIAGNOSIS — E78.5 HYPERLIPIDEMIA ASSOCIATED WITH TYPE 2 DIABETES MELLITUS (HCC): Primary | ICD-10-CM

## 2023-11-24 DIAGNOSIS — E11.21 TYPE 2 DIABETES WITH NEPHROPATHY (HCC): ICD-10-CM

## 2023-11-24 RX ORDER — GLIMEPIRIDE 2 MG/1
2 TABLET ORAL 2 TIMES DAILY
Qty: 180 TABLET | Refills: 0 | Status: SHIPPED | OUTPATIENT
Start: 2023-11-24

## 2023-11-26 DIAGNOSIS — I10 ESSENTIAL (PRIMARY) HYPERTENSION: ICD-10-CM

## 2023-11-26 DIAGNOSIS — E11.21 TYPE 2 DIABETES WITH NEPHROPATHY (HCC): Primary | ICD-10-CM

## 2023-11-27 RX ORDER — LISINOPRIL 2.5 MG/1
TABLET ORAL
Qty: 90 TABLET | Refills: 1 | Status: SHIPPED | OUTPATIENT
Start: 2023-11-27

## 2024-01-22 DIAGNOSIS — E11.21 TYPE 2 DIABETES WITH NEPHROPATHY (HCC): ICD-10-CM

## 2024-01-22 DIAGNOSIS — I10 ESSENTIAL (PRIMARY) HYPERTENSION: ICD-10-CM

## 2024-01-22 DIAGNOSIS — B35.3 TINEA PEDIS OF BOTH FEET: ICD-10-CM

## 2024-01-22 RX ORDER — LISINOPRIL 2.5 MG/1
2.5 TABLET ORAL DAILY
Qty: 90 TABLET | Refills: 1 | Status: SHIPPED | OUTPATIENT
Start: 2024-01-22

## 2024-01-22 RX ORDER — CLOTRIMAZOLE AND BETAMETHASONE DIPROPIONATE 10; .64 MG/G; MG/G
CREAM TOPICAL
Qty: 30 G | Refills: 0 | Status: SHIPPED | OUTPATIENT
Start: 2024-01-22

## 2024-01-22 NOTE — TELEPHONE ENCOUNTER
Lov: 11/16/2024  Nov: 3/11/24    [facility] [address]    Metformin  dorianuvia  Lisinopril  Pt also wants to know if he can get a refill on clotrimazole-betamethasone foot cream. He lost this cream while moving

## 2024-03-11 ENCOUNTER — OFFICE VISIT (OUTPATIENT)
Age: 64
End: 2024-03-11
Payer: COMMERCIAL

## 2024-03-11 VITALS
HEIGHT: 72 IN | HEART RATE: 94 BPM | BODY MASS INDEX: 25.52 KG/M2 | SYSTOLIC BLOOD PRESSURE: 150 MMHG | OXYGEN SATURATION: 96 % | TEMPERATURE: 97 F | WEIGHT: 188.4 LBS | DIASTOLIC BLOOD PRESSURE: 82 MMHG | RESPIRATION RATE: 16 BRPM

## 2024-03-11 DIAGNOSIS — I10 ESSENTIAL (PRIMARY) HYPERTENSION: Primary | ICD-10-CM

## 2024-03-11 DIAGNOSIS — E55.9 VITAMIN D DEFICIENCY: ICD-10-CM

## 2024-03-11 DIAGNOSIS — N52.9 ERECTILE DYSFUNCTION, UNSPECIFIED ERECTILE DYSFUNCTION TYPE: ICD-10-CM

## 2024-03-11 DIAGNOSIS — E11.21 TYPE 2 DIABETES WITH NEPHROPATHY (HCC): ICD-10-CM

## 2024-03-11 DIAGNOSIS — Z12.5 SCREENING FOR MALIGNANT NEOPLASM OF PROSTATE: ICD-10-CM

## 2024-03-11 DIAGNOSIS — Z13.6 SCREENING FOR AAA (ABDOMINAL AORTIC ANEURYSM): ICD-10-CM

## 2024-03-11 DIAGNOSIS — M17.0 PRIMARY OSTEOARTHRITIS OF BOTH KNEES: ICD-10-CM

## 2024-03-11 DIAGNOSIS — F17.200 SMOKING: ICD-10-CM

## 2024-03-11 LAB
BASOPHILS # BLD AUTO: 0 X10E3/UL (ref 0–0.2)
BASOPHILS NFR BLD AUTO: 0 %
EOSINOPHIL # BLD AUTO: 0.1 X10E3/UL (ref 0–0.4)
EOSINOPHIL NFR BLD AUTO: 1 %
ERYTHROCYTE [DISTWIDTH] IN BLOOD BY AUTOMATED COUNT: 13.4 % (ref 11.6–15.4)
HCT VFR BLD AUTO: 42.9 % (ref 37.5–51)
HGB BLD-MCNC: 13.7 G/DL (ref 13–17.7)
IMM GRANULOCYTES # BLD AUTO: 0 X10E3/UL (ref 0–0.1)
IMM GRANULOCYTES NFR BLD AUTO: 0 %
LYMPHOCYTES # BLD AUTO: 0.8 X10E3/UL (ref 0.7–3.1)
LYMPHOCYTES NFR BLD AUTO: 14 %
MCH RBC QN AUTO: 26.9 PG (ref 26.6–33)
MCHC RBC AUTO-ENTMCNC: 31.9 G/DL (ref 31.5–35.7)
MCV RBC AUTO: 84 FL (ref 79–97)
MONOCYTES # BLD AUTO: 0.5 X10E3/UL (ref 0.1–0.9)
MONOCYTES NFR BLD AUTO: 9 %
NEUTROPHILS # BLD AUTO: 4.5 X10E3/UL (ref 1.4–7)
NEUTROPHILS NFR BLD AUTO: 76 %
PLATELET # BLD AUTO: 215 X10E3/UL (ref 150–450)
RBC # BLD AUTO: 5.1 X10E6/UL (ref 4.14–5.8)
WBC # BLD AUTO: 5.9 X10E3/UL (ref 3.4–10.8)

## 2024-03-11 PROCEDURE — 3079F DIAST BP 80-89 MM HG: CPT | Performed by: INTERNAL MEDICINE

## 2024-03-11 PROCEDURE — 1123F ACP DISCUSS/DSCN MKR DOCD: CPT | Performed by: INTERNAL MEDICINE

## 2024-03-11 PROCEDURE — 99214 OFFICE O/P EST MOD 30 MIN: CPT | Performed by: INTERNAL MEDICINE

## 2024-03-11 PROCEDURE — 3077F SYST BP >= 140 MM HG: CPT | Performed by: INTERNAL MEDICINE

## 2024-03-11 RX ORDER — LISINOPRIL 5 MG/1
5 TABLET ORAL DAILY
Qty: 90 TABLET | Refills: 1 | Status: SHIPPED | OUTPATIENT
Start: 2024-03-11

## 2024-03-11 RX ORDER — TADALAFIL 10 MG/1
10 TABLET ORAL PRN
Qty: 12 TABLET | Refills: 2 | Status: SHIPPED | OUTPATIENT
Start: 2024-03-11

## 2024-03-11 RX ORDER — LISINOPRIL 2.5 MG/1
2.5 TABLET ORAL DAILY
Qty: 90 TABLET | Refills: 1 | Status: SHIPPED | OUTPATIENT
Start: 2024-03-11 | End: 2024-03-11

## 2024-03-11 RX ORDER — GLIMEPIRIDE 2 MG/1
2 TABLET ORAL 2 TIMES DAILY
Qty: 180 TABLET | Refills: 0 | Status: SHIPPED | OUTPATIENT
Start: 2024-03-11

## 2024-03-11 RX ORDER — MELOXICAM 7.5 MG/1
7.5 TABLET ORAL DAILY
Qty: 30 TABLET | Refills: 5 | Status: SHIPPED | OUTPATIENT
Start: 2024-03-11

## 2024-03-11 ASSESSMENT — PATIENT HEALTH QUESTIONNAIRE - PHQ9
SUM OF ALL RESPONSES TO PHQ QUESTIONS 1-9: 0
SUM OF ALL RESPONSES TO PHQ QUESTIONS 1-9: 0
SUM OF ALL RESPONSES TO PHQ9 QUESTIONS 1 & 2: 0
1. LITTLE INTEREST OR PLEASURE IN DOING THINGS: 0
SUM OF ALL RESPONSES TO PHQ QUESTIONS 1-9: 0
2. FEELING DOWN, DEPRESSED OR HOPELESS: 0
SUM OF ALL RESPONSES TO PHQ QUESTIONS 1-9: 0

## 2024-03-11 ASSESSMENT — ENCOUNTER SYMPTOMS
RESPIRATORY NEGATIVE: 1
GASTROINTESTINAL NEGATIVE: 1
EYES NEGATIVE: 1

## 2024-03-11 NOTE — PROGRESS NOTES
Nursing staff confirmed patient with full name and . Prepared patient for visit today by obtaining vitals, verifying medication list and allergies, and briefly discussing reason for visit.       Chief Complaint   Patient presents with    Diabetes    Eczema    Nicotine Dependence    Knee Pain       1. \"Have you been to the ER, urgent care clinic since your last visit?  Hospitalized since your last visit?\" no    2. \"Have you seen or consulted any other health care providers outside of the [facility] since your last visit?\" no    3. For patients aged 45-75: Has the patient had a colonoscopy / FIT/ Cologuard? yes      
risks/benefits/costs/interactions/alternatives discussed with patient.   He was given an after visit summary which includes diagnoses, current medications, & vitals.   He expressed understanding with the diagnosis and plan.

## 2024-03-12 LAB
25(OH)D3+25(OH)D2 SERPL-MCNC: 12.7 NG/ML (ref 30–100)
ALBUMIN SERPL-MCNC: 4.5 G/DL (ref 3.9–4.9)
ALBUMIN/GLOB SERPL: 1.4 {RATIO} (ref 1.2–2.2)
ALP SERPL-CCNC: 73 IU/L (ref 44–121)
ALT SERPL-CCNC: 19 IU/L (ref 0–44)
AST SERPL-CCNC: 20 IU/L (ref 0–40)
BILIRUB SERPL-MCNC: <0.2 MG/DL (ref 0–1.2)
BUN SERPL-MCNC: 11 MG/DL (ref 8–27)
BUN/CREAT SERPL: 12 (ref 10–24)
CALCIUM SERPL-MCNC: 9.9 MG/DL (ref 8.6–10.2)
CHLORIDE SERPL-SCNC: 103 MMOL/L (ref 96–106)
CHOLEST SERPL-MCNC: 183 MG/DL (ref 100–199)
CO2 SERPL-SCNC: 22 MMOL/L (ref 20–29)
CREAT SERPL-MCNC: 0.95 MG/DL (ref 0.76–1.27)
EGFRCR SERPLBLD CKD-EPI 2021: 89 ML/MIN/1.73
GLOBULIN SER CALC-MCNC: 3.2 G/DL (ref 1.5–4.5)
GLUCOSE SERPL-MCNC: 115 MG/DL (ref 70–99)
HBA1C MFR BLD: 7 % (ref 4.8–5.6)
HDLC SERPL-MCNC: 60 MG/DL
IMP & REVIEW OF LAB RESULTS: NORMAL
LDLC SERPL CALC-MCNC: 96 MG/DL (ref 0–99)
Lab: NORMAL
POTASSIUM SERPL-SCNC: 4.7 MMOL/L (ref 3.5–5.2)
PROT SERPL-MCNC: 7.7 G/DL (ref 6–8.5)
PSA SERPL-MCNC: 1.8 NG/ML (ref 0–4)
SODIUM SERPL-SCNC: 142 MMOL/L (ref 134–144)
TRIGL SERPL-MCNC: 155 MG/DL (ref 0–149)
VLDLC SERPL CALC-MCNC: 27 MG/DL (ref 5–40)

## 2024-03-13 LAB
TESTOST FREE SERPL-MCNC: 4.8 PG/ML (ref 6.6–18.1)
TESTOST SERPL-MCNC: 338 NG/DL (ref 264–916)

## 2024-03-14 DIAGNOSIS — E55.9 VITAMIN D DEFICIENCY: ICD-10-CM

## 2024-03-14 DIAGNOSIS — R79.89 LOW TESTOSTERONE IN MALE: Primary | ICD-10-CM

## 2024-03-14 RX ORDER — ERGOCALCIFEROL 1.25 MG/1
50000 CAPSULE ORAL WEEKLY
Qty: 16 CAPSULE | Refills: 0 | Status: SHIPPED | OUTPATIENT
Start: 2024-03-14

## 2024-03-14 NOTE — TELEPHONE ENCOUNTER
----- Message from Annelise Abraham MD sent at 3/14/2024  8:13 AM EDT -----  vit D level very low.will start on vit D 50,000 unit 1 cap weekly for 4 months.will repeat level in 4 months.adv to be on milk product and expose to sun for 20 min a day.  Please call in vitamin D.  Diabetes slightly worse.  Advised to be on 1800-calorie ADA diet and exercise.  Low testosterone, please refer patient to urologist for testosterone shot.

## 2024-03-19 ENCOUNTER — TELEPHONE (OUTPATIENT)
Age: 64
End: 2024-03-19

## 2024-03-19 DIAGNOSIS — N52.9 ERECTILE DYSFUNCTION, UNSPECIFIED ERECTILE DYSFUNCTION TYPE: Primary | ICD-10-CM

## 2024-03-19 DIAGNOSIS — R79.89 LOW TESTOSTERONE IN MALE: ICD-10-CM

## 2024-03-19 NOTE — TELEPHONE ENCOUNTER
Annelise Abraham MD  3/14/2024  8:13 AM EDT Back to Top      vit D level very low.will start on vit D 50,000 unit 1 cap weekly for 4 months.will repeat level in 4 months.adv to be on milk product and expose to sun for 20 min a day.  Please call in vitamin D.  Diabetes slightly worse.  Advised to be on 1800-calorie ADA diet and exercise.  Low testosterone, please refer patient to urologist for testosterone shot.

## 2024-04-03 NOTE — ED NOTES
Bedside and Verbal shift change report given to Sharlene RN (oncoming nurse) by Maria Fernanda Salcedo RN (offgoing nurse). Report included the following information SBAR, Kardex, ED Summary, Procedure Summary, Intake/Output, MAR and Recent Results.
Discharge instructions were given to the patient by Sung Cody RN. The patient left the Emergency Department ambulatory, alert and oriented and in no acute distress with 2 prescriptions. The patient was encouraged to call or return to the ED for worsening issues or problems and was encouraged to schedule a follow up appointment for continuing care. The patient verbalized understanding of discharge instructions and prescriptions, all questions were answered. The patient has no further concerns at this time.
Emergency Department Nursing Plan of Care The Nursing Plan of Care is developed from the Nursing assessment and Emergency Department Attending provider initial evaluation. The plan of care may be reviewed in the ED Provider note. The Plan of Care was developed with the following considerations:  
Patient / Family readiness to learn indicated by:verbalized understanding Persons(s) to be included in education: patient Barriers to Learning/Limitations:No 
 
Signed Anjelica Velasquez RN   
10/16/2018   6:44 PM 
 
 
 
 
 

2

## 2024-07-14 DIAGNOSIS — E55.9 VITAMIN D DEFICIENCY: ICD-10-CM

## 2024-12-17 ENCOUNTER — TELEPHONE (OUTPATIENT)
Age: 64
End: 2024-12-17

## 2024-12-17 DIAGNOSIS — I10 ESSENTIAL (PRIMARY) HYPERTENSION: ICD-10-CM

## 2024-12-17 RX ORDER — LISINOPRIL 5 MG/1
5 TABLET ORAL DAILY
Qty: 90 TABLET | Refills: 0 | OUTPATIENT
Start: 2024-12-17

## 2024-12-30 DIAGNOSIS — I10 ESSENTIAL (PRIMARY) HYPERTENSION: ICD-10-CM

## 2024-12-30 DIAGNOSIS — E11.21 TYPE 2 DIABETES WITH NEPHROPATHY (HCC): ICD-10-CM

## 2024-12-31 RX ORDER — LISINOPRIL 5 MG/1
5 TABLET ORAL DAILY
Qty: 30 TABLET | Refills: 0 | Status: SHIPPED | OUTPATIENT
Start: 2024-12-31

## 2025-02-05 ENCOUNTER — TELEPHONE (OUTPATIENT)
Age: 65
End: 2025-02-05

## 2025-02-05 DIAGNOSIS — E11.21 TYPE 2 DIABETES WITH NEPHROPATHY (HCC): ICD-10-CM

## 2025-02-05 DIAGNOSIS — I10 ESSENTIAL (PRIMARY) HYPERTENSION: ICD-10-CM

## 2025-02-07 RX ORDER — SITAGLIPTIN 50 MG/1
50 TABLET, FILM COATED ORAL DAILY
Qty: 30 TABLET | Refills: 0 | Status: SHIPPED | OUTPATIENT
Start: 2025-02-07

## 2025-02-07 RX ORDER — GLIMEPIRIDE 2 MG/1
2 TABLET ORAL 2 TIMES DAILY
Qty: 10 TABLET | Refills: 0 | Status: SHIPPED | OUTPATIENT
Start: 2025-02-07

## 2025-02-07 RX ORDER — LISINOPRIL 5 MG/1
5 TABLET ORAL DAILY
Qty: 30 TABLET | Refills: 0 | Status: SHIPPED | OUTPATIENT
Start: 2025-02-07

## 2025-02-21 ENCOUNTER — TELEMEDICINE (OUTPATIENT)
Age: 65
End: 2025-02-21

## 2025-02-21 DIAGNOSIS — E11.21 TYPE 2 DIABETES WITH NEPHROPATHY (HCC): Primary | ICD-10-CM

## 2025-02-21 DIAGNOSIS — T40.2X4D OPIOID OVERDOSE, UNDETERMINED INTENT, SUBSEQUENT ENCOUNTER: ICD-10-CM

## 2025-02-21 DIAGNOSIS — E55.9 VITAMIN D DEFICIENCY: ICD-10-CM

## 2025-02-21 DIAGNOSIS — I10 ESSENTIAL (PRIMARY) HYPERTENSION: ICD-10-CM

## 2025-02-21 DIAGNOSIS — M17.0 PRIMARY OSTEOARTHRITIS OF BOTH KNEES: ICD-10-CM

## 2025-02-21 DIAGNOSIS — Z12.5 SCREENING FOR PROSTATE CANCER: ICD-10-CM

## 2025-02-21 RX ORDER — GLIMEPIRIDE 2 MG/1
2 TABLET ORAL 2 TIMES DAILY
Qty: 180 TABLET | Refills: 0 | Status: SHIPPED | OUTPATIENT
Start: 2025-02-21

## 2025-02-21 RX ORDER — BUPRENORPHINE AND NALOXONE 8; 2 MG/1; MG/1
FILM, SOLUBLE BUCCAL; SUBLINGUAL
COMMUNITY
Start: 2025-02-11

## 2025-02-21 RX ORDER — LISINOPRIL 5 MG/1
5 TABLET ORAL DAILY
Qty: 90 TABLET | Refills: 0 | Status: SHIPPED | OUTPATIENT
Start: 2025-02-21

## 2025-02-21 ASSESSMENT — PATIENT HEALTH QUESTIONNAIRE - PHQ9
2. FEELING DOWN, DEPRESSED OR HOPELESS: NOT AT ALL
SUM OF ALL RESPONSES TO PHQ QUESTIONS 1-9: 0
1. LITTLE INTEREST OR PLEASURE IN DOING THINGS: NOT AT ALL
SUM OF ALL RESPONSES TO PHQ QUESTIONS 1-9: 0
SUM OF ALL RESPONSES TO PHQ9 QUESTIONS 1 & 2: 0

## 2025-02-21 ASSESSMENT — ENCOUNTER SYMPTOMS
EYES NEGATIVE: 1
RESPIRATORY NEGATIVE: 1
GASTROINTESTINAL NEGATIVE: 1

## 2025-02-21 NOTE — PROGRESS NOTES
Chief Complaint   Patient presents with    Fatigue    Drug Overdose      \"Have you been to the ER, urgent care clinic since your last visit?  Hospitalized since your last visit?\"    YES - When: approximately 8 days ago.  Where and Why: Overdose Opioid 02/10/25.    “Have you seen or consulted any other health care providers outside our system since your last visit?”    NO      “Have you had a diabetic eye exam?”    NO     No diabetic eye exam on file

## 2025-02-21 NOTE — ASSESSMENT & PLAN NOTE
Has not been seen for long time.  His diabetes, not monitoring blood sugar regularly.  Advised him to continue on medications and do lab work and follow-up with me in a month.  Will give refill of all medication.  Orders:    SITagliptin (JANUVIA) 50 MG tablet; Take 1 tablet by mouth daily    glimepiride (AMARYL) 2 MG tablet; Take 1 tablet by mouth 2 times daily    Comprehensive Metabolic Panel    Lipid Panel    Hemoglobin A1C    Albumin/Creatinine Ratio, Urine

## 2025-02-21 NOTE — PROGRESS NOTES
Juliocesar Dorantes, was evaluated through a synchronous (real-time) audio-video encounter. The patient (or guardian if applicable) is aware that this is a billable service, which includes applicable co-pays. This Virtual Visit was conducted with patient's (and/or legal guardian's) consent. Patient identification was verified, and a caregiver was present when appropriate.   The patient was located at Home: [address]  [facility] [address]  Provider was located at Facility (Appt Dept): [address]  [address]  [place] [address]  Confirm you are appropriately licensed, registered, or certified to deliver care in the state where the patient is located as indicated above. If you are not or unsure, please re-schedule the visit: Yes, I confirm.     Juliocesar Dorantes (:  1960) is a Established patient, presenting virtually for evaluation of the following:      Below is the assessment and plan developed based on review of pertinent history, physical exam, labs, studies, and medications.     Assessment & Plan  Opioid overdose, undetermined intent, subsequent encounter     .  Patient was seen in [facility] emergency room and was prescribed Suboxone.  He is running out of Suboxone and would like to have another refill.  I have explained to him that I do not have any other ECGs to prescribe Suboxone, he must contact [facility] in [facility].  I have given information of [facility].       Type 2 diabetes with nephropathy (HCC)     Has not been seen for long time.  His diabetes, not monitoring blood sugar regularly.  Advised him to continue on medications and do lab work and follow-up with me in a month.  Will give refill of all medication.  Orders:    SITagliptin (JANUVIA) 50 MG tablet; Take 1 tablet by mouth daily    glimepiride (AMARYL) 2 MG tablet; Take 1 tablet by mouth 2 times daily    Comprehensive Metabolic Panel    Lipid Panel    Hemoglobin A1C    Albumin/Creatinine Ratio, Urine    Essential (primary)

## 2025-06-09 DIAGNOSIS — E11.21 TYPE 2 DIABETES WITH NEPHROPATHY (HCC): ICD-10-CM

## 2025-06-09 DIAGNOSIS — M17.0 PRIMARY OSTEOARTHRITIS OF BOTH KNEES: ICD-10-CM

## 2025-06-09 DIAGNOSIS — I10 ESSENTIAL (PRIMARY) HYPERTENSION: ICD-10-CM

## 2025-06-09 DIAGNOSIS — E55.9 VITAMIN D DEFICIENCY: ICD-10-CM

## 2025-06-09 NOTE — TELEPHONE ENCOUNTER
Last appt 2/21/2025      Next Apt:     Future Appointments   Date Time Provider Department Center   6/18/2025  9:00 AM Marva Smith APRN - CNP [facility] DEP         [facility] [address] [place] [address] - [address] - P [phone] - F [phone]  [address] [facility] [address]  Phone: [phone] Fax: [phone]

## 2025-06-09 NOTE — TELEPHONE ENCOUNTER
Lov:04/21/2025  Nov:06/18/2025    [facility] [address] [place] [address] [address]     vitamin D (ERGOCALCIFEROL) 1.25 MG (45571 UT) CAPS capsule     meloxicam (MOBIC) 7.5 MG tablet       folic acid (FOLVITE) 1 MG tablet       diclofenac sodium (VOLTAREN) 1 % GEL       thiamine 100 MG tablet       acetaminophen (TYLENOL) 325 MG tablet

## 2025-06-10 RX ORDER — FOLIC ACID 1 MG/1
1 TABLET ORAL DAILY
Qty: 90 TABLET | Refills: 0 | Status: CANCELLED | OUTPATIENT
Start: 2025-06-10

## 2025-06-10 RX ORDER — MELOXICAM 7.5 MG/1
7.5 TABLET ORAL DAILY
Qty: 30 TABLET | Refills: 0 | Status: SHIPPED | OUTPATIENT
Start: 2025-06-10

## 2025-06-10 RX ORDER — ACETAMINOPHEN 325 MG/1
650 TABLET ORAL 3 TIMES DAILY PRN
Qty: 120 TABLET | Refills: 1 | Status: CANCELLED | OUTPATIENT
Start: 2025-06-10

## 2025-06-10 RX ORDER — SITAGLIPTIN 50 MG/1
50 TABLET, FILM COATED ORAL DAILY
Qty: 90 TABLET | Refills: 0 | Status: SHIPPED | OUTPATIENT
Start: 2025-06-10

## 2025-06-10 RX ORDER — LANOLIN ALCOHOL/MO/W.PET/CERES
100 CREAM (GRAM) TOPICAL DAILY
Qty: 90 TABLET | Refills: 1 | Status: CANCELLED | OUTPATIENT
Start: 2025-06-10

## 2025-06-10 RX ORDER — ERGOCALCIFEROL 1.25 MG/1
50000 CAPSULE, LIQUID FILLED ORAL WEEKLY
Qty: 16 CAPSULE | Refills: 0 | Status: SHIPPED | OUTPATIENT
Start: 2025-06-10

## 2025-06-10 RX ORDER — LISINOPRIL 5 MG/1
5 TABLET ORAL DAILY
Qty: 90 TABLET | Refills: 0 | Status: SHIPPED | OUTPATIENT
Start: 2025-06-10

## 2025-06-10 RX ORDER — GLIMEPIRIDE 2 MG/1
2 TABLET ORAL 2 TIMES DAILY
Qty: 180 TABLET | Refills: 0 | Status: SHIPPED | OUTPATIENT
Start: 2025-06-10

## (undated) DEVICE — (D)AGENT INJECTN ELEVIEW 10ML -- DISC BY MFG

## (undated) DEVICE — Device: Brand: DISPOSABLE ELECTROSURGICAL SNARE

## (undated) DEVICE — TRAP SURG QUAD PARABOLA SLOT DSGN SFTY SCRN TRAPEASE

## (undated) DEVICE — Device: Brand: SINGLE USE INJECTOR NM600/610

## (undated) DEVICE — REM POLYHESIVE ADULT PATIENT RETURN ELECTRODE: Brand: VALLEYLAB